# Patient Record
Sex: MALE | Race: WHITE | NOT HISPANIC OR LATINO | Employment: FULL TIME | URBAN - METROPOLITAN AREA
[De-identification: names, ages, dates, MRNs, and addresses within clinical notes are randomized per-mention and may not be internally consistent; named-entity substitution may affect disease eponyms.]

---

## 2023-04-14 ENCOUNTER — TELEPHONE (OUTPATIENT)
Dept: PRIMARY CARE | Facility: CLINIC | Age: 48
End: 2023-04-14

## 2023-04-14 NOTE — TELEPHONE ENCOUNTER
Pt. States his employer is giving him a difficult time in regards to why he takes percocet.    He is requesting a letter stating why he takes it.

## 2023-04-14 NOTE — LETTER
April 14, 2023         Patient: Bereket Schaefer   YOB: 1975   Date of Visit: 4/14/2023       To Whom It May Concern,    Mr. Schaefer takes percocet to treat and manage his chronic back pain. Please contact my office with any further questions.       Sincerely,         Gurpreet Cruz MD      CC: No Recipients  ______________________________________________________________________________________

## 2023-05-29 NOTE — PROGRESS NOTES
Subjective   Patient ID: Bereket Schaefer is a 47 y.o. male who presents for 3 month recheck on chronic back pain and HTN.    OARRS:  Gurpreet Cruz MD on 5/31/2023  4:59 PM  I have personally reviewed the OARRS report for Bereket Schaefer. I have considered the risks of abuse, dependence, addiction and diversion    Is the patient prescribed a combination of a benzodiazepine and opioid?  No    Last Urine Drug Screen / ordered today: Yes  No results found for this or any previous visit (from the past 76131 hour(s)).  Results are as expected.     Controlled Substance Agreement:  Date of the Last Agreement: 5/31/23  Reviewed Controlled Substance Agreement including but not limited to the benefits, risks, and alternatives to treatment with a Controlled Substance medication(s).    Opioids:  What is the patient's goal of therapy? Pain relief   Is this being achieved with current treatment? yes    I have calculated the patient's Morphine Dose Equivalent (MED):   I have considered referral to Pain Management and/or a specialist, and do not feel it is necessary at this time.    I feel that it is clinically indicated to continue this current medication regimen after consideration of alternative therapies, and other non-opioid treatment.    Opioid Risk Screening:  Family History of Substance Abuse  Alcohol: 0 (5/31/2023  5:00 PM)  Illegal Drugs: 0 (5/31/2023  5:00 PM)  Prescription Drugs: 0 (5/31/2023  5:00 PM)    Personal History of Substance Abuse  Alcohol: 0 (5/31/2023  5:00 PM)  Illegal Drugs: 0 (5/31/2023  5:00 PM)  Prescription Drugs: 0 (5/31/2023  5:00 PM)    Patient Age (16-45)  Age (16-45): 0 (5/31/2023  5:00 PM)    History of Preadolescent Sexual Abuse  History of Preadolescent Sexual Abuse: .0 (5/31/2023  5:00 PM)    Psychological Disease  Attention Deficit Disorder, Obsessive Compulsive Disorder, Bipolar, Schizophrenia: 0 (5/31/2023  5:00 PM)  Depression: 0 (5/31/2023  5:00 PM)    Total Score  Total Score: 0 (5/31/2023   5:00 PM)    Total Score Risk Category  TOTAL SCORE CATEGORY: Low Risk (0-3) (5/31/2023  5:00 PM)        Pain Assessment:  Analgesia  What was your pain level on average during the past week?: 7  What was your pain level at its worst during the past week?: 10 - Pain as bad as it can be  What percentage of your pain has been relieved during the past week?: 50 %  Is the amount of pain relief you are now obtaining from your current pain relievers enough to make a real difference in your life?: Y  Query to Clinician: Is the patient's pain relief clinically significant?: Yes    Activities of Daily Living  Physical Functioning: Better  Family Relationships: Same  Social Relationships: Same  Mood: Same  Sleep Patterns: Same  Overall Functioning: Better    Adverse Events  Is patient experiencing any side effects from current pain relievers?: N  Patient's Overall Severity of Side Effects: None      Assessment  Is your overall impression that this patient is benefiting from opioid therapy?: Yes  Specific Analgesic Plan: Continue present regimen      Objective   Visit Vitals  /81 (BP Location: Left arm, Patient Position: Sitting, BP Cuff Size: Large adult)   Pulse 63   Temp 37 °C (98.6 °F) (Temporal)   Resp 16   SpO2 94%   Smoking Status Every Day      O: VSS AFEB Awake, Alert, NAD.  No intoxication, withdrawal, or sedation.  Chest CTA.  Heart RRR.  Ext no c/c/e.   Lumbar ttp.  + B/l slr.    Lab Results   Component Value Date    WBC 7.0 02/23/2022    HGB 15.7 02/23/2022    HCT 47.3 02/23/2022    MCV 93 02/23/2022     02/23/2022       Chemistry    Lab Results   Component Value Date/Time     02/23/2022 1652    K 4.1 02/23/2022 1652     02/23/2022 1652    CO2 30 02/23/2022 1652    BUN 9 02/23/2022 1652    CREATININE 0.83 02/23/2022 1652    Lab Results   Component Value Date/Time    CALCIUM 9.4 02/23/2022 1652    ALKPHOS 87 02/23/2022 1652    AST 17 02/23/2022 1652    ALT 20 02/23/2022 1652    BILITOT 0.7  02/23/2022 1652          Lab Results   Component Value Date    CHOL 229 (H) 11/08/2018     Lab Results   Component Value Date    HDL 27.8 (A) 11/08/2018     No results found for: LDLCALC  Lab Results   Component Value Date    TRIG 184 (H) 11/08/2018     No components found for: CHOLHDL   No results found for: HGBA1C    Assessment/Plan   Problem List Items Addressed This Visit    None       # Right sciatica -- prednisone burst and taper did not help much 2/2022. Continues to get right leg spasms with any extended walking. Normal pulses. C/W neuroclaudication.     # Atypical Moles Mid back -- mulltiple erythematous seb k with irregular pigmentation, and 1 irregular pigmented macule. Diffuse benign appearing seb ks. Refer to [facility] derm.    # GERD vs dysphagia -- recommend trial of famotidine/pepcid 20 mg daily, also encouraged to drink flat (non -carbonated) liquids with meals.     # High Cholesterol-- 13.3% cardiac risk. counseled on smoking cessation -- atorvastatin 20 mg daily.     # Left foot plantar wart -- counseled on using 40% salicylic acid paste to apply every night after paring down dead skin.     # Hypertension -- much improved control -- continue amlodipine to 5 mg daily and lisinopril 10 mg daily -- doing well since switched jobs. Lately using more hydralazine 10 mg tid prn SBP >130. Renal us/bloodwork ok. 24 hour metanephrines normal. Hold off on b-blockers due to pulse in the 60s. EKG was ok. encourage smoking cessation, sodium restriction.     # hx Migraine Headache -- resolved since bp under control. rare use of cyclobenzaprine and regular stretching exercises. Has never tried triptans.    # hx Erectile dysfunction -- improved with Depo-T. no longer takeing.     # Fatigue -- low testosterone -- at 100 summer 2014. Could not start Androgel 2 pumps a day due to cost. Hold off on testosterone injections until bp under control. Now that BP under control --using depo-T 200 mcg monthly helped at first but  now forgetting to have injections done. Check labs now that he has insurance.     # Knee pain -- B/L OA +/- meniscal injury -- continue to treat with ice, rest, can try naproxen instead of ibprofen, knee brace. Counseled on knee strengthening HEP on 3/13/2020. If no better can offer CSI or refer to ortho. puts up with pain.     # hx Right hand paresthesias -- Tinel's over right ulnar canal -- recommended wrist splint.     # hx Episodic Esophageal spasm/odynophagia -- cigarette smoker and heavy coke drinker. no better with trial of otc pepcid. has req to check esophagram and send to Dr Romo for EGD since no better.     # hx Atypical chest pain -- stress test and echo normal 11/2018.    # Angular cheilitis -- improved with topical treatment.     Using ibuprofen, topical creams to get through the day, just sitting on couch when gets home. Left knee swells, wearing knee brace. Uses cyclobenzaprine on weekends but too sedating to use during work week. no better with lidocaine patches.     Stable chronic htn, ed, low t, & chronic back pain, fatigue and ed. Not tolerating gabapentin due to fatigue and weaned off. Weaned off hydrocodone since it wasn't helping, had been using Kratom otc. 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     Meloxicam and celebrex no help. No upset stomach.     has not had testosterone injection in a few months.     Used to drink energy drinks but has not had any since 12/18/15.    3/8/16 ENDO ([facility]) in [place]. No imaging recommended. Reordered 24 hour cortisol collection--> NO ADRENAL CAUSE of HYPERTENSION. MP  2/13/17 PAIN -- Ko -- chronic pain syndrome, failed back surgical syndrome, check XR, MRI. cut back on regular pop (12 cans cocacola daily).   4/18/2017, 3/28/17 B/L L3/4/5 medial nerve blocks & epidural injection ineffective -- made things worse x 2 weeks.  11/7-11/8/18 [facility] -- atypical chest pain -- trop neg. Nuclear Stress Normal, ef 54%, cont ACEi and  CCB, allergic to ASA, encouraged smoking cessation. CXR infiltrate vs atelectasis.

## 2023-05-31 ENCOUNTER — OFFICE VISIT (OUTPATIENT)
Dept: PRIMARY CARE | Facility: CLINIC | Age: 48
End: 2023-05-31

## 2023-05-31 VITALS
DIASTOLIC BLOOD PRESSURE: 81 MMHG | OXYGEN SATURATION: 94 % | HEART RATE: 63 BPM | SYSTOLIC BLOOD PRESSURE: 138 MMHG | TEMPERATURE: 98.6 F | RESPIRATION RATE: 16 BRPM

## 2023-05-31 DIAGNOSIS — M54.41 CHRONIC MIDLINE LOW BACK PAIN WITH BILATERAL SCIATICA: ICD-10-CM

## 2023-05-31 DIAGNOSIS — K13.0 ANGULAR CHEILITIS DUE TO BACTERIAL INFECTION: ICD-10-CM

## 2023-05-31 DIAGNOSIS — A49.9 ANGULAR CHEILITIS DUE TO BACTERIAL INFECTION: ICD-10-CM

## 2023-05-31 DIAGNOSIS — F41.9 ANXIETY: ICD-10-CM

## 2023-05-31 DIAGNOSIS — G89.29 CHRONIC MIDLINE LOW BACK PAIN WITH BILATERAL SCIATICA: ICD-10-CM

## 2023-05-31 DIAGNOSIS — M54.42 CHRONIC MIDLINE LOW BACK PAIN WITH BILATERAL SCIATICA: ICD-10-CM

## 2023-05-31 DIAGNOSIS — N52.9 MALE ERECTILE DISORDER OF ORGANIC ORIGIN: ICD-10-CM

## 2023-05-31 DIAGNOSIS — I10 PRIMARY HYPERTENSION: Primary | ICD-10-CM

## 2023-05-31 PROBLEM — M94.0 COSTOCHONDRITIS: Status: ACTIVE | Noted: 2023-05-31

## 2023-05-31 PROBLEM — R79.89 HIGH SERUM CHOLESTEROL SULFATE: Status: ACTIVE | Noted: 2023-05-31

## 2023-05-31 PROBLEM — N40.1 BPH WITH OBSTRUCTION/LOWER URINARY TRACT SYMPTOMS: Status: ACTIVE | Noted: 2023-05-31

## 2023-05-31 PROBLEM — R53.83 FATIGUE: Status: ACTIVE | Noted: 2023-05-31

## 2023-05-31 PROBLEM — R40.0 DAYTIME SOMNOLENCE: Status: ACTIVE | Noted: 2023-05-31

## 2023-05-31 PROBLEM — F17.210 NICOTINE DEPENDENCE, CIGARETTES, UNCOMPLICATED: Status: ACTIVE | Noted: 2023-05-31

## 2023-05-31 PROBLEM — M25.569 KNEE PAIN: Status: ACTIVE | Noted: 2023-05-31

## 2023-05-31 PROBLEM — N13.8 BPH WITH OBSTRUCTION/LOWER URINARY TRACT SYMPTOMS: Status: ACTIVE | Noted: 2023-05-31

## 2023-05-31 PROBLEM — E23.0 HYPOGONADOTROPIC HYPOGONADISM (MULTI): Status: ACTIVE | Noted: 2023-05-31

## 2023-05-31 PROBLEM — M54.9 BACK PAIN: Status: ACTIVE | Noted: 2023-05-31

## 2023-05-31 PROBLEM — E87.6 HYPOKALEMIA: Status: ACTIVE | Noted: 2023-05-31

## 2023-05-31 PROBLEM — G56.21 IMPINGEMENT OF RIGHT ULNAR NERVE: Status: ACTIVE | Noted: 2023-05-31

## 2023-05-31 PROCEDURE — 99212 OFFICE O/P EST SF 10 MIN: CPT | Performed by: FAMILY MEDICINE

## 2023-05-31 PROCEDURE — 3079F DIAST BP 80-89 MM HG: CPT | Performed by: FAMILY MEDICINE

## 2023-05-31 PROCEDURE — 4004F PT TOBACCO SCREEN RCVD TLK: CPT | Performed by: FAMILY MEDICINE

## 2023-05-31 PROCEDURE — 3075F SYST BP GE 130 - 139MM HG: CPT | Performed by: FAMILY MEDICINE

## 2023-05-31 RX ORDER — ESCITALOPRAM OXALATE 10 MG/1
10 TABLET ORAL DAILY
Qty: 90 TABLET | Refills: 3 | Status: SHIPPED | OUTPATIENT
Start: 2023-05-31 | End: 2024-01-30

## 2023-05-31 RX ORDER — MELOXICAM 15 MG/1
15 TABLET ORAL DAILY
COMMUNITY
End: 2024-01-30

## 2023-05-31 RX ORDER — LISINOPRIL 10 MG/1
10 TABLET ORAL DAILY
Qty: 90 TABLET | Refills: 3 | Status: SHIPPED | OUTPATIENT
Start: 2023-05-31 | End: 2024-01-30 | Stop reason: SDUPTHER

## 2023-05-31 RX ORDER — HYDROCODONE BITARTRATE AND ACETAMINOPHEN 5; 325 MG/1; MG/1
1 TABLET ORAL EVERY 6 HOURS PRN
COMMUNITY
End: 2023-05-31 | Stop reason: ALTCHOICE

## 2023-05-31 RX ORDER — HYDROCODONE BITARTRATE AND ACETAMINOPHEN 10; 325 MG/1; MG/1
1 TABLET ORAL EVERY 4 HOURS PRN
Qty: 180 TABLET | Refills: 0 | Status: SHIPPED | OUTPATIENT
Start: 2023-08-02 | End: 2023-08-30 | Stop reason: SDUPTHER

## 2023-05-31 RX ORDER — OXYCODONE AND ACETAMINOPHEN 7.5; 325 MG/1; MG/1
1 TABLET ORAL 3 TIMES DAILY
Qty: 180 TABLET | Refills: 0 | Status: CANCELLED | OUTPATIENT
Start: 2023-05-31

## 2023-05-31 RX ORDER — HYDRALAZINE HYDROCHLORIDE 10 MG/1
10 TABLET, FILM COATED ORAL 2 TIMES DAILY
Qty: 180 TABLET | Refills: 1 | Status: SHIPPED | OUTPATIENT
Start: 2023-05-31

## 2023-05-31 RX ORDER — TADALAFIL 5 MG/1
5 TABLET ORAL DAILY
COMMUNITY
End: 2023-05-31 | Stop reason: SDUPTHER

## 2023-05-31 RX ORDER — ESCITALOPRAM OXALATE 10 MG/1
10 TABLET ORAL DAILY
COMMUNITY
End: 2023-05-31 | Stop reason: SDUPTHER

## 2023-05-31 RX ORDER — HYDRALAZINE HYDROCHLORIDE 10 MG/1
10 TABLET, FILM COATED ORAL 2 TIMES DAILY
COMMUNITY
Start: 2015-12-22 | End: 2023-05-31 | Stop reason: SDUPTHER

## 2023-05-31 RX ORDER — IBUPROFEN 600 MG/1
600 TABLET ORAL EVERY 8 HOURS PRN
COMMUNITY
Start: 2019-05-22 | End: 2023-05-31 | Stop reason: SDUPTHER

## 2023-05-31 RX ORDER — OXYCODONE AND ACETAMINOPHEN 7.5; 325 MG/1; MG/1
1 TABLET ORAL 3 TIMES DAILY
COMMUNITY
End: 2023-10-03 | Stop reason: ALTCHOICE

## 2023-05-31 RX ORDER — CYCLOBENZAPRINE HCL 10 MG
10 TABLET ORAL 2 TIMES DAILY PRN
Qty: 180 TABLET | Refills: 1 | Status: SHIPPED | OUTPATIENT
Start: 2023-05-31

## 2023-05-31 RX ORDER — CYCLOBENZAPRINE HCL 10 MG
10 TABLET ORAL 2 TIMES DAILY PRN
COMMUNITY
Start: 2014-05-01 | End: 2023-05-31 | Stop reason: SDUPTHER

## 2023-05-31 RX ORDER — FLUCONAZOLE 100 MG/1
100 TABLET ORAL DAILY
Qty: 7 TABLET | Refills: 2 | Status: SHIPPED | OUTPATIENT
Start: 2023-05-31 | End: 2023-12-21

## 2023-05-31 RX ORDER — ATORVASTATIN CALCIUM 20 MG/1
20 TABLET, FILM COATED ORAL DAILY
Qty: 90 TABLET | Refills: 3 | Status: SHIPPED | OUTPATIENT
Start: 2023-05-31 | End: 2024-01-30 | Stop reason: SDUPTHER

## 2023-05-31 RX ORDER — IBUPROFEN 600 MG/1
600 TABLET ORAL EVERY 8 HOURS PRN
Qty: 180 TABLET | Refills: 3 | Status: SHIPPED | OUTPATIENT
Start: 2023-05-31

## 2023-05-31 RX ORDER — HYDROCODONE BITARTRATE AND ACETAMINOPHEN 10; 325 MG/1; MG/1
1 TABLET ORAL EVERY 4 HOURS PRN
Qty: 180 TABLET | Refills: 0 | Status: SHIPPED | OUTPATIENT
Start: 2023-06-03 | End: 2023-07-03

## 2023-05-31 RX ORDER — TADALAFIL 5 MG/1
5 TABLET ORAL DAILY
Qty: 10 TABLET | Refills: 3 | Status: SHIPPED | OUTPATIENT
Start: 2023-05-31 | End: 2023-08-30 | Stop reason: SDUPTHER

## 2023-05-31 RX ORDER — NALOXONE HYDROCHLORIDE 4 MG/.1ML
4 SPRAY NASAL AS NEEDED
Qty: 2 EACH | Refills: 0 | Status: SHIPPED | OUTPATIENT
Start: 2023-05-31 | End: 2024-02-16 | Stop reason: ALTCHOICE

## 2023-05-31 RX ORDER — AMLODIPINE BESYLATE 10 MG/1
10 TABLET ORAL DAILY
Qty: 90 TABLET | Refills: 3 | Status: SHIPPED | OUTPATIENT
Start: 2023-05-31

## 2023-05-31 RX ORDER — HYDROCODONE BITARTRATE AND ACETAMINOPHEN 10; 325 MG/1; MG/1
1 TABLET ORAL EVERY 4 HOURS PRN
Qty: 180 TABLET | Refills: 0 | Status: SHIPPED | OUTPATIENT
Start: 2023-07-03 | End: 2023-08-02

## 2023-05-31 RX ORDER — LISINOPRIL 10 MG/1
10 TABLET ORAL DAILY
COMMUNITY
End: 2023-05-31 | Stop reason: SDUPTHER

## 2023-05-31 RX ORDER — ATORVASTATIN CALCIUM 20 MG/1
1 TABLET, FILM COATED ORAL DAILY
COMMUNITY
Start: 2023-02-08 | End: 2023-05-31 | Stop reason: SDUPTHER

## 2023-05-31 RX ORDER — AMLODIPINE BESYLATE 10 MG/1
10 TABLET ORAL
COMMUNITY
End: 2023-05-31 | Stop reason: SDUPTHER

## 2023-05-31 ASSESSMENT — LIFESTYLE VARIABLES: TOTAL SCORE: 0

## 2023-08-30 ENCOUNTER — OFFICE VISIT (OUTPATIENT)
Dept: PRIMARY CARE | Facility: CLINIC | Age: 48
End: 2023-08-30

## 2023-08-30 VITALS
WEIGHT: 184.6 LBS | OXYGEN SATURATION: 96 % | SYSTOLIC BLOOD PRESSURE: 130 MMHG | BODY MASS INDEX: 25.75 KG/M2 | DIASTOLIC BLOOD PRESSURE: 72 MMHG | HEART RATE: 57 BPM | RESPIRATION RATE: 16 BRPM | TEMPERATURE: 98 F

## 2023-08-30 DIAGNOSIS — N52.9 MALE ERECTILE DISORDER OF ORGANIC ORIGIN: ICD-10-CM

## 2023-08-30 DIAGNOSIS — G89.29 CHRONIC MIDLINE LOW BACK PAIN WITH RIGHT-SIDED SCIATICA: ICD-10-CM

## 2023-08-30 DIAGNOSIS — M54.41 CHRONIC MIDLINE LOW BACK PAIN WITH BILATERAL SCIATICA: ICD-10-CM

## 2023-08-30 DIAGNOSIS — G89.29 CHRONIC MIDLINE LOW BACK PAIN WITH BILATERAL SCIATICA: ICD-10-CM

## 2023-08-30 DIAGNOSIS — M54.42 CHRONIC MIDLINE LOW BACK PAIN WITH BILATERAL SCIATICA: ICD-10-CM

## 2023-08-30 DIAGNOSIS — I10 PRIMARY HYPERTENSION: Primary | ICD-10-CM

## 2023-08-30 DIAGNOSIS — M54.41 CHRONIC MIDLINE LOW BACK PAIN WITH RIGHT-SIDED SCIATICA: ICD-10-CM

## 2023-08-30 PROBLEM — R51.9 HEADACHE: Status: ACTIVE | Noted: 2023-08-30

## 2023-08-30 PROBLEM — K21.9 GERD (GASTROESOPHAGEAL REFLUX DISEASE): Status: ACTIVE | Noted: 2023-08-30

## 2023-08-30 PROCEDURE — 3075F SYST BP GE 130 - 139MM HG: CPT | Performed by: FAMILY MEDICINE

## 2023-08-30 PROCEDURE — 4004F PT TOBACCO SCREEN RCVD TLK: CPT | Performed by: FAMILY MEDICINE

## 2023-08-30 PROCEDURE — 99212 OFFICE O/P EST SF 10 MIN: CPT | Performed by: FAMILY MEDICINE

## 2023-08-30 PROCEDURE — 3078F DIAST BP <80 MM HG: CPT | Performed by: FAMILY MEDICINE

## 2023-08-30 RX ORDER — HYDROCODONE BITARTRATE AND ACETAMINOPHEN 10; 325 MG/1; MG/1
1 TABLET ORAL EVERY 4 HOURS PRN
Qty: 180 TABLET | Refills: 0 | Status: SHIPPED | OUTPATIENT
Start: 2023-09-01 | End: 2023-10-03 | Stop reason: ALTCHOICE

## 2023-08-30 RX ORDER — IBUPROFEN 200 MG
1 TABLET ORAL EVERY 24 HOURS
COMMUNITY
Start: 2022-11-23

## 2023-08-30 RX ORDER — NAPROXEN 500 MG/1
500 TABLET ORAL
COMMUNITY
Start: 2023-08-24 | End: 2023-11-27

## 2023-08-30 RX ORDER — HYDROCODONE BITARTRATE AND ACETAMINOPHEN 10; 325 MG/1; MG/1
1 TABLET ORAL EVERY 4 HOURS PRN
Qty: 180 TABLET | Refills: 0 | Status: SHIPPED | OUTPATIENT
Start: 2023-10-01 | End: 2023-10-31

## 2023-08-30 RX ORDER — TADALAFIL 5 MG/1
5 TABLET ORAL DAILY
Qty: 30 TABLET | Refills: 3 | Status: SHIPPED | OUTPATIENT
Start: 2023-08-30 | End: 2023-12-27

## 2023-08-30 RX ORDER — HYDROCODONE BITARTRATE AND ACETAMINOPHEN 10; 325 MG/1; MG/1
1 TABLET ORAL EVERY 4 HOURS PRN
Qty: 180 TABLET | Refills: 0 | Status: SHIPPED | OUTPATIENT
Start: 2023-10-31 | End: 2023-11-29 | Stop reason: SDUPTHER

## 2023-08-30 NOTE — ASSESSMENT & PLAN NOTE
Tox screen 6/19/2020 urine dip 6/11/2021, 5/25/2022, 8/24/2022, 5/31/23  OARRS reviewed  5/31/23, 8/30/23  Opioid agreement signed 5/31/23
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
## 2023-10-03 ENCOUNTER — TELEPHONE (OUTPATIENT)
Dept: PRIMARY CARE | Facility: CLINIC | Age: 48
End: 2023-10-03

## 2023-10-03 DIAGNOSIS — M54.41 CHRONIC MIDLINE LOW BACK PAIN WITH RIGHT-SIDED SCIATICA: ICD-10-CM

## 2023-10-03 DIAGNOSIS — G89.29 CHRONIC MIDLINE LOW BACK PAIN WITH RIGHT-SIDED SCIATICA: ICD-10-CM

## 2023-10-03 DIAGNOSIS — M54.41 CHRONIC MIDLINE LOW BACK PAIN WITH RIGHT-SIDED SCIATICA: Primary | ICD-10-CM

## 2023-10-03 DIAGNOSIS — G89.29 CHRONIC MIDLINE LOW BACK PAIN WITH RIGHT-SIDED SCIATICA: Primary | ICD-10-CM

## 2023-10-03 RX ORDER — OXYCODONE AND ACETAMINOPHEN 7.5; 325 MG/1; MG/1
1 TABLET ORAL EVERY 4 HOURS PRN
Qty: 150 TABLET | Refills: 0 | Status: SHIPPED | OUTPATIENT
Start: 2023-10-03 | End: 2023-11-02

## 2023-10-03 RX ORDER — OXYCODONE AND ACETAMINOPHEN 7.5; 325 MG/1; MG/1
1 TABLET ORAL EVERY 4 HOURS PRN
Qty: 150 TABLET | Refills: 0 | Status: SHIPPED | OUTPATIENT
Start: 2023-10-31 | End: 2023-11-01 | Stop reason: SDUPTHER

## 2023-10-03 NOTE — Clinical Note
Call Bereket -- let him know that I have changed his rx from hydrocodone 6/day to oxycodone max 5/day for his next fill on 10/31/23. At [facility] in [place]. MP

## 2023-10-03 NOTE — PROGRESS NOTES
Change from norco 10/325 6/daqy to percocet 7.5/325 5/day MED 60->56.25 due to hydrocodone shortage.  I have personally reviewed the OARRS report for this patient.  I have considered the risks of abuse, dependence, addiction, and diversion.  I believe that it is clinically appropriate for this patient to be prescribed this medication based on the documented diagnosis.  Gurpreet Cruz MD

## 2023-11-01 DIAGNOSIS — G89.29 CHRONIC MIDLINE LOW BACK PAIN WITH RIGHT-SIDED SCIATICA: ICD-10-CM

## 2023-11-01 DIAGNOSIS — M54.41 CHRONIC MIDLINE LOW BACK PAIN WITH RIGHT-SIDED SCIATICA: ICD-10-CM

## 2023-11-02 RX ORDER — OXYCODONE AND ACETAMINOPHEN 7.5; 325 MG/1; MG/1
1 TABLET ORAL EVERY 4 HOURS PRN
Qty: 150 TABLET | Refills: 0 | Status: SHIPPED | OUTPATIENT
Start: 2023-11-02 | End: 2023-11-15 | Stop reason: SDUPTHER

## 2023-11-15 ENCOUNTER — OFFICE VISIT (OUTPATIENT)
Dept: PRIMARY CARE | Facility: CLINIC | Age: 48
End: 2023-11-15

## 2023-11-15 VITALS
WEIGHT: 179.2 LBS | BODY MASS INDEX: 24.99 KG/M2 | DIASTOLIC BLOOD PRESSURE: 70 MMHG | OXYGEN SATURATION: 97 % | TEMPERATURE: 97.9 F | SYSTOLIC BLOOD PRESSURE: 140 MMHG | RESPIRATION RATE: 16 BRPM | HEART RATE: 53 BPM

## 2023-11-15 DIAGNOSIS — I10 PRIMARY HYPERTENSION: ICD-10-CM

## 2023-11-15 DIAGNOSIS — G89.29 CHRONIC MIDLINE LOW BACK PAIN WITH RIGHT-SIDED SCIATICA: Primary | ICD-10-CM

## 2023-11-15 DIAGNOSIS — M54.41 CHRONIC MIDLINE LOW BACK PAIN WITH RIGHT-SIDED SCIATICA: Primary | ICD-10-CM

## 2023-11-15 PROCEDURE — 4004F PT TOBACCO SCREEN RCVD TLK: CPT | Performed by: FAMILY MEDICINE

## 2023-11-15 PROCEDURE — 3078F DIAST BP <80 MM HG: CPT | Performed by: FAMILY MEDICINE

## 2023-11-15 PROCEDURE — 99212 OFFICE O/P EST SF 10 MIN: CPT | Performed by: FAMILY MEDICINE

## 2023-11-15 PROCEDURE — 3077F SYST BP >= 140 MM HG: CPT | Performed by: FAMILY MEDICINE

## 2023-11-15 RX ORDER — OXYCODONE AND ACETAMINOPHEN 7.5; 325 MG/1; MG/1
1 TABLET ORAL EVERY 4 HOURS PRN
Qty: 180 TABLET | Refills: 0 | Status: SHIPPED | OUTPATIENT
Start: 2024-01-28 | End: 2023-11-29 | Stop reason: SDUPTHER

## 2023-11-15 RX ORDER — OXYCODONE AND ACETAMINOPHEN 7.5; 325 MG/1; MG/1
1 TABLET ORAL EVERY 4 HOURS PRN
Qty: 150 TABLET | Refills: 0 | Status: SHIPPED | OUTPATIENT
Start: 2023-12-31 | End: 2023-11-15

## 2023-11-15 RX ORDER — OXYCODONE AND ACETAMINOPHEN 7.5; 325 MG/1; MG/1
1 TABLET ORAL EVERY 4 HOURS PRN
Qty: 150 TABLET | Refills: 0 | Status: SHIPPED | OUTPATIENT
Start: 2023-12-01 | End: 2023-11-15

## 2023-11-15 RX ORDER — OXYCODONE AND ACETAMINOPHEN 7.5; 325 MG/1; MG/1
1 TABLET ORAL EVERY 4 HOURS PRN
Qty: 150 TABLET | Refills: 0 | Status: SHIPPED | OUTPATIENT
Start: 2024-01-30 | End: 2023-11-15

## 2023-11-15 RX ORDER — OXYCODONE AND ACETAMINOPHEN 7.5; 325 MG/1; MG/1
1 TABLET ORAL EVERY 4 HOURS PRN
Qty: 180 TABLET | Refills: 0 | Status: SHIPPED | OUTPATIENT
Start: 2023-11-29 | End: 2023-11-29 | Stop reason: SDUPTHER

## 2023-11-15 RX ORDER — OXYCODONE AND ACETAMINOPHEN 7.5; 325 MG/1; MG/1
1 TABLET ORAL EVERY 4 HOURS PRN
Qty: 180 TABLET | Refills: 0 | Status: SHIPPED | OUTPATIENT
Start: 2023-12-29 | End: 2023-11-29 | Stop reason: SDUPTHER

## 2023-11-15 NOTE — ASSESSMENT & PLAN NOTE
Norco 10 changed to oxy 7.5 max 5/day MED 56.25 due to shortage of hydrocodone. --> increase to 6/day = 67.5 MED.     Tox screen 6/19/2020 urine dip 6/11/2021, 5/25/2022, 8/24/2022, 5/31/23  OARRS reviewed  5/31/23, 8/30/23, 11/15/23 (last fill 11/2/23)  Opioid agreement signed 5/31/23
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
## 2023-11-25 DIAGNOSIS — M54.41 CHRONIC MIDLINE LOW BACK PAIN WITH RIGHT-SIDED SCIATICA: ICD-10-CM

## 2023-11-25 DIAGNOSIS — G56.21 IMPINGEMENT OF RIGHT ULNAR NERVE: ICD-10-CM

## 2023-11-25 DIAGNOSIS — G89.29 CHRONIC MIDLINE LOW BACK PAIN WITH RIGHT-SIDED SCIATICA: ICD-10-CM

## 2023-11-27 RX ORDER — NAPROXEN 500 MG/1
500 TABLET ORAL
Qty: 60 TABLET | Refills: 0 | Status: SHIPPED | OUTPATIENT
Start: 2023-11-27 | End: 2024-01-04 | Stop reason: SDUPTHER

## 2023-11-29 DIAGNOSIS — M54.42 CHRONIC MIDLINE LOW BACK PAIN WITH BILATERAL SCIATICA: ICD-10-CM

## 2023-11-29 DIAGNOSIS — G89.29 CHRONIC MIDLINE LOW BACK PAIN WITH BILATERAL SCIATICA: ICD-10-CM

## 2023-11-29 DIAGNOSIS — M54.41 CHRONIC MIDLINE LOW BACK PAIN WITH BILATERAL SCIATICA: ICD-10-CM

## 2023-11-29 RX ORDER — HYDROCODONE BITARTRATE AND ACETAMINOPHEN 10; 325 MG/1; MG/1
1 TABLET ORAL EVERY 4 HOURS PRN
Qty: 180 TABLET | Refills: 0 | Status: SHIPPED | OUTPATIENT
Start: 2023-12-29 | End: 2024-01-28

## 2023-11-29 RX ORDER — HYDROCODONE BITARTRATE AND ACETAMINOPHEN 10; 325 MG/1; MG/1
1 TABLET ORAL EVERY 4 HOURS PRN
Qty: 180 TABLET | Refills: 0 | Status: SHIPPED | OUTPATIENT
Start: 2024-01-28 | End: 2024-02-16 | Stop reason: ALTCHOICE

## 2023-11-29 RX ORDER — HYDROCODONE BITARTRATE AND ACETAMINOPHEN 10; 325 MG/1; MG/1
1 TABLET ORAL EVERY 4 HOURS PRN
Qty: 180 TABLET | Refills: 0 | Status: SHIPPED | OUTPATIENT
Start: 2023-11-29 | End: 2023-12-29

## 2023-11-29 NOTE — ASSESSMENT & PLAN NOTE
Norco 10 max 6/day   Rx 11/29, 12/29, 1/28/24    Tox screen 6/19/2020 urine dip 6/11/2021, 5/25/2022, 8/24/2022, 5/31/23  OARRS reviewed  11/29/23  Opioid agreement signed 5/31/23
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
## 2023-12-20 DIAGNOSIS — A49.9 ANGULAR CHEILITIS DUE TO BACTERIAL INFECTION: ICD-10-CM

## 2023-12-20 DIAGNOSIS — K13.0 ANGULAR CHEILITIS DUE TO BACTERIAL INFECTION: ICD-10-CM

## 2023-12-21 RX ORDER — FLUCONAZOLE 100 MG/1
100 TABLET ORAL DAILY
Qty: 7 TABLET | Refills: 0 | Status: SHIPPED | OUTPATIENT
Start: 2023-12-21 | End: 2023-12-28

## 2023-12-27 DIAGNOSIS — N52.9 MALE ERECTILE DISORDER OF ORGANIC ORIGIN: ICD-10-CM

## 2023-12-27 RX ORDER — TADALAFIL 5 MG/1
5 TABLET ORAL DAILY
Qty: 30 TABLET | Refills: 11 | Status: SHIPPED | OUTPATIENT
Start: 2023-12-27 | End: 2024-02-16 | Stop reason: SDUPTHER

## 2024-01-04 DIAGNOSIS — G89.29 CHRONIC MIDLINE LOW BACK PAIN WITH RIGHT-SIDED SCIATICA: ICD-10-CM

## 2024-01-04 DIAGNOSIS — M54.41 CHRONIC MIDLINE LOW BACK PAIN WITH RIGHT-SIDED SCIATICA: ICD-10-CM

## 2024-01-04 DIAGNOSIS — G56.21 IMPINGEMENT OF RIGHT ULNAR NERVE: ICD-10-CM

## 2024-01-04 RX ORDER — NAPROXEN 500 MG/1
500 TABLET ORAL
Qty: 60 TABLET | Refills: 2 | Status: SHIPPED | OUTPATIENT
Start: 2024-01-04 | End: 2024-03-31

## 2024-01-30 ENCOUNTER — OFFICE VISIT (OUTPATIENT)
Dept: PRIMARY CARE | Facility: CLINIC | Age: 49
End: 2024-01-30

## 2024-01-30 VITALS
WEIGHT: 183.7 LBS | TEMPERATURE: 97.9 F | RESPIRATION RATE: 16 BRPM | SYSTOLIC BLOOD PRESSURE: 130 MMHG | BODY MASS INDEX: 25.62 KG/M2 | HEART RATE: 79 BPM | OXYGEN SATURATION: 97 % | DIASTOLIC BLOOD PRESSURE: 80 MMHG

## 2024-01-30 DIAGNOSIS — M54.41 CHRONIC MIDLINE LOW BACK PAIN WITH RIGHT-SIDED SCIATICA: Primary | ICD-10-CM

## 2024-01-30 DIAGNOSIS — I10 PRIMARY HYPERTENSION: ICD-10-CM

## 2024-01-30 DIAGNOSIS — G89.29 CHRONIC MIDLINE LOW BACK PAIN WITH RIGHT-SIDED SCIATICA: Primary | ICD-10-CM

## 2024-01-30 PROCEDURE — 99212 OFFICE O/P EST SF 10 MIN: CPT | Performed by: FAMILY MEDICINE

## 2024-01-30 PROCEDURE — 3079F DIAST BP 80-89 MM HG: CPT | Performed by: FAMILY MEDICINE

## 2024-01-30 PROCEDURE — 3075F SYST BP GE 130 - 139MM HG: CPT | Performed by: FAMILY MEDICINE

## 2024-01-30 RX ORDER — DULOXETIN HYDROCHLORIDE 30 MG/1
CAPSULE, DELAYED RELEASE ORAL
Qty: 53 CAPSULE | Refills: 0 | Status: SHIPPED | OUTPATIENT
Start: 2024-01-30 | End: 2024-02-14 | Stop reason: SDUPTHER

## 2024-01-30 RX ORDER — PROMETHAZINE HYDROCHLORIDE 25 MG/1
25 TABLET ORAL EVERY 6 HOURS PRN
Qty: 30 TABLET | Refills: 0 | Status: SHIPPED | OUTPATIENT
Start: 2024-01-30 | End: 2024-02-06

## 2024-01-30 RX ORDER — ATORVASTATIN CALCIUM 20 MG/1
20 TABLET, FILM COATED ORAL DAILY
Qty: 30 TABLET | Refills: 11 | Status: SHIPPED | OUTPATIENT
Start: 2024-01-30 | End: 2025-01-29

## 2024-01-30 RX ORDER — CLONIDINE HYDROCHLORIDE 0.1 MG/1
TABLET ORAL
Qty: 120 TABLET | Refills: 0 | Status: SHIPPED | OUTPATIENT
Start: 2024-01-30 | End: 2024-02-14 | Stop reason: ALTCHOICE

## 2024-01-30 RX ORDER — LISINOPRIL 10 MG/1
10 TABLET ORAL DAILY
Qty: 30 TABLET | Refills: 11 | Status: SHIPPED | OUTPATIENT
Start: 2024-01-30 | End: 2025-01-29

## 2024-01-30 RX ORDER — FLUCONAZOLE 100 MG/1
100 TABLET ORAL ONCE
Qty: 1 TABLET | Refills: 1 | Status: SHIPPED | OUTPATIENT
Start: 2024-01-30 | End: 2024-01-30

## 2024-01-30 NOTE — ASSESSMENT & PLAN NOTE
Norco 10 max 6/day   Rx 11/29, 12/29, 1/28/24    Tox screen 6/19/2020 urine dip 6/11/2021, 5/25/2022, 8/24/2022, 5/31/23  [place] reviewed  1/30/24 (last fill 12/29/23, but patient claims filled 1/29/24)  Opioid agreement signed 5/31/23    Plan wean off pain meds with   No sign of active addiction -- just dependence from chronic use.
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
## 2024-01-30 NOTE — PATIENT INSTRUCTIONS
1) Start cymbalta 30 mg once a day x 1 week then 60 mg (2 caps) daily.   2) Phenergan as needed for nausea  3) Start clonidine 0.1 mg twice a day and can have an extra 2 per day as needed (max 4/day)  If blood pressure gets too low-- HOLD AMLODIPINE but continue clonidine.   4) Start cutting back on hydrocodone by 1 tablet (10 mg) every 3rd day  Jan -[id number] 5/day  Feb 2-3-4 4/day  Feb 5-6-7 3/day  Feb 8-9-10 2/day   Feb 11-12-13 1/day  Keep telehealth appt 2/14/24.

## 2024-02-08 ENCOUNTER — TELEPHONE (OUTPATIENT)
Dept: PRIMARY CARE | Facility: CLINIC | Age: 49
End: 2024-02-08

## 2024-02-08 NOTE — TELEPHONE ENCOUNTER
Wife Nkechi called-patient's bp dropped down to 90's/50's and pulse is going up and down. Stated he took 4 Clonidine yesterday-told her too much Clonidine will drop his blood pressure-cut back to 2 per day if possible. Keep him upright, laying down will lower bp more, fluids, food and take him to the ER if drops any lower. Per Dr. Cruz-ok for him to keep that course and he can cut his Lisinopril in half until bp levels out again., Called patient again to inform. Stated they understood.

## 2024-02-14 ENCOUNTER — TELEMEDICINE (OUTPATIENT)
Dept: PRIMARY CARE | Facility: CLINIC | Age: 49
End: 2024-02-14

## 2024-02-14 DIAGNOSIS — I10 PRIMARY HYPERTENSION: Primary | ICD-10-CM

## 2024-02-14 DIAGNOSIS — G89.29 CHRONIC MIDLINE LOW BACK PAIN WITH RIGHT-SIDED SCIATICA: ICD-10-CM

## 2024-02-14 DIAGNOSIS — M54.41 CHRONIC MIDLINE LOW BACK PAIN WITH RIGHT-SIDED SCIATICA: ICD-10-CM

## 2024-02-14 PROCEDURE — 99212 OFFICE O/P EST SF 10 MIN: CPT | Performed by: FAMILY MEDICINE

## 2024-02-14 RX ORDER — DULOXETIN HYDROCHLORIDE 60 MG/1
60 CAPSULE, DELAYED RELEASE ORAL DAILY
Qty: 30 CAPSULE | Refills: 11 | Status: SHIPPED | OUTPATIENT
Start: 2024-02-14 | End: 2024-02-28 | Stop reason: SINTOL
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
## 2024-02-14 NOTE — ASSESSMENT & PLAN NOTE
OFF NORCO!    Tox screen 6/19/2020 urine dip 6/11/2021, 5/25/2022, 8/24/2022, 5/31/23  OARRS reviewed  1/30/24 (last fill 12/29/23, but patient claims filled 1/29/24)  Opioid agreement signed 5/31/23    Tolerated slow wean off pain meds.  Off clonidine.    No sign of active addiction -- just dependence from chronic use.   Increase cymbalta to 60 mg daily.   
continue amlodipine to 5 mg daily and lisinopril 10 mg daily.. Lately using more hydralazine 10 mg tid prn SBP >130.  
[place]

## 2024-02-16 DIAGNOSIS — G89.29 CHRONIC MIDLINE LOW BACK PAIN WITH RIGHT-SIDED SCIATICA: ICD-10-CM

## 2024-02-16 DIAGNOSIS — N52.9 MALE ERECTILE DISORDER OF ORGANIC ORIGIN: ICD-10-CM

## 2024-02-16 DIAGNOSIS — M54.41 CHRONIC MIDLINE LOW BACK PAIN WITH RIGHT-SIDED SCIATICA: ICD-10-CM

## 2024-02-16 RX ORDER — DULOXETIN HYDROCHLORIDE 30 MG/1
30 CAPSULE, DELAYED RELEASE ORAL DAILY
Qty: 7 CAPSULE | Refills: 0 | Status: SHIPPED | OUTPATIENT
Start: 2024-02-16 | End: 2024-02-28 | Stop reason: SDUPTHER

## 2024-02-16 RX ORDER — TADALAFIL 5 MG/1
5 TABLET ORAL DAILY
Qty: 30 TABLET | Refills: 11 | Status: SHIPPED | OUTPATIENT
Start: 2024-02-16 | End: 2025-02-15

## 2024-02-28 DIAGNOSIS — M54.41 CHRONIC MIDLINE LOW BACK PAIN WITH RIGHT-SIDED SCIATICA: ICD-10-CM

## 2024-02-28 DIAGNOSIS — G89.29 CHRONIC MIDLINE LOW BACK PAIN WITH RIGHT-SIDED SCIATICA: ICD-10-CM

## 2024-02-28 RX ORDER — DULOXETIN HYDROCHLORIDE 30 MG/1
30 CAPSULE, DELAYED RELEASE ORAL DAILY
Qty: 30 CAPSULE | Refills: 11 | Status: SHIPPED | OUTPATIENT
Start: 2024-02-28 | End: 2025-02-22

## 2024-03-29 DIAGNOSIS — G56.21 IMPINGEMENT OF RIGHT ULNAR NERVE: ICD-10-CM

## 2024-03-29 DIAGNOSIS — M54.41 CHRONIC MIDLINE LOW BACK PAIN WITH RIGHT-SIDED SCIATICA: ICD-10-CM

## 2024-03-29 DIAGNOSIS — G89.29 CHRONIC MIDLINE LOW BACK PAIN WITH RIGHT-SIDED SCIATICA: ICD-10-CM

## 2024-03-31 RX ORDER — NAPROXEN 500 MG/1
500 TABLET ORAL
Qty: 60 TABLET | Refills: 11 | Status: SHIPPED | OUTPATIENT
Start: 2024-03-31 | End: 2024-06-07 | Stop reason: SDUPTHER

## 2024-05-08 ENCOUNTER — APPOINTMENT (OUTPATIENT)
Dept: PRIMARY CARE | Facility: CLINIC | Age: 49
End: 2024-05-08

## 2024-06-07 DIAGNOSIS — G56.21 IMPINGEMENT OF RIGHT ULNAR NERVE: ICD-10-CM

## 2024-06-07 DIAGNOSIS — M54.41 CHRONIC MIDLINE LOW BACK PAIN WITH RIGHT-SIDED SCIATICA: ICD-10-CM

## 2024-06-07 DIAGNOSIS — G89.29 CHRONIC MIDLINE LOW BACK PAIN WITH RIGHT-SIDED SCIATICA: ICD-10-CM

## 2024-06-10 RX ORDER — NAPROXEN 500 MG/1
500 TABLET ORAL
Qty: 60 TABLET | Refills: 11 | Status: SHIPPED | OUTPATIENT
Start: 2024-06-10

## 2024-06-11 DIAGNOSIS — M54.41 CHRONIC MIDLINE LOW BACK PAIN WITH BILATERAL SCIATICA: ICD-10-CM

## 2024-06-11 DIAGNOSIS — G89.29 CHRONIC MIDLINE LOW BACK PAIN WITH BILATERAL SCIATICA: ICD-10-CM

## 2024-06-11 DIAGNOSIS — M54.42 CHRONIC MIDLINE LOW BACK PAIN WITH BILATERAL SCIATICA: ICD-10-CM

## 2024-06-11 RX ORDER — IBUPROFEN 600 MG/1
600 TABLET ORAL EVERY 8 HOURS PRN
Qty: 270 TABLET | Refills: 3 | Status: SHIPPED | OUTPATIENT
Start: 2024-06-11

## 2024-07-19 ENCOUNTER — OFFICE VISIT (OUTPATIENT)
Dept: PRIMARY CARE | Facility: CLINIC | Age: 49
End: 2024-07-19

## 2024-07-19 VITALS
OXYGEN SATURATION: 96 % | WEIGHT: 170 LBS | RESPIRATION RATE: 16 BRPM | BODY MASS INDEX: 23.71 KG/M2 | HEART RATE: 93 BPM | DIASTOLIC BLOOD PRESSURE: 80 MMHG | SYSTOLIC BLOOD PRESSURE: 120 MMHG

## 2024-07-19 DIAGNOSIS — M54.41 CHRONIC MIDLINE LOW BACK PAIN WITH RIGHT-SIDED SCIATICA: Primary | ICD-10-CM

## 2024-07-19 DIAGNOSIS — M54.41 CHRONIC MIDLINE LOW BACK PAIN WITH RIGHT-SIDED SCIATICA: ICD-10-CM

## 2024-07-19 DIAGNOSIS — M25.511 ACUTE PAIN OF RIGHT SHOULDER: ICD-10-CM

## 2024-07-19 DIAGNOSIS — F41.9 ANXIETY: ICD-10-CM

## 2024-07-19 DIAGNOSIS — S76.119A STRAIN OF QUADRICEPS, UNSPECIFIED LATERALITY, INITIAL ENCOUNTER: ICD-10-CM

## 2024-07-19 DIAGNOSIS — G89.29 CHRONIC MIDLINE LOW BACK PAIN WITH RIGHT-SIDED SCIATICA: Primary | ICD-10-CM

## 2024-07-19 DIAGNOSIS — G89.29 CHRONIC MIDLINE LOW BACK PAIN WITH RIGHT-SIDED SCIATICA: ICD-10-CM

## 2024-07-19 DIAGNOSIS — I10 PRIMARY HYPERTENSION: Primary | ICD-10-CM

## 2024-07-19 PROCEDURE — 99212 OFFICE O/P EST SF 10 MIN: CPT | Performed by: FAMILY MEDICINE

## 2024-07-19 PROCEDURE — 3074F SYST BP LT 130 MM HG: CPT | Performed by: FAMILY MEDICINE

## 2024-07-19 PROCEDURE — 3079F DIAST BP 80-89 MM HG: CPT | Performed by: FAMILY MEDICINE

## 2024-07-19 RX ORDER — ESCITALOPRAM OXALATE 10 MG/1
10 TABLET ORAL DAILY
Start: 2024-07-19

## 2024-07-19 RX ORDER — BUPRENORPHINE AND NALOXONE 2; .5 MG/1; MG/1
1 FILM, SOLUBLE BUCCAL; SUBLINGUAL DAILY
Qty: 30 FILM | Refills: 0 | Status: SHIPPED | OUTPATIENT
Start: 2024-07-19 | End: 2024-08-18

## 2024-07-19 RX ORDER — BUPRENORPHINE AND NALOXONE 2; .5 MG/1; MG/1
1 FILM, SOLUBLE BUCCAL; SUBLINGUAL DAILY
Qty: 30 FILM | Refills: 0 | Status: SHIPPED | OUTPATIENT
Start: 2024-07-19 | End: 2024-07-19 | Stop reason: SDUPTHER

## 2024-07-19 RX ORDER — BUPRENORPHINE 2 MG/1
2 TABLET SUBLINGUAL DAILY
Qty: 30 TABLET | Refills: 0 | Status: SHIPPED | OUTPATIENT
Start: 2024-07-19 | End: 2024-07-19 | Stop reason: CLARIF

## 2024-07-19 RX ORDER — BACLOFEN 10 MG/1
10 TABLET ORAL 3 TIMES DAILY PRN
Qty: 90 TABLET | Refills: 5 | Status: SHIPPED | OUTPATIENT
Start: 2024-07-19 | End: 2025-07-19

## 2024-07-19 RX ORDER — BUPRENORPHINE 2 MG/1
2 TABLET SUBLINGUAL DAILY
Qty: 30 TABLET | Refills: 0 | OUTPATIENT
Start: 2024-07-19 | End: 2024-08-18

## 2024-07-19 RX ORDER — NALOXONE HYDROCHLORIDE 4 MG/.1ML
1 SPRAY NASAL AS NEEDED
Qty: 2 EACH | Refills: 0 | Status: SHIPPED | OUTPATIENT
Start: 2024-07-19

## 2024-07-19 RX ORDER — BUPRENORPHINE HYDROCHLORIDE AND NALOXONE HYDROCHLORIDE DIHYDRATE 2; .5 MG/1; MG/1
1 TABLET SUBLINGUAL DAILY
Qty: 30 TABLET | Refills: 0 | Status: SHIPPED | OUTPATIENT
Start: 2024-07-19 | End: 2024-07-19 | Stop reason: SDUPTHER

## 2024-07-19 NOTE — LETTER
July 19, 2024     Patient: Bereket Schaefer   YOB: 1975   Date of Visit: 7/19/2024       To Whom It May Concern:    Bereket Schaefer was seen in my clinic on 7/19/2024 at 8:20 am. Please excuse Bereket for his absence from work on this day to make the appointment. May return to work on 7/22/2024.    If you have any questions or concerns, please don't hesitate to call.         Sincerely,         Gurpreet Cruz MD        CC: No Recipients

## 2024-07-19 NOTE — ASSESSMENT & PLAN NOTE
OFF NORCO as of 2/2024.    Tox screen 6/19/2020 urine dip 6/11/2021, 5/25/2022, 8/24/2022, 5/31/23, urine dip 7/19/24  OARRS reviewed  7/19/24 (lst fill norco 1/2024)  Opioid agreement signed 5/31/23, 7/19/24    Tolerated slow wean off pain meds.  Off clonidine.    No sign of active addiction -- just dependence from chronic use.   Did not tolerate cymbalta.  Sticking with lexapro.    Trial of buprenorphine 2 mg sl dialy #30 with recheck appt in 1 month.

## 2024-07-19 NOTE — TELEPHONE ENCOUNTER
[facility] [address] does not have the medication in stock.
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
## 2024-07-29 DIAGNOSIS — B37.0 ORAL YEAST INFECTION: ICD-10-CM

## 2024-07-29 RX ORDER — FLUCONAZOLE 100 MG/1
100 TABLET ORAL DAILY
Qty: 7 TABLET | Refills: 2 | Status: CANCELLED | OUTPATIENT
Start: 2024-07-29 | End: 2024-08-19

## 2024-08-12 DIAGNOSIS — I10 PRIMARY HYPERTENSION: ICD-10-CM

## 2024-08-12 RX ORDER — AMLODIPINE BESYLATE 10 MG/1
10 TABLET ORAL DAILY
Qty: 90 TABLET | Refills: 3 | Status: SHIPPED | OUTPATIENT
Start: 2024-08-12

## 2024-08-14 ENCOUNTER — APPOINTMENT (OUTPATIENT)
Dept: PRIMARY CARE | Facility: CLINIC | Age: 49
End: 2024-08-14

## 2024-08-14 DIAGNOSIS — M54.41 CHRONIC MIDLINE LOW BACK PAIN WITH BILATERAL SCIATICA: ICD-10-CM

## 2024-08-14 DIAGNOSIS — S76.119A STRAIN OF QUADRICEPS, UNSPECIFIED LATERALITY, INITIAL ENCOUNTER: ICD-10-CM

## 2024-08-14 DIAGNOSIS — G89.29 CHRONIC MIDLINE LOW BACK PAIN WITH BILATERAL SCIATICA: ICD-10-CM

## 2024-08-14 DIAGNOSIS — M54.42 CHRONIC MIDLINE LOW BACK PAIN WITH BILATERAL SCIATICA: ICD-10-CM

## 2024-08-14 PROCEDURE — 99212 OFFICE O/P EST SF 10 MIN: CPT | Performed by: FAMILY MEDICINE

## 2024-08-14 RX ORDER — BACLOFEN 10 MG/1
10 TABLET ORAL 3 TIMES DAILY PRN
Qty: 90 TABLET | Refills: 11 | Status: SHIPPED | OUTPATIENT
Start: 2024-08-14 | End: 2025-08-14

## 2024-08-14 RX ORDER — HYDROCODONE BITARTRATE AND ACETAMINOPHEN 10; 325 MG/1; MG/1
1 TABLET ORAL EVERY 4 HOURS PRN
Qty: 180 TABLET | Refills: 0 | Status: SHIPPED | OUTPATIENT
Start: 2024-09-13 | End: 2024-10-13

## 2024-08-14 RX ORDER — HYDROCODONE BITARTRATE AND ACETAMINOPHEN 10; 325 MG/1; MG/1
1 TABLET ORAL EVERY 4 HOURS PRN
Qty: 180 TABLET | Refills: 0 | Status: SHIPPED | OUTPATIENT
Start: 2024-10-13 | End: 2024-11-12

## 2024-08-14 RX ORDER — HYDROCODONE BITARTRATE AND ACETAMINOPHEN 10; 325 MG/1; MG/1
1 TABLET ORAL EVERY 4 HOURS PRN
Qty: 180 TABLET | Refills: 0 | Status: SHIPPED | OUTPATIENT
Start: 2024-08-14 | End: 2024-09-13

## 2024-08-14 NOTE — ASSESSMENT & PLAN NOTE
Trialled off NORCO as of 2/2024-7/2024 -- mild withdrawal sx improved with clonidine.  No cravings or aberrant behaviour x 5 months while off but with worsening pain and loss of function at home and work due to pain limiting his function.     Tox screen 6/19/2020 urine dip 6/11/2021, 5/25/2022, 8/24/2022, 5/31/23, urine dip 7/19/24  OARRS reviewed  7/19/24 (last fill norco 1/2024), 8/14/24.   Opioid agreement signed 5/31/23, 7/19/24    Tolerated slow wean off pain meds.  Off clonidine.    No sign of active addiction -- just dependence from chronic use.   Did not tolerate cymbalta.  Sticking with lexapro.    Failed Trial of buprenorphine 2 mg sl dialy #30 for pain -- caused sedation and minimal improvement in pain or function.     Will resume prior strategy of norco 10/325 1 po q4h prn pain.  Max 6/day or 60 MED.
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
## 2024-09-10 ENCOUNTER — HOSPITAL ENCOUNTER (INPATIENT)
Facility: HOSPITAL | Age: 49
End: 2024-09-10
Attending: STUDENT IN AN ORGANIZED HEALTH CARE EDUCATION/TRAINING PROGRAM | Admitting: INTERNAL MEDICINE

## 2024-09-10 ENCOUNTER — APPOINTMENT (OUTPATIENT)
Dept: RADIOLOGY | Facility: HOSPITAL | Age: 49
End: 2024-09-10

## 2024-09-10 DIAGNOSIS — R53.1 GENERALIZED WEAKNESS: ICD-10-CM

## 2024-09-10 DIAGNOSIS — M54.42 CHRONIC MIDLINE LOW BACK PAIN WITH BILATERAL SCIATICA: ICD-10-CM

## 2024-09-10 DIAGNOSIS — I42.9 CARDIOMYOPATHY, UNSPECIFIED: ICD-10-CM

## 2024-09-10 DIAGNOSIS — I10 PRIMARY HYPERTENSION: ICD-10-CM

## 2024-09-10 DIAGNOSIS — M62.82 NON-TRAUMATIC RHABDOMYOLYSIS: ICD-10-CM

## 2024-09-10 DIAGNOSIS — G89.29 CHRONIC MIDLINE LOW BACK PAIN WITH BILATERAL SCIATICA: ICD-10-CM

## 2024-09-10 DIAGNOSIS — E87.6 HYPOKALEMIA: Primary | ICD-10-CM

## 2024-09-10 DIAGNOSIS — M54.41 CHRONIC MIDLINE LOW BACK PAIN WITH BILATERAL SCIATICA: ICD-10-CM

## 2024-09-10 LAB
ABO GROUP (TYPE) IN BLOOD: NORMAL
ABO GROUP (TYPE) IN BLOOD: NORMAL
ALBUMIN SERPL-MCNC: 2.9 G/DL (ref 3.5–5)
ALP BLD-CCNC: 80 U/L (ref 35–125)
ALT SERPL-CCNC: 62 U/L (ref 5–40)
ANION GAP SERPL CALC-SCNC: 11 MMOL/L
ANION GAP SERPL CALC-SCNC: 12 MMOL/L
ANION GAP SERPL CALC-SCNC: 12 MMOL/L
ANION GAP SERPL CALC-SCNC: 13 MMOL/L
ANTIBODY SCREEN: NORMAL
APPEARANCE UR: CLEAR
APPEARANCE UR: CLEAR
AST SERPL-CCNC: 145 U/L (ref 5–40)
BASOPHILS # BLD AUTO: 0.05 X10*3/UL (ref 0–0.1)
BASOPHILS NFR BLD AUTO: 0.5 %
BILIRUB SERPL-MCNC: 0.5 MG/DL (ref 0.1–1.2)
BILIRUB UR STRIP.AUTO-MCNC: NEGATIVE MG/DL
BILIRUB UR STRIP.AUTO-MCNC: NEGATIVE MG/DL
BUN SERPL-MCNC: 10 MG/DL (ref 8–25)
BUN SERPL-MCNC: 10 MG/DL (ref 8–25)
BUN SERPL-MCNC: 11 MG/DL (ref 8–25)
BUN SERPL-MCNC: 9 MG/DL (ref 8–25)
CALCIUM SERPL-MCNC: 7.2 MG/DL (ref 8.5–10.4)
CALCIUM SERPL-MCNC: 7.6 MG/DL (ref 8.5–10.4)
CALCIUM SERPL-MCNC: 8.3 MG/DL (ref 8.5–10.4)
CALCIUM SERPL-MCNC: 8.4 MG/DL (ref 8.5–10.4)
CHLORIDE SERPL-SCNC: 103 MMOL/L (ref 97–107)
CHLORIDE SERPL-SCNC: 105 MMOL/L (ref 97–107)
CHLORIDE SERPL-SCNC: 107 MMOL/L (ref 97–107)
CHLORIDE SERPL-SCNC: 109 MMOL/L (ref 97–107)
CK SERPL-CCNC: 3711 U/L (ref 24–195)
CO2 SERPL-SCNC: 25 MMOL/L (ref 24–31)
CO2 SERPL-SCNC: 26 MMOL/L (ref 24–31)
CO2 SERPL-SCNC: 28 MMOL/L (ref 24–31)
CO2 SERPL-SCNC: 28 MMOL/L (ref 24–31)
COLOR UR: ABNORMAL
COLOR UR: YELLOW
CREAT SERPL-MCNC: 0.8 MG/DL (ref 0.4–1.6)
CREAT SERPL-MCNC: 0.8 MG/DL (ref 0.4–1.6)
CREAT SERPL-MCNC: 0.9 MG/DL (ref 0.4–1.6)
CREAT SERPL-MCNC: 0.9 MG/DL (ref 0.4–1.6)
EGFRCR SERPLBLD CKD-EPI 2021: >90 ML/MIN/1.73M*2
EOSINOPHIL # BLD AUTO: 0.14 X10*3/UL (ref 0–0.7)
EOSINOPHIL NFR BLD AUTO: 1.4 %
ERYTHROCYTE [DISTWIDTH] IN BLOOD BY AUTOMATED COUNT: 14.9 % (ref 11.5–14.5)
EST. AVERAGE GLUCOSE BLD GHB EST-MCNC: 114 MG/DL
GLUCOSE SERPL-MCNC: 102 MG/DL (ref 65–99)
GLUCOSE SERPL-MCNC: 127 MG/DL (ref 65–99)
GLUCOSE SERPL-MCNC: 165 MG/DL (ref 65–99)
GLUCOSE SERPL-MCNC: 94 MG/DL (ref 65–99)
GLUCOSE UR STRIP.AUTO-MCNC: NORMAL MG/DL
GLUCOSE UR STRIP.AUTO-MCNC: NORMAL MG/DL
HBA1C MFR BLD: 5.6 %
HCT VFR BLD AUTO: 41.3 % (ref 41–52)
HGB BLD-MCNC: 15 G/DL (ref 13.5–17.5)
HOLD SPECIMEN: NORMAL
IMM GRANULOCYTES # BLD AUTO: 0.03 X10*3/UL (ref 0–0.7)
IMM GRANULOCYTES NFR BLD AUTO: 0.3 % (ref 0–0.9)
INR PPP: 1.1 (ref 0.9–1.2)
KETONES UR STRIP.AUTO-MCNC: NEGATIVE MG/DL
KETONES UR STRIP.AUTO-MCNC: NEGATIVE MG/DL
LEUKOCYTE ESTERASE UR QL STRIP.AUTO: ABNORMAL
LEUKOCYTE ESTERASE UR QL STRIP.AUTO: NEGATIVE
LYMPHOCYTES # BLD AUTO: 2.43 X10*3/UL (ref 1.2–4.8)
LYMPHOCYTES NFR BLD AUTO: 24.3 %
MAGNESIUM SERPL-MCNC: 1.5 MG/DL (ref 1.6–3.1)
MAGNESIUM SERPL-MCNC: 1.7 MG/DL (ref 1.6–3.1)
MAGNESIUM SERPL-MCNC: 1.8 MG/DL (ref 1.6–3.1)
MCH RBC QN AUTO: 30.2 PG (ref 26–34)
MCHC RBC AUTO-ENTMCNC: 36.3 G/DL (ref 32–36)
MCV RBC AUTO: 83 FL (ref 80–100)
MONOCYTES # BLD AUTO: 0.89 X10*3/UL (ref 0.1–1)
MONOCYTES NFR BLD AUTO: 8.9 %
MUCOUS THREADS #/AREA URNS AUTO: ABNORMAL /LPF
NEUTROPHILS # BLD AUTO: 6.45 X10*3/UL (ref 1.2–7.7)
NEUTROPHILS NFR BLD AUTO: 64.6 %
NITRITE UR QL STRIP.AUTO: NEGATIVE
NITRITE UR QL STRIP.AUTO: NEGATIVE
NRBC BLD-RTO: 0 /100 WBCS (ref 0–0)
NT-PROBNP SERPL-MCNC: NORMAL PG/ML
PH UR STRIP.AUTO: 6.5 [PH]
PH UR STRIP.AUTO: 7 [PH]
PHOSPHATE SERPL-MCNC: 1.5 MG/DL (ref 2.5–4.5)
PHOSPHATE SERPL-MCNC: 1.7 MG/DL (ref 2.5–4.5)
PHOSPHATE SERPL-MCNC: 1.9 MG/DL (ref 2.5–4.5)
PLATELET # BLD AUTO: 365 X10*3/UL (ref 150–450)
POTASSIUM SERPL-SCNC: 1.6 MMOL/L (ref 3.4–5.1)
POTASSIUM SERPL-SCNC: 1.6 MMOL/L (ref 3.4–5.1)
POTASSIUM SERPL-SCNC: <1 MMOL/L (ref 3.4–5.1)
POTASSIUM SERPL-SCNC: <1.5 MMOL/L (ref 3.4–5.1)
PROT SERPL-MCNC: 4.7 G/DL (ref 5.9–7.9)
PROT UR STRIP.AUTO-MCNC: ABNORMAL MG/DL
PROT UR STRIP.AUTO-MCNC: ABNORMAL MG/DL
PROTHROMBIN TIME: 11.2 SECONDS (ref 9.3–12.7)
RBC # BLD AUTO: 4.96 X10*6/UL (ref 4.5–5.9)
RBC # UR STRIP.AUTO: ABNORMAL /UL
RBC # UR STRIP.AUTO: ABNORMAL /UL
RBC #/AREA URNS AUTO: ABNORMAL /HPF
RBC #/AREA URNS AUTO: NORMAL /HPF
RH FACTOR (ANTIGEN D): NORMAL
RH FACTOR (ANTIGEN D): NORMAL
SARS-COV-2 RNA RESP QL NAA+PROBE: NOT DETECTED
SODIUM SERPL-SCNC: 144 MMOL/L (ref 133–145)
SODIUM SERPL-SCNC: 144 MMOL/L (ref 133–145)
SODIUM SERPL-SCNC: 145 MMOL/L (ref 133–145)
SODIUM SERPL-SCNC: 146 MMOL/L (ref 133–145)
SP GR UR STRIP.AUTO: 1.02
SP GR UR STRIP.AUTO: 1.03
SQUAMOUS #/AREA URNS AUTO: ABNORMAL /HPF
TROPONIN T SERPL-MCNC: 75 NG/L
TROPONIN T SERPL-MCNC: 78 NG/L
TROPONIN T SERPL-MCNC: NORMAL
UROBILINOGEN UR STRIP.AUTO-MCNC: NORMAL MG/DL
UROBILINOGEN UR STRIP.AUTO-MCNC: NORMAL MG/DL
WBC # BLD AUTO: 10 X10*3/UL (ref 4.4–11.3)
WBC #/AREA URNS AUTO: ABNORMAL /HPF
WBC #/AREA URNS AUTO: NORMAL /HPF

## 2024-09-10 PROCEDURE — 2020000001 HC ICU ROOM DAILY

## 2024-09-10 PROCEDURE — 85025 COMPLETE CBC W/AUTO DIFF WBC: CPT | Performed by: STUDENT IN AN ORGANIZED HEALTH CARE EDUCATION/TRAINING PROGRAM

## 2024-09-10 PROCEDURE — 84484 ASSAY OF TROPONIN QUANT: CPT | Performed by: INTERNAL MEDICINE

## 2024-09-10 PROCEDURE — 36415 COLL VENOUS BLD VENIPUNCTURE: CPT | Performed by: STUDENT IN AN ORGANIZED HEALTH CARE EDUCATION/TRAINING PROGRAM

## 2024-09-10 PROCEDURE — 81001 URINALYSIS AUTO W/SCOPE: CPT | Performed by: STUDENT IN AN ORGANIZED HEALTH CARE EDUCATION/TRAINING PROGRAM

## 2024-09-10 PROCEDURE — 82550 ASSAY OF CK (CPK): CPT | Performed by: STUDENT IN AN ORGANIZED HEALTH CARE EDUCATION/TRAINING PROGRAM

## 2024-09-10 PROCEDURE — 83735 ASSAY OF MAGNESIUM: CPT | Performed by: INTERNAL MEDICINE

## 2024-09-10 PROCEDURE — 80048 BASIC METABOLIC PNL TOTAL CA: CPT | Mod: CCI | Performed by: STUDENT IN AN ORGANIZED HEALTH CARE EDUCATION/TRAINING PROGRAM

## 2024-09-10 PROCEDURE — 74177 CT ABD & PELVIS W/CONTRAST: CPT | Performed by: RADIOLOGY

## 2024-09-10 PROCEDURE — 83880 ASSAY OF NATRIURETIC PEPTIDE: CPT | Performed by: INTERNAL MEDICINE

## 2024-09-10 PROCEDURE — 81003 URINALYSIS AUTO W/O SCOPE: CPT | Performed by: INTERNAL MEDICINE

## 2024-09-10 PROCEDURE — 70450 CT HEAD/BRAIN W/O DYE: CPT

## 2024-09-10 PROCEDURE — 2500000005 HC RX 250 GENERAL PHARMACY W/O HCPCS: Performed by: STUDENT IN AN ORGANIZED HEALTH CARE EDUCATION/TRAINING PROGRAM

## 2024-09-10 PROCEDURE — S4991 NICOTINE PATCH NONLEGEND: HCPCS | Performed by: INTERNAL MEDICINE

## 2024-09-10 PROCEDURE — 2500000001 HC RX 250 WO HCPCS SELF ADMINISTERED DRUGS (ALT 637 FOR MEDICARE OP): Performed by: STUDENT IN AN ORGANIZED HEALTH CARE EDUCATION/TRAINING PROGRAM

## 2024-09-10 PROCEDURE — 2500000004 HC RX 250 GENERAL PHARMACY W/ HCPCS (ALT 636 FOR OP/ED): Performed by: INTERNAL MEDICINE

## 2024-09-10 PROCEDURE — 85610 PROTHROMBIN TIME: CPT | Performed by: INTERNAL MEDICINE

## 2024-09-10 PROCEDURE — 86901 BLOOD TYPING SEROLOGIC RH(D): CPT | Performed by: INTERNAL MEDICINE

## 2024-09-10 PROCEDURE — 96374 THER/PROPH/DIAG INJ IV PUSH: CPT

## 2024-09-10 PROCEDURE — 83735 ASSAY OF MAGNESIUM: CPT | Performed by: STUDENT IN AN ORGANIZED HEALTH CARE EDUCATION/TRAINING PROGRAM

## 2024-09-10 PROCEDURE — 83036 HEMOGLOBIN GLYCOSYLATED A1C: CPT | Performed by: INTERNAL MEDICINE

## 2024-09-10 PROCEDURE — 2500000001 HC RX 250 WO HCPCS SELF ADMINISTERED DRUGS (ALT 637 FOR MEDICARE OP): Performed by: INTERNAL MEDICINE

## 2024-09-10 PROCEDURE — 82570 ASSAY OF URINE CREATININE: CPT | Performed by: INTERNAL MEDICINE

## 2024-09-10 PROCEDURE — 80048 BASIC METABOLIC PNL TOTAL CA: CPT | Mod: CCI | Performed by: INTERNAL MEDICINE

## 2024-09-10 PROCEDURE — 87635 SARS-COV-2 COVID-19 AMP PRB: CPT | Performed by: STUDENT IN AN ORGANIZED HEALTH CARE EDUCATION/TRAINING PROGRAM

## 2024-09-10 PROCEDURE — 2550000001 HC RX 255 CONTRASTS: Performed by: STUDENT IN AN ORGANIZED HEALTH CARE EDUCATION/TRAINING PROGRAM

## 2024-09-10 PROCEDURE — 99291 CRITICAL CARE FIRST HOUR: CPT | Mod: 25

## 2024-09-10 PROCEDURE — 2500000002 HC RX 250 W HCPCS SELF ADMINISTERED DRUGS (ALT 637 FOR MEDICARE OP, ALT 636 FOR OP/ED): Performed by: STUDENT IN AN ORGANIZED HEALTH CARE EDUCATION/TRAINING PROGRAM

## 2024-09-10 PROCEDURE — 2500000004 HC RX 250 GENERAL PHARMACY W/ HCPCS (ALT 636 FOR OP/ED): Performed by: STUDENT IN AN ORGANIZED HEALTH CARE EDUCATION/TRAINING PROGRAM

## 2024-09-10 PROCEDURE — 84100 ASSAY OF PHOSPHORUS: CPT | Performed by: STUDENT IN AN ORGANIZED HEALTH CARE EDUCATION/TRAINING PROGRAM

## 2024-09-10 PROCEDURE — 70450 CT HEAD/BRAIN W/O DYE: CPT | Performed by: RADIOLOGY

## 2024-09-10 PROCEDURE — 87040 BLOOD CULTURE FOR BACTERIA: CPT | Mod: TRILAB | Performed by: INTERNAL MEDICINE

## 2024-09-10 PROCEDURE — 96361 HYDRATE IV INFUSION ADD-ON: CPT

## 2024-09-10 PROCEDURE — 87086 URINE CULTURE/COLONY COUNT: CPT | Mod: TRILAB,WESLAB | Performed by: STUDENT IN AN ORGANIZED HEALTH CARE EDUCATION/TRAINING PROGRAM

## 2024-09-10 PROCEDURE — 2500000002 HC RX 250 W HCPCS SELF ADMINISTERED DRUGS (ALT 637 FOR MEDICARE OP, ALT 636 FOR OP/ED): Performed by: INTERNAL MEDICINE

## 2024-09-10 PROCEDURE — 74177 CT ABD & PELVIS W/CONTRAST: CPT

## 2024-09-10 PROCEDURE — 84100 ASSAY OF PHOSPHORUS: CPT | Performed by: INTERNAL MEDICINE

## 2024-09-10 RX ORDER — AMLODIPINE BESYLATE 10 MG/1
10 TABLET ORAL DAILY
Status: DISCONTINUED | OUTPATIENT
Start: 2024-09-10 | End: 2024-09-12

## 2024-09-10 RX ORDER — PANTOPRAZOLE SODIUM 40 MG/1
40 TABLET, DELAYED RELEASE ORAL
Status: DISCONTINUED | OUTPATIENT
Start: 2024-09-11 | End: 2024-09-16 | Stop reason: HOSPADM

## 2024-09-10 RX ORDER — POTASSIUM CHLORIDE 20 MEQ/1
40 TABLET, EXTENDED RELEASE ORAL ONCE
Status: COMPLETED | OUTPATIENT
Start: 2024-09-10 | End: 2024-09-10

## 2024-09-10 RX ORDER — PANTOPRAZOLE SODIUM 40 MG/10ML
40 INJECTION, POWDER, LYOPHILIZED, FOR SOLUTION INTRAVENOUS
Status: DISCONTINUED | OUTPATIENT
Start: 2024-09-11 | End: 2024-09-16 | Stop reason: HOSPADM

## 2024-09-10 RX ORDER — BACLOFEN 10 MG/1
10 TABLET ORAL 3 TIMES DAILY PRN
Status: DISCONTINUED | OUTPATIENT
Start: 2024-09-10 | End: 2024-09-11

## 2024-09-10 RX ORDER — POTASSIUM CHLORIDE 14.9 MG/ML
20 INJECTION INTRAVENOUS ONCE
Status: COMPLETED | OUTPATIENT
Start: 2024-09-10 | End: 2024-09-10

## 2024-09-10 RX ORDER — SODIUM CHLORIDE 9 MG/ML
125 INJECTION, SOLUTION INTRAVENOUS CONTINUOUS
Status: DISCONTINUED | OUTPATIENT
Start: 2024-09-10 | End: 2024-09-11

## 2024-09-10 RX ORDER — ACETAMINOPHEN 325 MG/1
650 TABLET ORAL EVERY 4 HOURS PRN
Status: DISCONTINUED | OUTPATIENT
Start: 2024-09-10 | End: 2024-09-16 | Stop reason: HOSPADM

## 2024-09-10 RX ORDER — ACETAMINOPHEN 650 MG/1
650 SUPPOSITORY RECTAL EVERY 4 HOURS PRN
Status: DISCONTINUED | OUTPATIENT
Start: 2024-09-10 | End: 2024-09-16 | Stop reason: HOSPADM

## 2024-09-10 RX ORDER — ESCITALOPRAM OXALATE 10 MG/1
10 TABLET ORAL DAILY
Status: DISCONTINUED | OUTPATIENT
Start: 2024-09-10 | End: 2024-09-16 | Stop reason: HOSPADM

## 2024-09-10 RX ORDER — ACETAMINOPHEN 160 MG/5ML
650 SOLUTION ORAL EVERY 4 HOURS PRN
Status: DISCONTINUED | OUTPATIENT
Start: 2024-09-10 | End: 2024-09-16 | Stop reason: HOSPADM

## 2024-09-10 RX ORDER — OXYCODONE HYDROCHLORIDE 5 MG/1
5 TABLET ORAL EVERY 6 HOURS PRN
Status: DISCONTINUED | OUTPATIENT
Start: 2024-09-10 | End: 2024-09-16 | Stop reason: HOSPADM

## 2024-09-10 RX ORDER — POTASSIUM CHLORIDE 20 MEQ/1
40 TABLET, EXTENDED RELEASE ORAL EVERY 4 HOURS
Status: COMPLETED | OUTPATIENT
Start: 2024-09-10 | End: 2024-09-10

## 2024-09-10 RX ORDER — IBUPROFEN 200 MG
1 TABLET ORAL DAILY
Status: DISCONTINUED | OUTPATIENT
Start: 2024-09-10 | End: 2024-09-16 | Stop reason: HOSPADM

## 2024-09-10 RX ORDER — POTASSIUM CHLORIDE 14.9 MG/ML
20 INJECTION INTRAVENOUS
Status: COMPLETED | OUTPATIENT
Start: 2024-09-10 | End: 2024-09-11

## 2024-09-10 RX ORDER — ENOXAPARIN SODIUM 100 MG/ML
40 INJECTION SUBCUTANEOUS EVERY 24 HOURS
Status: DISCONTINUED | OUTPATIENT
Start: 2024-09-10 | End: 2024-09-16 | Stop reason: HOSPADM

## 2024-09-10 RX ORDER — MAGNESIUM SULFATE 1 G/100ML
1 INJECTION INTRAVENOUS ONCE
Status: COMPLETED | OUTPATIENT
Start: 2024-09-10 | End: 2024-09-10

## 2024-09-10 SDOH — SOCIAL STABILITY: SOCIAL NETWORK: ARE YOU MARRIED, WIDOWED, DIVORCED, SEPARATED, NEVER MARRIED, OR LIVING WITH A PARTNER?: MARRIED

## 2024-09-10 SDOH — ECONOMIC STABILITY: FOOD INSECURITY: WITHIN THE PAST 12 MONTHS, YOU WORRIED THAT YOUR FOOD WOULD RUN OUT BEFORE YOU GOT MONEY TO BUY MORE.: NEVER TRUE

## 2024-09-10 SDOH — HEALTH STABILITY: MENTAL HEALTH
STRESS IS WHEN SOMEONE FEELS TENSE, NERVOUS, ANXIOUS, OR CAN'T SLEEP AT NIGHT BECAUSE THEIR MIND IS TROUBLED. HOW STRESSED ARE YOU?: NOT AT ALL

## 2024-09-10 SDOH — SOCIAL STABILITY: SOCIAL NETWORK: HOW OFTEN DO YOU ATTEND CHURCH OR RELIGIOUS SERVICES?: NEVER

## 2024-09-10 SDOH — ECONOMIC STABILITY: INCOME INSECURITY: IN THE PAST 12 MONTHS, HAS THE ELECTRIC, GAS, OIL, OR WATER COMPANY THREATENED TO SHUT OFF SERVICE IN YOUR HOME?: NO

## 2024-09-10 SDOH — SOCIAL STABILITY: SOCIAL NETWORK
DO YOU BELONG TO ANY CLUBS OR ORGANIZATIONS SUCH AS CHURCH GROUPS UNIONS, FRATERNAL OR ATHLETIC GROUPS, OR SCHOOL GROUPS?: NO

## 2024-09-10 SDOH — SOCIAL STABILITY: SOCIAL INSECURITY: WERE YOU ABLE TO COMPLETE ALL THE BEHAVIORAL HEALTH SCREENINGS?: YES

## 2024-09-10 SDOH — SOCIAL STABILITY: SOCIAL INSECURITY: WITHIN THE LAST YEAR, HAVE YOU BEEN HUMILIATED OR EMOTIONALLY ABUSED IN OTHER WAYS BY YOUR PARTNER OR EX-PARTNER?: NO

## 2024-09-10 SDOH — SOCIAL STABILITY: SOCIAL INSECURITY
WITHIN THE LAST YEAR, HAVE TO BEEN RAPED OR FORCED TO HAVE ANY KIND OF SEXUAL ACTIVITY BY YOUR PARTNER OR EX-PARTNER?: NO

## 2024-09-10 SDOH — SOCIAL STABILITY: SOCIAL NETWORK: HOW OFTEN DO YOU GET TOGETHER WITH FRIENDS OR RELATIVES?: THREE TIMES A WEEK

## 2024-09-10 SDOH — SOCIAL STABILITY: SOCIAL INSECURITY: WITHIN THE LAST YEAR, HAVE YOU BEEN AFRAID OF YOUR PARTNER OR EX-PARTNER?: NO

## 2024-09-10 SDOH — ECONOMIC STABILITY: FOOD INSECURITY: WITHIN THE PAST 12 MONTHS, THE FOOD YOU BOUGHT JUST DIDN'T LAST AND YOU DIDN'T HAVE MONEY TO GET MORE.: NEVER TRUE

## 2024-09-10 SDOH — HEALTH STABILITY: MENTAL HEALTH
HOW OFTEN DO YOU NEED TO HAVE SOMEONE HELP YOU WHEN YOU READ INSTRUCTIONS, PAMPHLETS, OR OTHER WRITTEN MATERIAL FROM YOUR DOCTOR OR PHARMACY?: RARELY

## 2024-09-10 SDOH — SOCIAL STABILITY: SOCIAL INSECURITY
WITHIN THE LAST YEAR, HAVE YOU BEEN KICKED, HIT, SLAPPED, OR OTHERWISE PHYSICALLY HURT BY YOUR PARTNER OR EX-PARTNER?: NO

## 2024-09-10 SDOH — HEALTH STABILITY: PHYSICAL HEALTH: ON AVERAGE, HOW MANY MINUTES DO YOU ENGAGE IN EXERCISE AT THIS LEVEL?: PATIENT UNABLE TO ANSWER

## 2024-09-10 SDOH — SOCIAL STABILITY: SOCIAL INSECURITY: HAVE YOU HAD THOUGHTS OF HARMING ANYONE ELSE?: NO

## 2024-09-10 SDOH — SOCIAL STABILITY: SOCIAL NETWORK: HOW OFTEN DO YOU ATTENT MEETINGS OF THE CLUB OR ORGANIZATION YOU BELONG TO?: NEVER

## 2024-09-10 SDOH — SOCIAL STABILITY: SOCIAL INSECURITY: ABUSE: ADULT

## 2024-09-10 SDOH — SOCIAL STABILITY: SOCIAL NETWORK
IN A TYPICAL WEEK, HOW MANY TIMES DO YOU TALK ON THE PHONE WITH FAMILY, FRIENDS, OR NEIGHBORS?: MORE THAN THREE TIMES A WEEK

## 2024-09-10 SDOH — HEALTH STABILITY: PHYSICAL HEALTH: ON AVERAGE, HOW MANY DAYS PER WEEK DO YOU ENGAGE IN MODERATE TO STRENUOUS EXERCISE (LIKE A BRISK WALK)?: 5 DAYS

## 2024-09-10 ASSESSMENT — PAIN DESCRIPTION - LOCATION
LOCATION: GROIN
LOCATION: BACK

## 2024-09-10 ASSESSMENT — PATIENT HEALTH QUESTIONNAIRE - PHQ9
2. FEELING DOWN, DEPRESSED OR HOPELESS: NOT AT ALL
SUM OF ALL RESPONSES TO PHQ9 QUESTIONS 1 & 2: 0
1. LITTLE INTEREST OR PLEASURE IN DOING THINGS: NOT AT ALL

## 2024-09-10 ASSESSMENT — COGNITIVE AND FUNCTIONAL STATUS - GENERAL
PATIENT BASELINE BEDBOUND: NO
MOBILITY SCORE: 24
DAILY ACTIVITIY SCORE: 24

## 2024-09-10 ASSESSMENT — PAIN SCALES - GENERAL
PAINLEVEL_OUTOF10: 0 - NO PAIN
PAINLEVEL_OUTOF10: 9
PAINLEVEL_OUTOF10: 4
PAINLEVEL_OUTOF10: 6
PAINLEVEL_OUTOF10: 0 - NO PAIN

## 2024-09-10 ASSESSMENT — ENCOUNTER SYMPTOMS
PSYCHIATRIC NEGATIVE: 1
FATIGUE: 1
HEMATOLOGIC/LYMPHATIC NEGATIVE: 1
WEAKNESS: 1
GASTROINTESTINAL NEGATIVE: 1
CARDIOVASCULAR NEGATIVE: 1
ENDOCRINE NEGATIVE: 1
MUSCULOSKELETAL NEGATIVE: 1
EYES NEGATIVE: 1
RESPIRATORY NEGATIVE: 1
ALLERGIC/IMMUNOLOGIC NEGATIVE: 1

## 2024-09-10 ASSESSMENT — ACTIVITIES OF DAILY LIVING (ADL)
BATHING: INDEPENDENT
PATIENT'S MEMORY ADEQUATE TO SAFELY COMPLETE DAILY ACTIVITIES?: YES
WALKS IN HOME: INDEPENDENT
ADEQUATE_TO_COMPLETE_ADL: YES
GROOMING: INDEPENDENT
HEARING - LEFT EAR: FUNCTIONAL
TOILETING: INDEPENDENT
FEEDING YOURSELF: INDEPENDENT
LACK_OF_TRANSPORTATION: NO
DRESSING YOURSELF: INDEPENDENT
HEARING - RIGHT EAR: FUNCTIONAL
JUDGMENT_ADEQUATE_SAFELY_COMPLETE_DAILY_ACTIVITIES: YES

## 2024-09-10 ASSESSMENT — LIFESTYLE VARIABLES
HOW OFTEN DO YOU HAVE A DRINK CONTAINING ALCOHOL: NEVER
AUDIT-C TOTAL SCORE: 0
HOW MANY STANDARD DRINKS CONTAINING ALCOHOL DO YOU HAVE ON A TYPICAL DAY: PATIENT DOES NOT DRINK
AUDIT-C TOTAL SCORE: 0
HOW OFTEN DO YOU HAVE 6 OR MORE DRINKS ON ONE OCCASION: NEVER
SKIP TO QUESTIONS 9-10: 1
SUBSTANCE_ABUSE_PAST_12_MONTHS: NO
PRESCIPTION_ABUSE_PAST_12_MONTHS: NO

## 2024-09-10 ASSESSMENT — PAIN DESCRIPTION - PROGRESSION: CLINICAL_PROGRESSION: NOT CHANGED

## 2024-09-10 ASSESSMENT — PAIN DESCRIPTION - PAIN TYPE: TYPE: ACUTE PAIN

## 2024-09-10 ASSESSMENT — PAIN SCALES - PAIN ASSESSMENT IN ADVANCED DEMENTIA (PAINAD): TOTALSCORE: MEDICATION (SEE MAR)

## 2024-09-10 ASSESSMENT — PAIN - FUNCTIONAL ASSESSMENT
PAIN_FUNCTIONAL_ASSESSMENT: 0-10

## 2024-09-10 ASSESSMENT — PAIN DESCRIPTION - ORIENTATION
ORIENTATION: RIGHT;LEFT
ORIENTATION: LEFT

## 2024-09-10 NOTE — PROGRESS NOTES
Pharmacy Medication History Review    Bereket Schaefer is a 48 y.o. male admitted for Weakness. Pharmacy reviewed the patient's [id number] medications and allergies for accuracy.    The list below reflectives the updated PTA list. Please review each medication in order reconciliation for additional clarification and justification.  Prior to Admission Medications   Prescriptions Last Dose Informant Patient Reported? Taking?   HYDROcodone-acetaminophen (Norco)  mg tablet 9/10/2024 at am  No Yes   Sig: Take 1 tablet by mouth every 4 hours if needed for severe pain (7 - 10).   HYDROcodone-acetaminophen (Norco)  mg tablet   No No   Sig: Take 1 tablet by mouth every 4 hours if needed for severe pain (7 - 10). Do not fill before September 13, 2024.   HYDROcodone-acetaminophen (Norco)  mg tablet   No No   Sig: Take 1 tablet by mouth every 4 hours if needed for severe pain (7 - 10). Do not fill before October 13, 2024.   amLODIPine (Norvasc) 10 mg tablet 9/9/2024 at pm  No Yes   Sig: TAKE ONE TABLET BY MOUTH ONCE DAILY   atorvastatin (Lipitor) 20 mg tablet 9/9/2024 at pm  No Yes   Sig: Take 1 tablet (20 mg) by mouth once daily.   baclofen (Lioresal) 10 mg tablet Past Week  No Yes   Sig: Take 1 tablet (10 mg) by mouth 3 times a day as needed for muscle spasms (quadriceps pain).   escitalopram (Lexapro) 10 mg tablet 9/9/2024 at pm  No Yes   Sig: Take 1 tablet (10 mg) by mouth once daily.   ibuprofen 600 mg tablet   No No   Sig: Take 1 tablet (600 mg) by mouth every 8 hours if needed for moderate pain (4 - 6).   lisinopril 10 mg tablet 9/9/2024 at pm  No Yes   Sig: Take 1 tablet (10 mg) by mouth once daily. as directed   naloxone (Narcan) 4 mg/0.1 mL nasal spray   No No   Sig: Administer 1 spray (4 mg) into affected nostril(s) if needed for opioid reversal. May repeat every 2-3 minutes if needed, alternating nostrils, until medical assistance becomes available.   naproxen (Naprosyn) 500 mg tablet   No No    Sig: Take 1 tablet (500 mg) by mouth 2 times daily (morning and late afternoon).   nicotine (Nicoderm CQ) 14 mg/24 hr patch   Yes No   Sig: Place 1 patch on the skin once every 24 hours.   nicotine (Nicoderm CQ) 21 mg/24 hr patch   Yes No   Sig: Place 1 patch on the skin once every 24 hours.   tadalafil (Cialis) 5 mg tablet   No No   Sig: Take 1 tablet (5 mg) by mouth once daily.      Facility-Administered Medications: None          The list below reflectives the updated allergy list. Please review each documented allergy for additional clarification and justification.  Allergies  Reviewed by Oralia Rodriguez CPhT on 9/10/2024        Severity Reactions Comments    Aspirin Low Hives             Below are additional concerns with the patient's PTA list.  - flagged for review to be deleted:  Naproxen  Ibuprofen 600      ORALIA RODRIGUEZ CPhT

## 2024-09-10 NOTE — CARE PLAN
Problem: Pain  Goal: Takes deep breaths with improved pain control throughout the shift  Outcome: Progressing  Goal: Turns in bed with improved pain control throughout the shift  Outcome: Progressing  Goal: Walks with improved pain control throughout the shift  Outcome: Progressing  Goal: Performs ADL's with improved pain control throughout shift  Outcome: Progressing  Goal: Participates in PT with improved pain control throughout the shift  Outcome: Progressing  Goal: Free from opioid side effects throughout the shift  Outcome: Progressing  Goal: Free from acute confusion related to pain meds throughout the shift  Outcome: Progressing     Problem: Pain - Adult  Goal: Verbalizes/displays adequate comfort level or baseline comfort level  Outcome: Progressing     Problem: Safety - Adult  Goal: Free from fall injury  Outcome: Progressing     Problem: Discharge Planning  Goal: Discharge to home or other facility with appropriate resources  Outcome: Progressing     Problem: Chronic Conditions and Co-morbidities  Goal: Patient's chronic conditions and co-morbidity symptoms are monitored and maintained or improved  Outcome: Progressing   The patient's goals for the shift include get my potassium up    The clinical goals for the shift include Monitor labs and EKG changes    Over the shift, the patient did not make progress toward the following goals. Barriers to progression include . Recommendations to address these barriers include .

## 2024-09-10 NOTE — ED PROVIDER NOTES
HPI   Chief Complaint   Patient presents with    Weakness, Gen     Pt c/o feeling weak since Saturday afternoon after over exerting self at work on Friday.        HPI  See MDM      Patient History   Past Medical History:   Diagnosis Date    Elevated urine levels of drugs, medicaments and biological substances 12/28/2015    High catecholamines    Other psychoactive substance use, unspecified with withdrawal, unspecified (Multi) 05/22/2019    Drug withdrawal syndrome without complication    Other specified abnormal findings of blood chemistry 03/08/2016    Abnormal cortisol level    Personal history of other diseases of the circulatory system 01/13/2017    History of hypertension    Personal history of other diseases of the nervous system and sense organs 01/13/2017    History of blurred vision    Personal history of other diseases of the nervous system and sense organs 06/08/2016    History of migraine    Wedge compression fracture of first lumbar vertebra, initial encounter for closed fracture (Multi)     Compression fracture of L1 lumbar vertebra    Wedge compression fracture of second lumbar vertebra, initial encounter for closed fracture (Multi)     Compression fracture of L2 lumbar vertebra    Wedge compression fracture of second lumbar vertebra, initial encounter for closed fracture (Multi)     Compression fracture of L2 lumbar vertebra     Past Surgical History:   Procedure Laterality Date    LUMBAR FUSION  10/25/2013    Lumbar Vertebral Fusion    VASECTOMY  10/25/2013    Surgery Vas Deferens Vasectomy     No family history on file.  Social History     Tobacco Use    Smoking status: Every Day     Types: Cigarettes     Passive exposure: Current    Smokeless tobacco: Never   Vaping Use    Vaping status: Never Used   Substance Use Topics    Alcohol use: Not Currently    Drug use: Never       Physical Exam   ED Triage Vitals [09/10/24 1152]   Temperature Heart Rate Respirations BP   36.7 °C (98 °F) 83 16 117/78       Pulse Ox Temp Source Heart Rate Source Patient Position   96 % Tympanic -- --      BP Location FiO2 (%)     Left arm --       Physical Exam  See [facility]    ED Course & [facility]   ED Course as of 09/10/24 1508   Tue Sep 10, 2024   1158 EKG presented to me at 11:58 AM     EKG as interpreted by me shows ventricular rate of 73 bpm, normal sinus rhythm, normal axis, normal intervals, no STEMI. [DH]      ED Course User Index  [DH] aWng Osman MD         Diagnoses as of 09/10/24 1508   Hypokalemia   Generalized weakness                 No data recorded     Gruver Coma Scale Score: 15 (09/10/24 1153 : Sydney Vaca, JOHNNA)                           Medical Decision Making  48-year-old male with past medical history of hypertension who presents emergency room with generalized weakness.  Patient notes generalized weakness has been worsening the last several days.  Patient denies any significant fevers, chills, nausea, vomiting, chest pain, shortness of breath but does note bilateral lower extremity weakness.  Patient also notes right-sided arm weakness but denies any facial droop or focal deficits.    ED Triage Vitals [09/10/24 1152]   Temperature Heart Rate Respirations BP   36.7 °C (98 °F) 83 16 117/78      Pulse Ox Temp Source Heart Rate Source Patient Position   96 % Tympanic -- --      BP Location FiO2 (%)     Left arm --       Vital signs reviewed: Afebrile, nontachycardic, normotensive, 96% on room air    Exam     Constitutional: No acute distress. Resting comfortably.   Head: Normocephalic, atraumatic.   Eyes: Pupils equal bilaterally, EOM grossly intact, conjunctiva normal.  Mouth/Throat: Oropharynx is clear, moist mucus membranes.   Neck: Supple. No lymphadenopathy.  Cardiovascular: Regular rate and regular rhythm. Extremities are well-perfused.   Pulmonary/Chest: No respiratory distress, breathing comfortably on room air.    Abdominal: Soft, tenderness to palpation over bilateral groin, non-distended. No rebound or  guarding.   Musculoskeletal: No lower extremity edema.  Strength is 2 out of 5 in the left lower extremity and 3 out of 5 in the right lower extremity, 4 out of 5 in the bilateral upper extremities.      Skin: Warm, dry, and intact.   Neurological: Cranial nerves II through XII grossly intact.  Patient is oriented to person, place, time, and situation. Face symmetric, hearing intact to voice, speech normal. Moves all extremities.     Differential includes but is not limited to:  Dehydration versus electrolyte abnormality versus medication side effect versus intra-abdominal pathology      Labs: ordered.  Labs Reviewed   CBC WITH AUTO DIFFERENTIAL - Abnormal       Result Value    WBC 10.0      nRBC 0.0      RBC 4.96      Hemoglobin 15.0      Hematocrit 41.3      MCV 83      MCH 30.2      MCHC 36.3 (*)     RDW 14.9 (*)     Platelets 365      Neutrophils % 64.6      Immature Granulocytes %, Automated 0.3      Lymphocytes % 24.3      Monocytes % 8.9      Eosinophils % 1.4      Basophils % 0.5      Neutrophils Absolute 6.45      Immature Granulocytes Absolute, Automated 0.03      Lymphocytes Absolute 2.43      Monocytes Absolute 0.89      Eosinophils Absolute 0.14      Basophils Absolute 0.05     BASIC METABOLIC PANEL - Abnormal    Glucose 127 (*)     Sodium 144      Potassium <1.0 (*)     Chloride 103      Bicarbonate 28      Urea Nitrogen 11      Creatinine 0.90      eGFR >90      Calcium 8.4 (*)     Anion Gap 13     PHOSPHORUS - Abnormal    Phosphorus 1.5 (*)    URINALYSIS WITH REFLEX CULTURE AND MICROSCOPIC - Abnormal    Color, Urine Yellow      Appearance, Urine Clear      Specific Gravity, Urine 1.019      pH, Urine 6.5      Protein, Urine 100 (2+) (*)     Glucose, Urine Normal      Blood, Urine OVER (3+) (*)     Ketones, Urine NEGATIVE      Bilirubin, Urine NEGATIVE      Urobilinogen, Urine Normal      Nitrite, Urine NEGATIVE      Leukocyte Esterase, Urine 25 Adin/µL (*)     Narrative:     OVER is reported when the  result is greater than the clinically reportable range.   MICROSCOPIC ONLY, URINE - Abnormal    WBC, Urine 6-10 (*)     RBC, Urine NONE      Squamous Epithelial Cells, Urine 1-9 (SPARSE)      Mucus, Urine FEW     BASIC METABOLIC PANEL - Abnormal    Glucose 102 (*)     Sodium 145      Potassium <1.5 (*)     Chloride 105      Bicarbonate 28      Urea Nitrogen 10      Creatinine 0.90      eGFR >90      Calcium 8.3 (*)     Anion Gap 12     CREATINE KINASE - Abnormal    Creatine Kinase 3,711 (*)    MAGNESIUM - Normal    Magnesium 1.80     SARS-COV-2 PCR - Normal    Coronavirus 2019, PCR Not Detected      Narrative:     This assay has received FDA Emergency Use Authorization (EUA) and is only authorized for the duration of time that circumstances exist to justify the authorization of the emergency use of in vitro diagnostic tests for the detection of SARS-CoV-2 virus and/or diagnosis of COVID-19 infection under section 564(b)(1) of the Act, 21 U.S.C. 360bbb-3(b)(1). This assay is an in vitro diagnostic nucleic acid amplification test for the qualitative detection of SARS-CoV-2 from nasopharyngeal specimens and has been validated for use at [facility]. Negative results do not preclude COVID-19 infections and should not be used as the sole basis for diagnosis, treatment, or other management decisions.     URINE CULTURE   URINALYSIS WITH REFLEX CULTURE AND MICROSCOPIC    Narrative:     The following orders were created for panel order Urinalysis with Reflex Culture and Microscopic.  Procedure                               Abnormality         Status                     ---------                               -----------         ------                     Urinalysis with Reflex C...[[id number]]  Abnormal            Final result               Extra Urine Gray Tube[[id number]]                                                         Please view results for these tests on the individual orders.   EXTRA URINE  SALINAS TUBE       Radiology: ordered and independent interpretation performed.  CT Scan(s) CT abdomen pelvis as interpreted by me shows no large dilated loops of bowel to suggest bowel obstruction    ECG/medicine tests: ordered and independent interpretation performed.  ED Course as of 09/10/24 1510   Tue Sep 10, 2024   1158 EKG presented to me at 11:58 AM     EKG as interpreted by me shows ventricular rate of 73 bpm, normal sinus rhythm, normal axis, normal intervals, no STEMI. [DH]      ED Course User Index  [DH] Wang Osman MD         Diagnoses as of 09/10/24 1510   Hypokalemia   Generalized weakness     Lab work as interpreted by me shows no significant leukocytosis or anemia on CBC otherwise BMP without significant RICHIE but does show hypokalemia to less than 1, creatinine within normal limits and patient without anion gap.  Urinalysis shows no evidence of urinary tract infection.  CK is mildly elevated but creatinine is within normal limits.  Phos is also down mildly, will replete.  Magnesium within normal limits.    Given patient's significant hypokalemia and findings of U waves on EKG at this time will require repletion.    Discussion of management or test interpretation with external provider(s):  I personally discussed the patient's management with Dr. Garcia and Dr. Lorenzo, nephrologist and hospitalist respectively, who recommends workup for primary hyperaldosteronism as well as slow and steady repletion during ICU stay with every 2 hour reevaluations.    ED Medications managed:    Medications   potassium phosphates 21 mmol in dextrose 5% 250 mL IV (21 mmol intravenous New Bag 9/10/24 1446)   sodium chloride 0.9 % bolus 1,000 mL (0 mL intravenous Stopped 9/10/24 1404)   potassium chloride CR (Klor-Con M20) ER tablet 40 mEq (40 mEq oral Given 9/10/24 1404)   iohexol (OMNIPaque) 350 mg iodine/mL solution 75 mL (75 mL intravenous Given 9/10/24 1422)     Critical Care Procedure Note    Authorized and Performed  by: Wang Osman MD  Total critical care time: 35 minutes  Due to a high probability of clinically significant, life threatening deterioration, the patient required my highest level of preparedness to intervene emergently and I personally spent this critical care time directly and personally managing the patient. This critical care time included obtaining a history; examining the patient; pulse oximetry; ordering and review of studies; arranging urgent treatment with development of a management plan; evaluation of patient's response to treatment; frequent reassessment; and, discussions with other providers.  This critical care time was performed to assess and manage the high probability of imminent, life-threatening deterioration that could result in multi-organ failure. It was exclusive of separately billable procedures and treating other patients and teaching time.    Please see MDM section and the rest of the note for further information on patient assessment and treatment.  Wang Osman MD  3:10 PM    Attending Emergency Physician  [facility] EMERGENCY MEDICINE            Procedure  Procedures     Wang Osman MD  09/10/24 4044

## 2024-09-10 NOTE — H&P
History Of Present Illness  Bereket Schaefer is a 48 y.o. male presenting with profound weakness.  This patient who is a  and he is on chronic pain medicine Vicodin for chronic back pain developed profound weakness of his entire body but especially lower extremities and some lower abdominal or upper leg pain and for that reason came to the emergency room.  He was found to have profoundly low potassium abdominal scan was mostly unremarkable.  Initial potassium replacement was done by the ER physician after he spoke with renal consultant.  Admission was then requested to the ICU.  The patient's main complaint is profound weakness in the arms and the legs and also some leg and arm pain as well.  Denies any shortness of breath denies nausea vomiting admitted to one-time liquid's bowel movement but no other diarrhea no fever no chills no cough no shortness of breath no acute vision speech problems     Past Medical History  He has a past medical history of Elevated urine levels of drugs, medicaments and biological substances (12/28/2015), Hypertension, Other psychoactive substance use, unspecified with withdrawal, unspecified (Multi) (05/22/2019), Other specified abnormal findings of blood chemistry (03/08/2016), Personal history of other diseases of the circulatory system (01/13/2017), Personal history of other diseases of the nervous system and sense organs (01/13/2017), Personal history of other diseases of the nervous system and sense organs (06/08/2016), Wedge compression fracture of first lumbar vertebra, initial encounter for closed fracture (Multi), Wedge compression fracture of second lumbar vertebra, initial encounter for closed fracture (Multi), and Wedge compression fracture of second lumbar vertebra, initial encounter for closed fracture (Multi).  As above  Surgical History  He has a past surgical history that includes Lumbar fusion (10/25/2013); Vasectomy (10/25/2013); and Back  surgery.  Reviewed  Social History  He reports that he has been smoking cigarettes. He has a 30 pack-year smoking history. He has been exposed to tobacco smoke. He has never used smokeless tobacco. He reports that he does not currently use alcohol. He reports that he does not use drugs.  He smokes heavily denies alcohol denies illicit drugs  Family History  No family history on file.     Allergies  Aspirin    ROS  Review of Systems   Constitutional:  Positive for fatigue.   HENT: Negative.     Eyes: Negative.    Respiratory: Negative.     Cardiovascular: Negative.    Gastrointestinal: Negative.    Endocrine: Negative.    Genitourinary: Negative.    Musculoskeletal: Negative.    Skin: Negative.    Allergic/Immunologic: Negative.    Neurological:  Positive for weakness.   Hematological: Negative.    Psychiatric/Behavioral: Negative.     All other systems reviewed and are negative.       Last Recorded Vitals  /89 (BP Location: Left arm, Patient Position: Lying)   Pulse 71   Temp 36.7 °C (98.1 °F) (Temporal)   Resp 17   Wt 73.5 kg (162 lb 0.6 oz)   SpO2 96%     Physical Exam  Assessed patient emergency room bed #20 in the presence of his wife.  This is a  male who is quite thin alert oriented x 3 cooperative no distress  Normocephalic/atraumatic EOMI PERRLA  Neck supple no JVD  Chest clear  Heart regular  Abdomen soft nontender Trillat examination positive bowel sounds  Extremities there is no edema there are palpable pedal and radial pulses there is also some diffuse muscle pain on palpation major muscle groups and the pain is mostly pronounced in the proximal quadriceps muscles in both thighs there is no evidence for compartment syndrome in any way.  Neurologic exam there is profound overall weakness with decreased DTRs speech cranial nerves normal  Psych no psychosis  Skin no rash    Relevant Results  Reviewed    ASSESSMENT/PLAN  Assessment/Plan   Principal Problem:    Hypokalemia    September  10:    Profound hypokalemia  Profound weakness  Rhabdomyolysis  Chronic pain  Hypertension    Case was discussed in great detail with Dr. ANA Garcia from nephrology.  Advised saline at 100, frequent potassium magnesium and phosphorus checks slow correction potassium in the ICU setting.  At this point is believed etiology is most likely renal, things to consider is licorice ingestion primary hypoaldosteronism or other.  Follow electrolytes closely follow CK levels closely okay for diet check troponins proBNP       Ayde Lorenzo MD

## 2024-09-11 ENCOUNTER — APPOINTMENT (OUTPATIENT)
Dept: CARDIOLOGY | Facility: HOSPITAL | Age: 49
End: 2024-09-11

## 2024-09-11 LAB
ALBUMIN SERPL-MCNC: 2.8 G/DL (ref 3.5–5)
ALP BLD-CCNC: 78 U/L (ref 35–125)
ALT SERPL-CCNC: 67 U/L (ref 5–40)
AMPHETAMINES UR QL SCN>1000 NG/ML: NEGATIVE
ANION GAP SERPL CALC-SCNC: 10 MMOL/L
ANION GAP SERPL CALC-SCNC: 11 MMOL/L
ANION GAP SERPL CALC-SCNC: 12 MMOL/L
ANION GAP SERPL CALC-SCNC: 13 MMOL/L
AORTIC VALVE MEAN GRADIENT: 5 MMHG
AORTIC VALVE PEAK VELOCITY: 1.52 M/S
AST SERPL-CCNC: 150 U/L (ref 5–40)
AV PEAK GRADIENT: 9.2 MMHG
AVA (PEAK VEL): 2.69 CM2
AVA (VTI): 2.57 CM2
BARBITURATES UR QL SCN>300 NG/ML: NEGATIVE
BENZODIAZ UR QL SCN>300 NG/ML: NEGATIVE
BILIRUB SERPL-MCNC: 0.5 MG/DL (ref 0.1–1.2)
BUN SERPL-MCNC: 10 MG/DL (ref 8–25)
BUN SERPL-MCNC: 5 MG/DL (ref 8–25)
BUN SERPL-MCNC: 5 MG/DL (ref 8–25)
BUN SERPL-MCNC: 6 MG/DL (ref 8–25)
BUN SERPL-MCNC: 6 MG/DL (ref 8–25)
BUN SERPL-MCNC: 7 MG/DL (ref 8–25)
BUN SERPL-MCNC: 8 MG/DL (ref 8–25)
BUN SERPL-MCNC: 9 MG/DL (ref 8–25)
BUN SERPL-MCNC: 9 MG/DL (ref 8–25)
BZE UR QL SCN>300 NG/ML: NEGATIVE
CALCIUM SERPL-MCNC: 7.2 MG/DL (ref 8.5–10.4)
CALCIUM SERPL-MCNC: 7.3 MG/DL (ref 8.5–10.4)
CALCIUM SERPL-MCNC: 7.5 MG/DL (ref 8.5–10.4)
CALCIUM SERPL-MCNC: 7.5 MG/DL (ref 8.5–10.4)
CALCIUM SERPL-MCNC: 7.6 MG/DL (ref 8.5–10.4)
CALCIUM SERPL-MCNC: 7.7 MG/DL (ref 8.5–10.4)
CALCIUM SERPL-MCNC: 7.8 MG/DL (ref 8.5–10.4)
CALCIUM SERPL-MCNC: 7.9 MG/DL (ref 8.5–10.4)
CANNABINOIDS UR QL SCN>50 NG/ML: NEGATIVE
CEA SERPL-MCNC: 1.4 UG/L
CHLORIDE SERPL-SCNC: 107 MMOL/L (ref 97–107)
CHLORIDE SERPL-SCNC: 107 MMOL/L (ref 97–107)
CHLORIDE SERPL-SCNC: 108 MMOL/L (ref 97–107)
CHLORIDE SERPL-SCNC: 109 MMOL/L (ref 97–107)
CHLORIDE SERPL-SCNC: 110 MMOL/L (ref 97–107)
CHLORIDE SERPL-SCNC: 111 MMOL/L (ref 97–107)
CK SERPL-CCNC: 5486 U/L (ref 24–195)
CO2 SERPL-SCNC: 22 MMOL/L (ref 24–31)
CO2 SERPL-SCNC: 24 MMOL/L (ref 24–31)
CO2 SERPL-SCNC: 25 MMOL/L (ref 24–31)
CO2 SERPL-SCNC: 25 MMOL/L (ref 24–31)
CO2 SERPL-SCNC: 26 MMOL/L (ref 24–31)
CREAT SERPL-MCNC: 0.7 MG/DL (ref 0.4–1.6)
CREAT SERPL-MCNC: 0.8 MG/DL (ref 0.4–1.6)
CREAT SERPL-MCNC: 0.9 MG/DL (ref 0.4–1.6)
CREAT UR-MCNC: 50.1 MG/DL
CRP SERPL HS-MCNC: 8.2 MG/L
EGFRCR SERPLBLD CKD-EPI 2021: >90 ML/MIN/1.73M*2
EJECTION FRACTION APICAL 4 CHAMBER: 50.6
EJECTION FRACTION: 58 %
ERYTHROCYTE [DISTWIDTH] IN BLOOD BY AUTOMATED COUNT: 15.2 % (ref 11.5–14.5)
FENTANYL+NORFENTANYL UR QL SCN: NEGATIVE
GLUCOSE SERPL-MCNC: 100 MG/DL (ref 65–99)
GLUCOSE SERPL-MCNC: 108 MG/DL (ref 65–99)
GLUCOSE SERPL-MCNC: 112 MG/DL (ref 65–99)
GLUCOSE SERPL-MCNC: 114 MG/DL (ref 65–99)
GLUCOSE SERPL-MCNC: 117 MG/DL (ref 65–99)
GLUCOSE SERPL-MCNC: 120 MG/DL (ref 65–99)
GLUCOSE SERPL-MCNC: 128 MG/DL (ref 65–99)
GLUCOSE SERPL-MCNC: 128 MG/DL (ref 65–99)
GLUCOSE SERPL-MCNC: 135 MG/DL (ref 65–99)
GLUCOSE SERPL-MCNC: 145 MG/DL (ref 65–99)
GLUCOSE SERPL-MCNC: 155 MG/DL (ref 65–99)
HCT VFR BLD AUTO: 30.7 % (ref 41–52)
HGB BLD-MCNC: 11.1 G/DL (ref 13.5–17.5)
LEFT ATRIUM VOLUME AREA LENGTH INDEX BSA: 17.6 ML/M2
LEFT VENTRICLE INTERNAL DIMENSION DIASTOLE: 4.58 CM (ref 3.5–6)
LEFT VENTRICULAR OUTFLOW TRACT DIAMETER: 2 CM
MAGNESIUM SERPL-MCNC: 1.7 MG/DL (ref 1.6–3.1)
MAGNESIUM SERPL-MCNC: 1.9 MG/DL (ref 1.6–3.1)
MAGNESIUM SERPL-MCNC: 1.9 MG/DL (ref 1.6–3.1)
MCH RBC QN AUTO: 30.1 PG (ref 26–34)
MCHC RBC AUTO-ENTMCNC: 36.2 G/DL (ref 32–36)
MCV RBC AUTO: 83 FL (ref 80–100)
METHADONE UR QL SCN>300 NG/ML: NEGATIVE
MITRAL VALVE E/A RATIO: 1.37
NRBC BLD-RTO: 0 /100 WBCS (ref 0–0)
OPIATES UR QL SCN>300 NG/ML: POSITIVE
OXYCODONE UR QL: POSITIVE
PCP UR QL SCN>25 NG/ML: NEGATIVE
PHOSPHATE SERPL-MCNC: 2.3 MG/DL (ref 2.5–4.5)
PHOSPHATE SERPL-MCNC: 2.9 MG/DL (ref 2.5–4.5)
PHOSPHATE SERPL-MCNC: 3.1 MG/DL (ref 2.5–4.5)
PLATELET # BLD AUTO: 246 X10*3/UL (ref 150–450)
POTASSIUM SERPL-SCNC: 1.7 MMOL/L (ref 3.4–5.1)
POTASSIUM SERPL-SCNC: 1.8 MMOL/L (ref 3.4–5.1)
POTASSIUM SERPL-SCNC: 1.9 MMOL/L (ref 3.4–5.1)
POTASSIUM SERPL-SCNC: 2 MMOL/L (ref 3.4–5.1)
POTASSIUM SERPL-SCNC: 2.2 MMOL/L (ref 3.4–5.1)
POTASSIUM SERPL-SCNC: 2.3 MMOL/L (ref 3.4–5.1)
POTASSIUM UR-SCNC: 13 MMOL/L
POTASSIUM/CREAT UR-RTO: 26 MMOL/G CREAT
PROT SERPL-MCNC: 5.1 G/DL (ref 5.9–7.9)
RBC # BLD AUTO: 3.69 X10*6/UL (ref 4.5–5.9)
RIGHT VENTRICLE FREE WALL PEAK S': 13.5 CM/S
SODIUM SERPL-SCNC: 143 MMOL/L (ref 133–145)
SODIUM SERPL-SCNC: 144 MMOL/L (ref 133–145)
SODIUM SERPL-SCNC: 144 MMOL/L (ref 133–145)
SODIUM SERPL-SCNC: 145 MMOL/L (ref 133–145)
SODIUM SERPL-SCNC: 146 MMOL/L (ref 133–145)
SODIUM SERPL-SCNC: 147 MMOL/L (ref 133–145)
TRICUSPID ANNULAR PLANE SYSTOLIC EXCURSION: 2.9 CM
WBC # BLD AUTO: 8.3 X10*3/UL (ref 4.4–11.3)

## 2024-09-11 PROCEDURE — 80048 BASIC METABOLIC PNL TOTAL CA: CPT | Mod: CCI | Performed by: INTERNAL MEDICINE

## 2024-09-11 PROCEDURE — 93306 TTE W/DOPPLER COMPLETE: CPT | Performed by: INTERNAL MEDICINE

## 2024-09-11 PROCEDURE — 2500000004 HC RX 250 GENERAL PHARMACY W/ HCPCS (ALT 636 FOR OP/ED): Performed by: INTERNAL MEDICINE

## 2024-09-11 PROCEDURE — 2500000004 HC RX 250 GENERAL PHARMACY W/ HCPCS (ALT 636 FOR OP/ED): Performed by: STUDENT IN AN ORGANIZED HEALTH CARE EDUCATION/TRAINING PROGRAM

## 2024-09-11 PROCEDURE — 85027 COMPLETE CBC AUTOMATED: CPT | Performed by: INTERNAL MEDICINE

## 2024-09-11 PROCEDURE — 2500000002 HC RX 250 W HCPCS SELF ADMINISTERED DRUGS (ALT 637 FOR MEDICARE OP, ALT 636 FOR OP/ED): Performed by: INTERNAL MEDICINE

## 2024-09-11 PROCEDURE — 93306 TTE W/DOPPLER COMPLETE: CPT

## 2024-09-11 PROCEDURE — 86038 ANTINUCLEAR ANTIBODIES: CPT | Mod: TRILAB,WESLAB | Performed by: INTERNAL MEDICINE

## 2024-09-11 PROCEDURE — 80307 DRUG TEST PRSMV CHEM ANLYZR: CPT | Performed by: INTERNAL MEDICINE

## 2024-09-11 PROCEDURE — 36415 COLL VENOUS BLD VENIPUNCTURE: CPT | Performed by: INTERNAL MEDICINE

## 2024-09-11 PROCEDURE — 99222 1ST HOSP IP/OBS MODERATE 55: CPT | Performed by: INTERNAL MEDICINE

## 2024-09-11 PROCEDURE — 82374 ASSAY BLOOD CARBON DIOXIDE: CPT | Performed by: STUDENT IN AN ORGANIZED HEALTH CARE EDUCATION/TRAINING PROGRAM

## 2024-09-11 PROCEDURE — 82550 ASSAY OF CK (CPK): CPT | Performed by: INTERNAL MEDICINE

## 2024-09-11 PROCEDURE — 84100 ASSAY OF PHOSPHORUS: CPT | Performed by: INTERNAL MEDICINE

## 2024-09-11 PROCEDURE — 2500000002 HC RX 250 W HCPCS SELF ADMINISTERED DRUGS (ALT 637 FOR MEDICARE OP, ALT 636 FOR OP/ED): Performed by: STUDENT IN AN ORGANIZED HEALTH CARE EDUCATION/TRAINING PROGRAM

## 2024-09-11 PROCEDURE — 2580000001 HC RX 258 IV SOLUTIONS: Performed by: STUDENT IN AN ORGANIZED HEALTH CARE EDUCATION/TRAINING PROGRAM

## 2024-09-11 PROCEDURE — 99223 1ST HOSP IP/OBS HIGH 75: CPT | Performed by: SURGERY

## 2024-09-11 PROCEDURE — S4991 NICOTINE PATCH NONLEGEND: HCPCS | Performed by: INTERNAL MEDICINE

## 2024-09-11 PROCEDURE — 2020000001 HC ICU ROOM DAILY

## 2024-09-11 PROCEDURE — 2500000001 HC RX 250 WO HCPCS SELF ADMINISTERED DRUGS (ALT 637 FOR MEDICARE OP): Performed by: REGISTERED NURSE

## 2024-09-11 PROCEDURE — 82085 ASSAY OF ALDOLASE: CPT | Performed by: INTERNAL MEDICINE

## 2024-09-11 PROCEDURE — 2780000003 HC OR 278 NO HCPCS

## 2024-09-11 PROCEDURE — 36573 INSJ PICC RS&I 5 YR+: CPT

## 2024-09-11 PROCEDURE — C1751 CATH, INF, PER/CENT/MIDLINE: HCPCS

## 2024-09-11 PROCEDURE — 2500000004 HC RX 250 GENERAL PHARMACY W/ HCPCS (ALT 636 FOR OP/ED): Performed by: REGISTERED NURSE

## 2024-09-11 PROCEDURE — 83735 ASSAY OF MAGNESIUM: CPT | Performed by: INTERNAL MEDICINE

## 2024-09-11 PROCEDURE — 80048 BASIC METABOLIC PNL TOTAL CA: CPT | Mod: CCI | Performed by: STUDENT IN AN ORGANIZED HEALTH CARE EDUCATION/TRAINING PROGRAM

## 2024-09-11 PROCEDURE — 2500000001 HC RX 250 WO HCPCS SELF ADMINISTERED DRUGS (ALT 637 FOR MEDICARE OP): Performed by: INTERNAL MEDICINE

## 2024-09-11 PROCEDURE — 82378 CARCINOEMBRYONIC ANTIGEN: CPT | Mod: TRILAB,WESLAB | Performed by: NURSE PRACTITIONER

## 2024-09-11 PROCEDURE — 86141 C-REACTIVE PROTEIN HS: CPT | Mod: TRILAB,WESLAB | Performed by: INTERNAL MEDICINE

## 2024-09-11 RX ORDER — POTASSIUM CHLORIDE 14.9 MG/ML
20 INJECTION INTRAVENOUS
Status: COMPLETED | OUTPATIENT
Start: 2024-09-11 | End: 2024-09-12

## 2024-09-11 RX ORDER — POTASSIUM CHLORIDE 20 MEQ/1
20 TABLET, EXTENDED RELEASE ORAL
Status: COMPLETED | OUTPATIENT
Start: 2024-09-11 | End: 2024-09-11

## 2024-09-11 RX ORDER — KETOROLAC TROMETHAMINE 30 MG/ML
30 INJECTION, SOLUTION INTRAMUSCULAR; INTRAVENOUS EVERY 6 HOURS PRN
Status: DISCONTINUED | OUTPATIENT
Start: 2024-09-11 | End: 2024-09-14

## 2024-09-11 RX ORDER — POTASSIUM CHLORIDE 20 MEQ/1
40 TABLET, EXTENDED RELEASE ORAL ONCE
Status: COMPLETED | OUTPATIENT
Start: 2024-09-11 | End: 2024-09-11

## 2024-09-11 RX ORDER — DIPHENHYDRAMINE HCL 25 MG
25 TABLET ORAL EVERY 6 HOURS PRN
Status: DISCONTINUED | OUTPATIENT
Start: 2024-09-11 | End: 2024-09-16 | Stop reason: HOSPADM

## 2024-09-11 RX ORDER — POTASSIUM CHLORIDE 20 MEQ/1
40 TABLET, EXTENDED RELEASE ORAL EVERY 4 HOURS
Status: COMPLETED | OUTPATIENT
Start: 2024-09-11 | End: 2024-09-11

## 2024-09-11 RX ORDER — POTASSIUM CHLORIDE 14.9 MG/ML
20 INJECTION INTRAVENOUS
Status: COMPLETED | OUTPATIENT
Start: 2024-09-11 | End: 2024-09-11

## 2024-09-11 RX ORDER — POTASSIUM CHLORIDE 14.9 MG/ML
20 INJECTION INTRAVENOUS ONCE
Status: COMPLETED | OUTPATIENT
Start: 2024-09-11 | End: 2024-09-11

## 2024-09-11 RX ORDER — SODIUM CHLORIDE AND POTASSIUM CHLORIDE 300; 900 MG/100ML; MG/100ML
175 INJECTION, SOLUTION INTRAVENOUS CONTINUOUS
Status: DISCONTINUED | OUTPATIENT
Start: 2024-09-11 | End: 2024-09-12

## 2024-09-11 RX ORDER — LIDOCAINE HYDROCHLORIDE 10 MG/ML
5 INJECTION, SOLUTION INFILTRATION; PERINEURAL ONCE
Status: DISCONTINUED | OUTPATIENT
Start: 2024-09-11 | End: 2024-09-16 | Stop reason: HOSPADM

## 2024-09-11 ASSESSMENT — ENCOUNTER SYMPTOMS
HEADACHES: 0
FEVER: 0
ADENOPATHY: 0
HEMATOLOGIC/LYMPHATIC NEGATIVE: 1
DIARRHEA: 0
RESPIRATORY NEGATIVE: 1
APNEA: 0
BACK PAIN: 1
DYSURIA: 0
CONSTITUTIONAL NEGATIVE: 1
NUMBNESS: 0
ALLERGIC/IMMUNOLOGIC NEGATIVE: 1
SHORTNESS OF BREATH: 0
DIZZINESS: 0
HEMATURIA: 0
LIGHT-HEADEDNESS: 0
NAUSEA: 0
SPEECH DIFFICULTY: 0
RECTAL PAIN: 0
WEAKNESS: 1
CARDIOVASCULAR NEGATIVE: 1
PHOTOPHOBIA: 0
PALPITATIONS: 0
NECK PAIN: 0
SORE THROAT: 0
BRUISES/BLEEDS EASILY: 0
WHEEZING: 0
CHILLS: 0
PSYCHIATRIC NEGATIVE: 1
NEUROLOGICAL NEGATIVE: 1
HALLUCINATIONS: 0
ENDOCRINE NEGATIVE: 1
ABDOMINAL PAIN: 0
GASTROINTESTINAL NEGATIVE: 1
SEIZURES: 0
COLOR CHANGE: 0
JOINT SWELLING: 0
VOMITING: 0
SINUS PRESSURE: 0
MUSCULOSKELETAL NEGATIVE: 1
TREMORS: 0
FATIGUE: 1
FREQUENCY: 0
CONFUSION: 1
BLOOD IN STOOL: 0
WOUND: 0
COUGH: 0
SLEEP DISTURBANCE: 0
ARTHRALGIAS: 1
EYES NEGATIVE: 1
POLYDIPSIA: 0
POLYPHAGIA: 0
MYALGIAS: 1
CONSTIPATION: 0

## 2024-09-11 ASSESSMENT — PAIN SCALES - GENERAL
PAINLEVEL_OUTOF10: 0 - NO PAIN
PAINLEVEL_OUTOF10: 6
PAINLEVEL_OUTOF10: 3
PAINLEVEL_OUTOF10: 6
PAINLEVEL_OUTOF10: 7
PAINLEVEL_OUTOF10: 1
PAINLEVEL_OUTOF10: 6
PAINLEVEL_OUTOF10: 0 - NO PAIN
PAINLEVEL_OUTOF10: 0 - NO PAIN

## 2024-09-11 ASSESSMENT — PAIN DESCRIPTION - DESCRIPTORS
DESCRIPTORS: DISCOMFORT;ACHING
DESCRIPTORS: ACHING;DISCOMFORT

## 2024-09-11 ASSESSMENT — PAIN DESCRIPTION - ORIENTATION: ORIENTATION: LOWER

## 2024-09-11 ASSESSMENT — PAIN SCALES - WONG BAKER: WONGBAKER_NUMERICALRESPONSE: NO HURT

## 2024-09-11 ASSESSMENT — PAIN - FUNCTIONAL ASSESSMENT
PAIN_FUNCTIONAL_ASSESSMENT: 0-10

## 2024-09-11 ASSESSMENT — PAIN DESCRIPTION - LOCATION
LOCATION: BACK
LOCATION: BACK

## 2024-09-11 NOTE — PROGRESS NOTES
Occupational Therapy                 Therapy Communication Note    Patient Name: Bereket Schaefer  MRN: [id number]  Department: TRI 4 ICU  Room: [facility]  Today's Date: 9/11/2024     Discipline: Occupational Therapy    Missed Visit Reason: Missed Visit Reason: Other (Comment) (Patient's potassium levels remain under 2)    Missed Time: Cancel    Comment: OT eval deferred

## 2024-09-11 NOTE — CONSULTS
.Reason For Consult  Profound hypokalemia and rhabdomyolysis    History Of Present Illness  Bereket Schaefer is a 48 y.o. male who is known to have a history of hypertension diagnosed about 5 years ago treated with amlodipine and lisinopril into the patient and his wife his blood pressure has been doing very well since then patient is being treated also for back pain muscle pain he is on baclofen and he takes NSAIDs he also had hyperlipidemia for which he takes a statin patient has not had a blood test since February 2022 can get his labs prior to that he did have multiple episodes of hypokalemia potassium ranging between 2.9 and 3.4 denies taking any diuretics denies any GI symptoms no nausea vomiting diarrhea abdominal pain apparently on Saturday the patient started having some confusion in 20 to his wife he has not been feeling well he had strokelike symptoms specially on the right side according to his wife refused to come to the hospital however yesterday he was extremely weak coming for muscle pain he could hardly get up and walk so he decided come into the emergency room for further evaluation in the ER he was found to have a potassium less than 1 and elevated CPK with acute rhabdomyolysis I was contacted by the emergency room discussed the case in details and started correcting his potassium slowly to avoid rapid correction the patient still have muscle pain however he is awake and responsive no nausea vomiting diarrhea.  Patient is denying consumption of licorice     Review of Systems  Review of Systems   Constitutional:  Positive for fatigue. Negative for chills and fever.   HENT:  Negative for sinus pressure, sore throat and tinnitus.    Eyes:  Negative for photophobia and visual disturbance.   Respiratory:  Negative for apnea, cough, shortness of breath and wheezing.    Cardiovascular:  Negative for chest pain, palpitations and leg swelling.   Gastrointestinal:  Negative for abdominal pain, blood in stool,  constipation, diarrhea, nausea, rectal pain and vomiting.   Endocrine: Negative for cold intolerance, heat intolerance, polydipsia, polyphagia and polyuria.   Genitourinary:  Negative for decreased urine volume, dysuria, frequency, hematuria and urgency.   Musculoskeletal:  Positive for arthralgias, back pain and myalgias. Negative for joint swelling and neck pain.   Skin:  Negative for color change, pallor, rash and wound.   Neurological:  Positive for weakness. Negative for dizziness, tremors, seizures, syncope, speech difficulty, light-headedness, numbness and headaches.   Hematological:  Negative for adenopathy. Does not bruise/bleed easily.   Psychiatric/Behavioral:  Positive for confusion. Negative for hallucinations, sleep disturbance and suicidal ideas.         Past Medical History  He has a past medical history of Elevated urine levels of drugs, medicaments and biological substances (12/28/2015), Hypertension, Other psychoactive substance use, unspecified with withdrawal, unspecified (Multi) (05/22/2019), Other specified abnormal findings of blood chemistry (03/08/2016), Personal history of other diseases of the circulatory system (01/13/2017), Personal history of other diseases of the nervous system and sense organs (01/13/2017), Personal history of other diseases of the nervous system and sense organs (06/08/2016), Wedge compression fracture of first lumbar vertebra, initial encounter for closed fracture (Multi), Wedge compression fracture of second lumbar vertebra, initial encounter for closed fracture (Multi), and Wedge compression fracture of second lumbar vertebra, initial encounter for closed fracture (Multi).    Surgical History  He has a past surgical history that includes Lumbar fusion (10/25/2013); Vasectomy (10/25/2013); and Back surgery.     Social History  He reports that he has been smoking cigarettes. He has a 30 pack-year smoking history. He has been exposed to tobacco smoke. He has never  used smokeless tobacco. He reports that he does not currently use alcohol. He reports that he does not use drugs.    Family History  No family history on file.     Current Facility-Administered Medications:     acetaminophen (Tylenol) tablet 650 mg, 650 mg, oral, q4h PRN **OR** acetaminophen (Tylenol) oral liquid 650 mg, 650 mg, oral, q4h PRN **OR** acetaminophen (Tylenol) suppository 650 mg, 650 mg, rectal, q4h PRN, Ayde Lorenzo MD    alteplase (Cathflo Activase) injection 2 mg, 2 mg, intra-catheter, PRN, Ayde Lorenzo MD    amLODIPine (Norvasc) tablet 10 mg, 10 mg, oral, Daily, Ayde Lorenzo MD, 10 mg at 09/11/24 0801    enoxaparin (Lovenox) syringe 40 mg, 40 mg, subcutaneous, q24h, Ayde Lorenzo MD, 40 mg at 09/10/24 2010    escitalopram (Lexapro) tablet 10 mg, 10 mg, oral, Daily, Ayde Lorenzo MD, 10 mg at 09/10/24 2010    lidocaine (Xylocaine) 10 mg/mL (1 %) injection 5 mL, 5 mL, infiltration, Once, Ayde Lorenzo MD    nicotine (Nicoderm CQ) 21 mg/24 hr patch 1 patch, 1 patch, transdermal, Daily, Ayde Lorenzo MD, 1 patch at 09/11/24 0801    oxyCODONE (Roxicodone) immediate release tablet 5 mg, 5 mg, oral, q6h PRN, Ayde Lorenzo MD, 5 mg at 09/11/24 0603    pantoprazole (ProtoNix) EC tablet 40 mg, 40 mg, oral, Daily before breakfast, 40 mg at 09/11/24 0603 **OR** pantoprazole (ProtoNix) injection 40 mg, 40 mg, intravenous, Daily before breakfast, Ayde Lorenzo MD    piperacillin-tazobactam (Zosyn) 3.375 g in dextrose (iso) IV 50 mL, 3.375 g, intravenous, q6h, Ayde Lorenzo MD, Stopped at 09/11/24 0638    potassium chloride 20 mEq in sterile water for injection 100 mL, 20 mEq, intravenous, Once, Ayde Lorenzo MD, Last Rate: 50 mL/hr at 09/11/24 0901, 20 mEq at 09/11/24 0901    potassium chloride CR (Klor-Con M20) ER tablet 20 mEq, 20 mEq, oral, q2h, Ayde Lorenzo MD, 20 mEq at 09/11/24 0902    sodium  chloride 0.9% infusion, 125 mL/hr, intravenous, Continuous, Ayde Lorenzo MD, Last Rate: 125 mL/hr at 09/11/24 0859, 125 mL/hr at 09/11/24 0859   Allergies  Aspirin         Physical Exam  Physical Exam  Constitutional:       General: He is not in acute distress.     Appearance: He is not toxic-appearing.   HENT:      Head: Normocephalic and atraumatic.   Eyes:      Extraocular Movements: Extraocular movements intact.      Pupils: Pupils are equal, round, and reactive to light.   Neck:      Vascular: No carotid bruit.   Cardiovascular:      Rate and Rhythm: Normal rate and regular rhythm.   Pulmonary:      Effort: No respiratory distress.      Breath sounds: No stridor. No wheezing, rhonchi or rales.   Chest:      Chest wall: No tenderness.   Abdominal:      General: There is no distension.      Palpations: There is no mass.      Tenderness: There is no abdominal tenderness. There is no right CVA tenderness, left CVA tenderness or guarding.      Hernia: No hernia is present.   Musculoskeletal:         General: No swelling or tenderness.      Cervical back: No rigidity.      Right lower leg: No edema.      Left lower leg: No edema.   Lymphadenopathy:      Cervical: No cervical adenopathy.   Skin:     General: Skin is warm and dry.      Coloration: Skin is not jaundiced or pale.      Findings: No bruising or erythema.   Neurological:      General: No focal deficit present.      Mental Status: He is alert and oriented to person, place, and time.   Psychiatric:         Mood and Affect: Mood normal.         Behavior: Behavior normal.              I&O 24HR    Intake/Output Summary (Last 24 hours) at 9/11/2024 0905  Last data filed at 9/11/2024 0755  Gross per 24 hour   Intake 2237.08 ml   Output 1045 ml   Net 1192.08 ml       Vitals 24HR  Heart Rate:  [60-83]   Temp:  [36.2 °C (97.2 °F)-36.9 °C (98.4 °F)]   Resp:  [12-22]   BP: (100-143)/(54-89)   Height:  [182.9 cm (6')]   Weight:  [73.5 kg (162 lb 0.6 oz)]    SpO2:  [91 %-98 %]     Relevant Results        Results for orders placed or performed during the hospital encounter of 09/10/24 (from the past 96 hour(s))   CBC and Auto Differential   Result Value Ref Range    WBC 10.0 4.4 - 11.3 x10*3/uL    nRBC 0.0 0.0 - 0.0 /100 WBCs    RBC 4.96 4.50 - 5.90 x10*6/uL    Hemoglobin 15.0 13.5 - 17.5 g/dL    Hematocrit 41.3 41.0 - 52.0 %    MCV 83 80 - 100 fL    MCH 30.2 26.0 - 34.0 pg    MCHC 36.3 (H) 32.0 - 36.0 g/dL    RDW 14.9 (H) 11.5 - 14.5 %    Platelets 365 150 - 450 x10*3/uL    Neutrophils % 64.6 40.0 - 80.0 %    Immature Granulocytes %, Automated 0.3 0.0 - 0.9 %    Lymphocytes % 24.3 13.0 - 44.0 %    Monocytes % 8.9 2.0 - 10.0 %    Eosinophils % 1.4 0.0 - 6.0 %    Basophils % 0.5 0.0 - 2.0 %    Neutrophils Absolute 6.45 1.20 - 7.70 x10*3/uL    Immature Granulocytes Absolute, Automated 0.03 0.00 - 0.70 x10*3/uL    Lymphocytes Absolute 2.43 1.20 - 4.80 x10*3/uL    Monocytes Absolute 0.89 0.10 - 1.00 x10*3/uL    Eosinophils Absolute 0.14 0.00 - 0.70 x10*3/uL    Basophils Absolute 0.05 0.00 - 0.10 x10*3/uL   Sars-CoV-2 PCR   Result Value Ref Range    Coronavirus 2019, PCR Not Detected Not Detected   Urinalysis with Reflex Culture and Microscopic   Result Value Ref Range    Color, Urine Yellow Light-Yellow, Yellow, Dark-Yellow    Appearance, Urine Clear Clear    Specific Gravity, Urine 1.019 1.005 - 1.035    pH, Urine 6.5 5.0, 5.5, 6.0, 6.5, 7.0, 7.5, 8.0    Protein, Urine 100 (2+) (A) NEGATIVE, 10 (TRACE), 20 (TRACE) mg/dL    Glucose, Urine Normal Normal mg/dL    Blood, Urine OVER (3+) (A) NEGATIVE    Ketones, Urine NEGATIVE NEGATIVE mg/dL    Bilirubin, Urine NEGATIVE NEGATIVE    Urobilinogen, Urine Normal Normal mg/dL    Nitrite, Urine NEGATIVE NEGATIVE    Leukocyte Esterase, Urine 25 Adin/µL (A) NEGATIVE   Microscopic Only, Urine   Result Value Ref Range    WBC, Urine 6-10 (A) 1-5, NONE /HPF    RBC, Urine NONE NONE, 1-2, 3-5 /HPF    Squamous Epithelial Cells, Urine 1-9  (SPARSE) Reference range not established. /HPF    Mucus, Urine FEW Reference range not established. /LPF   Basic metabolic panel   Result Value Ref Range    Glucose 127 (H) 65 - 99 mg/dL    Sodium 144 133 - 145 mmol/L    Potassium <1.0 (LL) 3.4 - 5.1 mmol/L    Chloride 103 97 - 107 mmol/L    Bicarbonate 28 24 - 31 mmol/L    Urea Nitrogen 11 8 - 25 mg/dL    Creatinine 0.90 0.40 - 1.60 mg/dL    eGFR >90 >60 mL/min/1.73m*2    Calcium 8.4 (L) 8.5 - 10.4 mg/dL    Anion Gap 13 <=19 mmol/L   Magnesium   Result Value Ref Range    Magnesium 1.80 1.60 - 3.10 mg/dL   Phosphorus   Result Value Ref Range    Phosphorus 1.5 (L) 2.5 - 4.5 mg/dL   Basic metabolic panel   Result Value Ref Range    Glucose 102 (H) 65 - 99 mg/dL    Sodium 145 133 - 145 mmol/L    Potassium <1.5 (LL) 3.4 - 5.1 mmol/L    Chloride 105 97 - 107 mmol/L    Bicarbonate 28 24 - 31 mmol/L    Urea Nitrogen 10 8 - 25 mg/dL    Creatinine 0.90 0.40 - 1.60 mg/dL    eGFR >90 >60 mL/min/1.73m*2    Calcium 8.3 (L) 8.5 - 10.4 mg/dL    Anion Gap 12 <=19 mmol/L   Creatine Kinase   Result Value Ref Range    Creatine Kinase 3,711 (H) 24 - 195 U/L   Urinalysis with Reflex Microscopic   Result Value Ref Range    Color, Urine Light-Yellow Light-Yellow, Yellow, Dark-Yellow    Appearance, Urine Clear Clear    Specific Gravity, Urine 1.032 1.005 - 1.035    pH, Urine 7.0 5.0, 5.5, 6.0, 6.5, 7.0, 7.5, 8.0    Protein, Urine 20 (TRACE) NEGATIVE, 10 (TRACE), 20 (TRACE) mg/dL    Glucose, Urine Normal Normal mg/dL    Blood, Urine 1.0 (3+) (A) NEGATIVE    Ketones, Urine NEGATIVE NEGATIVE mg/dL    Bilirubin, Urine NEGATIVE NEGATIVE    Urobilinogen, Urine Normal Normal mg/dL    Nitrite, Urine NEGATIVE NEGATIVE    Leukocyte Esterase, Urine NEGATIVE NEGATIVE   Microscopic Only, Urine   Result Value Ref Range    WBC, Urine 1-5 1-5, NONE /HPF    RBC, Urine NONE NONE, 1-2, 3-5 /HPF   NT-PROBNP   Result Value Ref Range    PROBNP     Serial Troponin, Initial ([facility])   Result Value Ref Range     Troponin T, High Sensitivity     Hemoglobin A1C   Result Value Ref Range    Hemoglobin A1C 5.6 See below %    Estimated Average Glucose 114 Not Established mg/dL   Type And Screen   Result Value Ref Range    ABO TYPE B     Rh TYPE POS     ANTIBODY SCREEN NEG    Protime-INR   Result Value Ref Range    Protime 11.2 9.3 - 12.7 seconds    INR 1.1 0.9 - 1.2   Blood Culture    Specimen: Peripheral Venipuncture; Blood culture   Result Value Ref Range    Blood Culture Loaded on Instrument - Culture in progress    Blood Culture    Specimen: Peripheral Venipuncture; Blood culture   Result Value Ref Range    Blood Culture Loaded on Instrument - Culture in progress    Abo/Rh Group Test   Result Value Ref Range    ABO TYPE B     Rh TYPE POS    Potassium, Urine Random   Result Value Ref Range    Potassium, Urine Random 13 mmol/L    Creatinine, Urine Random 50.1 NOT ESTABLISHED mg/dL    Potassium/Creatinine Ratio 26 Not established mmol/g Creat   Comprehensive metabolic panel   Result Value Ref Range    Glucose 165 (H) 65 - 99 mg/dL    Sodium 144 133 - 145 mmol/L    Potassium 1.6 (LL) 3.4 - 5.1 mmol/L    Chloride 107 97 - 107 mmol/L    Bicarbonate 26 24 - 31 mmol/L    Urea Nitrogen 9 8 - 25 mg/dL    Creatinine 0.80 0.40 - 1.60 mg/dL    eGFR >90 >60 mL/min/1.73m*2    Calcium 7.6 (L) 8.5 - 10.4 mg/dL    Albumin 2.9 (L) 3.5 - 5.0 g/dL    Alkaline Phosphatase 80 35 - 125 U/L    Total Protein 4.7 (L) 5.9 - 7.9 g/dL     (H) 5 - 40 U/L    Bilirubin, Total 0.5 0.1 - 1.2 mg/dL    ALT 62 (H) 5 - 40 U/L    Anion Gap 11 <=19 mmol/L   Magnesium   Result Value Ref Range    Magnesium 1.70 1.60 - 3.10 mg/dL   Phosphorus   Result Value Ref Range    Phosphorus 1.9 (L) 2.5 - 4.5 mg/dL   Serial Troponin, 2 Hour ([facility])   Result Value Ref Range    Troponin T, High Sensitivity 78 (HH) <=14 ng/L   PST Top   Result Value Ref Range    Extra Tube Hold for add-ons.    Basic Metabolic Panel   Result Value Ref Range    Glucose 94 65 - 99 mg/dL    Sodium  146 (H) 133 - 145 mmol/L    Potassium 1.6 (LL) 3.4 - 5.1 mmol/L    Chloride 109 (H) 97 - 107 mmol/L    Bicarbonate 25 24 - 31 mmol/L    Urea Nitrogen 10 8 - 25 mg/dL    Creatinine 0.80 0.40 - 1.60 mg/dL    eGFR >90 >60 mL/min/1.73m*2    Calcium 7.2 (L) 8.5 - 10.4 mg/dL    Anion Gap 12 <=19 mmol/L   Phosphorus   Result Value Ref Range    Phosphorus 1.7 (L) 2.5 - 4.5 mg/dL   Magnesium   Result Value Ref Range    Magnesium 1.50 (L) 1.60 - 3.10 mg/dL   Serial Troponin, 6 Hour ([facility])   Result Value Ref Range    Troponin T, High Sensitivity 75 (HH) <=14 ng/L   Phosphorus   Result Value Ref Range    Phosphorus 2.3 (L) 2.5 - 4.5 mg/dL   Basic Metabolic Panel   Result Value Ref Range    Glucose 108 (H) 65 - 99 mg/dL    Sodium 144 133 - 145 mmol/L    Potassium 1.8 (LL) 3.4 - 5.1 mmol/L    Chloride 107 97 - 107 mmol/L    Bicarbonate 26 24 - 31 mmol/L    Urea Nitrogen 10 8 - 25 mg/dL    Creatinine 0.80 0.40 - 1.60 mg/dL    eGFR >90 >60 mL/min/1.73m*2    Calcium 7.5 (L) 8.5 - 10.4 mg/dL    Anion Gap 11 <=19 mmol/L   Magnesium   Result Value Ref Range    Magnesium 1.90 1.60 - 3.10 mg/dL   Basic Metabolic Panel   Result Value Ref Range    Glucose 114 (H) 65 - 99 mg/dL    Sodium 145 133 - 145 mmol/L    Potassium 1.7 (LL) 3.4 - 5.1 mmol/L    Chloride 108 (H) 97 - 107 mmol/L    Bicarbonate 25 24 - 31 mmol/L    Urea Nitrogen 9 8 - 25 mg/dL    Creatinine 0.80 0.40 - 1.60 mg/dL    eGFR >90 >60 mL/min/1.73m*2    Calcium 7.2 (L) 8.5 - 10.4 mg/dL    Anion Gap 12 <=19 mmol/L   Phosphorus   Result Value Ref Range    Phosphorus 2.9 2.5 - 4.5 mg/dL   Magnesium   Result Value Ref Range    Magnesium 1.70 1.60 - 3.10 mg/dL   CBC   Result Value Ref Range    WBC 8.3 4.4 - 11.3 x10*3/uL    nRBC 0.0 0.0 - 0.0 /100 WBCs    RBC 3.69 (L) 4.50 - 5.90 x10*6/uL    Hemoglobin 11.1 (L) 13.5 - 17.5 g/dL    Hematocrit 30.7 (L) 41.0 - 52.0 %    MCV 83 80 - 100 fL    MCH 30.1 26.0 - 34.0 pg    MCHC 36.2 (H) 32.0 - 36.0 g/dL    RDW 15.2 (H) 11.5 - 14.5 %     Platelets 246 150 - 450 x10*3/uL   Comprehensive metabolic panel   Result Value Ref Range    Glucose 112 (H) 65 - 99 mg/dL    Sodium 143 133 - 145 mmol/L    Potassium 1.9 (LL) 3.4 - 5.1 mmol/L    Chloride 107 97 - 107 mmol/L    Bicarbonate 26 24 - 31 mmol/L    Urea Nitrogen 9 8 - 25 mg/dL    Creatinine 0.80 0.40 - 1.60 mg/dL    eGFR >90 >60 mL/min/1.73m*2    Calcium 7.7 (L) 8.5 - 10.4 mg/dL    Albumin 2.8 (L) 3.5 - 5.0 g/dL    Alkaline Phosphatase 78 35 - 125 U/L    Total Protein 5.1 (L) 5.9 - 7.9 g/dL     (H) 5 - 40 U/L    Bilirubin, Total 0.5 0.1 - 1.2 mg/dL    ALT 67 (H) 5 - 40 U/L    Anion Gap 10 <=19 mmol/L   Phosphorus   Result Value Ref Range    Phosphorus 3.1 2.5 - 4.5 mg/dL   Magnesium   Result Value Ref Range    Magnesium 1.90 1.60 - 3.10 mg/dL   Creatine Kinase   Result Value Ref Range    Creatine Kinase 5,486 (H) 24 - 195 U/L   Basic Metabolic Panel   Result Value Ref Range    Glucose 100 (H) 65 - 99 mg/dL    Sodium 147 (H) 133 - 145 mmol/L    Potassium 2.0 (LL) 3.4 - 5.1 mmol/L    Chloride 110 (H) 97 - 107 mmol/L    Bicarbonate 26 24 - 31 mmol/L    Urea Nitrogen 8 8 - 25 mg/dL    Creatinine 0.80 0.40 - 1.60 mg/dL    eGFR >90 >60 mL/min/1.73m*2    Calcium 7.7 (L) 8.5 - 10.4 mg/dL    Anion Gap 11 <=19 mmol/L   Basic Metabolic Panel   Result Value Ref Range    Glucose 117 (H) 65 - 99 mg/dL    Sodium 145 133 - 145 mmol/L    Potassium 2.3 (LL) 3.4 - 5.1 mmol/L    Chloride 110 (H) 97 - 107 mmol/L    Bicarbonate 22 (L) 24 - 31 mmol/L    Urea Nitrogen 7 (L) 8 - 25 mg/dL    Creatinine 0.80 0.40 - 1.60 mg/dL    eGFR >90 >60 mL/min/1.73m*2    Calcium 7.5 (L) 8.5 - 10.4 mg/dL    Anion Gap 13 <=19 mmol/L          Assessment/Plan     CT abdomen pelvis w IV contrast    Result Date: 9/10/2024  Interpreted By:  Maritza Garcia, STUDY: CT ABDOMEN PELVIS W IV CONTRAST;  9/10/2024 2:21 pm   INDICATION: Signs/Symptoms:groin pain.   COMPARISON: Lumbar spine dated 02/27/2017   ACCESSION NUMBER(S): [id number]   ORDERING  CLINICIAN: FIGUEROA EVANS   TECHNIQUE: CT of the abdomen and pelvis was performed following the intravenous administration 75 mL Omnipaque 350. Sagittal and coronal reconstructions are generated.   FINDINGS: LOWER CHEST: There appear to be faint coronary artery calcifications.   ABDOMEN:   LIVER: Unremarkable   BILE DUCTS: There is no evidence of biliary dilatation.   GALLBLADDER: There are no obvious gallstones.   PANCREAS: Unremarkable   SPLEEN: Unremarkable   ADRENAL GLANDS: There are no adrenal masses.   KIDNEYS AND URETERS: The kidneys are not obstructed. There is a tiny non-obstructing right renal calculus.   The ureters are normal in caliber.   PELVIS:   BLADDER: Unremarkable   REPRODUCTIVE ORGANS: The prostate is visualized.   BOWEL: There is no significant bowel distention. Appendix is not obvious. There is a question of slight wall thickening of the cecum. There is also some thickening of the wall of the descending colon. There is sigmoid diverticulosis.   VESSELS: There are atherosclerotic changes of the aorta. The cava is unremarkable.   PERITONEUM/RETROPERITONEUM/LYMPH NODES: There is no free air or free fluid.   There are numerous enlarged right-sided mesenteric lymph nodes.   BONES AND ABDOMINAL WALL: Patient is status is pinning of lumbar spine. There is a fracture of L2. It is heterogeneous.   COMPARISON OF FINDINGS: The lumbar spine fracture was present previously       Question of thickening of the wall of the cecum add distal descending colon..   Numerous slightly prominent nodes in the right lower quadrant.   MACRO: none   Signed by: Maritza Garcia 9/10/2024 3:05 PM Dictation workstation:   [id number]    CT head wo IV contrast    Result Date: 9/10/2024  Interpreted By:  Maritza Garcia, STUDY: CT HEAD WO IV CONTRAST; ;  9/10/2024 2:21 pm   INDICATION: Signs/Symptoms:R sided droop w/ slurred speech.   COMPARISON: 01/13/2017   ACCESSION NUMBER(S): [id number]   ORDERING CLINICIAN: FIGUEROA EVANS   TECHNIQUE:  Serial axial images of the head were obtained without intravenous contrast. Sagittal and coronal reconstructions were generated.   FINDINGS: The ventricles are midline and normal in size.   There are no acute parenchymal abnormalities.   There is no hemorrhage or extra-axial fluid.   There is no obvious scalp hematoma or skull fracture.   The visualized portions of the paranasal sinuses and mastoids are unremarkable.   COMPARISON OF FINDINGS: The brain is similar.       No acute intracranial abnormality.     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 and 20 IV of potassium chloride check potassium every 4 hours  IV hydration with isotonic normal saline  Check serial CPK levels  Correct phosphorus  Correct magnesium  Once the potassium is normal we will obtain aldosterone level  Plasma renin activity  Urine potassium    Thank you very much for consultation  Ward Garcia MDInpatient consult to Nephrology  Consult performed by: Ward Garcia MD  Consult ordered by: Ayde Lorenzo MD

## 2024-09-11 NOTE — PROCEDURES
Vascular Access Team Procedure Note     Visit Date: 9/11/2024      Patient Name: Bereket Schaefer         MRN: [id number]             Procedure: 5FR dual lumen picc line inserted to R brachial vein, brisk blood return noted to both lumens and they both flush easily with NS, curos caps applied, pt tolerated well. Length 43cm, arm circumference 28cm, external length 1cm.                           Greta Griffith RN  9/11/2024  10:15 AM

## 2024-09-11 NOTE — PROGRESS NOTES
09/11/24 1604   Discharge Planning   Living Arrangements Spouse/significant other   Support Systems Spouse/significant other   Assistance Needed None   Type of Residence Private residence   Number of Stairs to Enter Residence 4   Number of Stairs Within Residence 16   Do you have animals or pets at home? Yes   Type of Animals or Pets Dogs   Home or Post Acute Services None   Expected Discharge Disposition Home   Does the patient need discharge transport arranged? No

## 2024-09-11 NOTE — CONSULTS
Inpatient consult to gastroenterology  Consult performed by: Marie Young, SURYA-CNP  Consult ordered by: Ayde Lorenzo MD          Reason For Consult  Abnormal CT    History Of Present Illness  Bereket Schaefer is a 48 y.o. male with a past medical history of hypertension who presents with generalized weakness. Patient notes generalized weakness has been worsening the last several days. Patient denies any significant fevers, chills, nausea, vomiting. Reports normal bowel movements. No diarrhea/constipation. ER work up noted no significant leukocytosis or anemia on CBC, severe hypokalemia of less than 1, CK 3711. CT abd/pelvis showed  no significant bowel distention. There is a question of slight wall thickening of the cecum. There is also some thickening of the wall of the descending colon. No prior colon.     Past Medical History  He has a past medical history of Elevated urine levels of drugs, medicaments and biological substances (12/28/2015), Hypertension, Other psychoactive substance use, unspecified with withdrawal, unspecified (Multi) (05/22/2019), Other specified abnormal findings of blood chemistry (03/08/2016), Personal history of other diseases of the circulatory system (01/13/2017), Personal history of other diseases of the nervous system and sense organs (01/13/2017), Personal history of other diseases of the nervous system and sense organs (06/08/2016), Wedge compression fracture of first lumbar vertebra, initial encounter for closed fracture (Multi), Wedge compression fracture of second lumbar vertebra, initial encounter for closed fracture (Multi), and Wedge compression fracture of second lumbar vertebra, initial encounter for closed fracture (Multi).    Surgical History  He has a past surgical history that includes Lumbar fusion (10/25/2013); Vasectomy (10/25/2013); and Back surgery.     Social History  He reports that he has been smoking cigarettes. He has a 30 pack-year smoking history.  He has been exposed to tobacco smoke. He has never used smokeless tobacco. He reports that he does not currently use alcohol. He reports that he does not use drugs.    Family History  No family history on file.     Allergies  Aspirin    Review of Systems   Constitutional: Negative.    HENT: Negative.     Eyes: Negative.    Respiratory: Negative.     Cardiovascular: Negative.    Gastrointestinal: Negative.    Endocrine: Negative.    Genitourinary: Negative.    Musculoskeletal: Negative.    Allergic/Immunologic: Negative.    Neurological: Negative.    Hematological: Negative.    Psychiatric/Behavioral: Negative.          Physical Exam  HENT:      Head: Normocephalic.      Nose: Nose normal.      Mouth/Throat:      Mouth: Mucous membranes are moist.   Eyes:      Pupils: Pupils are equal, round, and reactive to light.   Cardiovascular:      Rate and Rhythm: Normal rate.   Pulmonary:      Breath sounds: Normal breath sounds.   Abdominal:      Palpations: Abdomen is soft.   Musculoskeletal:         General: Normal range of motion.      Cervical back: Normal range of motion.   Skin:     General: Skin is warm.   Neurological:      General: No focal deficit present.      Mental Status: He is alert.   Psychiatric:         Mood and Affect: Mood normal.          Last Recorded Vitals  Blood pressure 100/54, pulse 66, temperature 36.9 °C (98.4 °F), temperature source Temporal, resp. rate 18, height 1.829 m (6'), weight 73.5 kg (162 lb 0.6 oz), SpO2 95%.    Relevant Results  CT abdomen pelvis w IV contrast    Result Date: 9/10/2024  Interpreted By:  Maritza Garcia, STUDY: CT ABDOMEN PELVIS W IV CONTRAST;  9/10/2024 2:21 pm   INDICATION: Signs/Symptoms:groin pain.   COMPARISON: Lumbar spine dated 02/27/2017   ACCESSION NUMBER(S): [id number]   ORDERING CLINICIAN: FIGUEROA EVANS   TECHNIQUE: CT of the abdomen and pelvis was performed following the intravenous administration 75 mL Omnipaque 350. Sagittal and coronal reconstructions are  generated.   FINDINGS: LOWER CHEST: There appear to be faint coronary artery calcifications.   ABDOMEN:   LIVER: Unremarkable   BILE DUCTS: There is no evidence of biliary dilatation.   GALLBLADDER: There are no obvious gallstones.   PANCREAS: Unremarkable   SPLEEN: Unremarkable   ADRENAL GLANDS: There are no adrenal masses.   KIDNEYS AND URETERS: The kidneys are not obstructed. There is a tiny non-obstructing right renal calculus.   The ureters are normal in caliber.   PELVIS:   BLADDER: Unremarkable   REPRODUCTIVE ORGANS: The prostate is visualized.   BOWEL: There is no significant bowel distention. Appendix is not obvious. There is a question of slight wall thickening of the cecum. There is also some thickening of the wall of the descending colon. There is sigmoid diverticulosis.   VESSELS: There are atherosclerotic changes of the aorta. The cava is unremarkable.   PERITONEUM/RETROPERITONEUM/LYMPH NODES: There is no free air or free fluid.   There are numerous enlarged right-sided mesenteric lymph nodes.   BONES AND ABDOMINAL WALL: Patient is status is pinning of lumbar spine. There is a fracture of L2. It is heterogeneous.   COMPARISON OF FINDINGS: The lumbar spine fracture was present previously       Question of thickening of the wall of the cecum add distal descending colon..   Numerous slightly prominent nodes in the right lower quadrant.   MACRO: none   Signed by: Maritza Garcia 9/10/2024 3:05 PM Dictation workstation:   [id number]    CT head wo IV contrast    Result Date: 9/10/2024  Interpreted By:  Maritza Garcia, STUDY: CT HEAD WO IV CONTRAST; ;  9/10/2024 2:21 pm   INDICATION: Signs/Symptoms:R sided droop w/ slurred speech.   COMPARISON: 01/13/2017   ACCESSION NUMBER(S): [id number]   ORDERING CLINICIAN: FIGUEROA EVANS   TECHNIQUE: Serial axial images of the head were obtained without intravenous contrast. Sagittal and coronal reconstructions were generated.   FINDINGS: The ventricles are midline and normal in  size.   There are no acute parenchymal abnormalities.   There is no hemorrhage or extra-axial fluid.   There is no obvious scalp hematoma or skull fracture.   The visualized portions of the paranasal sinuses and mastoids are unremarkable.   COMPARISON OF FINDINGS: The brain is similar.       No acute intracranial abnormality.     MACRO: none   Signed by: Maritza Garcia 9/10/2024 2:56 PM Dictation workstation:   [id number]      Scheduled medications  amLODIPine, 10 mg, oral, Daily  enoxaparin, 40 mg, subcutaneous, q24h  escitalopram, 10 mg, oral, Daily  magnesium sulfate, 1 g, intravenous, Once  nicotine, 1 patch, transdermal, Daily  [START ON 9/11/2024] pantoprazole, 40 mg, oral, Daily before breakfast   Or  [START ON 9/11/2024] pantoprazole, 40 mg, intravenous, Daily before breakfast  piperacillin-tazobactam, 3.375 g, intravenous, q6h  potassium phosphate, 21 mmol, intravenous, Once      Continuous medications  sodium chloride 0.9%, 100 mL/hr, Last Rate: 100 mL/hr (09/10/24 1643)      PRN medications  PRN medications: acetaminophen **OR** acetaminophen **OR** acetaminophen, oxyCODONE  Results for orders placed or performed during the hospital encounter of 09/10/24 (from the past 24 hour(s))   CBC and Auto Differential   Result Value Ref Range    WBC 10.0 4.4 - 11.3 x10*3/uL    nRBC 0.0 0.0 - 0.0 /100 WBCs    RBC 4.96 4.50 - 5.90 x10*6/uL    Hemoglobin 15.0 13.5 - 17.5 g/dL    Hematocrit 41.3 41.0 - 52.0 %    MCV 83 80 - 100 fL    MCH 30.2 26.0 - 34.0 pg    MCHC 36.3 (H) 32.0 - 36.0 g/dL    RDW 14.9 (H) 11.5 - 14.5 %    Platelets 365 150 - 450 x10*3/uL    Neutrophils % 64.6 40.0 - 80.0 %    Immature Granulocytes %, Automated 0.3 0.0 - 0.9 %    Lymphocytes % 24.3 13.0 - 44.0 %    Monocytes % 8.9 2.0 - 10.0 %    Eosinophils % 1.4 0.0 - 6.0 %    Basophils % 0.5 0.0 - 2.0 %    Neutrophils Absolute 6.45 1.20 - 7.70 x10*3/uL    Immature Granulocytes Absolute, Automated 0.03 0.00 - 0.70 x10*3/uL    Lymphocytes Absolute 2.43  1.20 - 4.80 x10*3/uL    Monocytes Absolute 0.89 0.10 - 1.00 x10*3/uL    Eosinophils Absolute 0.14 0.00 - 0.70 x10*3/uL    Basophils Absolute 0.05 0.00 - 0.10 x10*3/uL   Sars-CoV-2 PCR   Result Value Ref Range    Coronavirus 2019, PCR Not Detected Not Detected   Urinalysis with Reflex Culture and Microscopic   Result Value Ref Range    Color, Urine Yellow Light-Yellow, Yellow, Dark-Yellow    Appearance, Urine Clear Clear    Specific Gravity, Urine 1.019 1.005 - 1.035    pH, Urine 6.5 5.0, 5.5, 6.0, 6.5, 7.0, 7.5, 8.0    Protein, Urine 100 (2+) (A) NEGATIVE, 10 (TRACE), 20 (TRACE) mg/dL    Glucose, Urine Normal Normal mg/dL    Blood, Urine OVER (3+) (A) NEGATIVE    Ketones, Urine NEGATIVE NEGATIVE mg/dL    Bilirubin, Urine NEGATIVE NEGATIVE    Urobilinogen, Urine Normal Normal mg/dL    Nitrite, Urine NEGATIVE NEGATIVE    Leukocyte Esterase, Urine 25 Adin/µL (A) NEGATIVE   Microscopic Only, Urine   Result Value Ref Range    WBC, Urine 6-10 (A) 1-5, NONE /HPF    RBC, Urine NONE NONE, 1-2, 3-5 /HPF    Squamous Epithelial Cells, Urine 1-9 (SPARSE) Reference range not established. /HPF    Mucus, Urine FEW Reference range not established. /LPF   Basic metabolic panel   Result Value Ref Range    Glucose 127 (H) 65 - 99 mg/dL    Sodium 144 133 - 145 mmol/L    Potassium <1.0 (LL) 3.4 - 5.1 mmol/L    Chloride 103 97 - 107 mmol/L    Bicarbonate 28 24 - 31 mmol/L    Urea Nitrogen 11 8 - 25 mg/dL    Creatinine 0.90 0.40 - 1.60 mg/dL    eGFR >90 >60 mL/min/1.73m*2    Calcium 8.4 (L) 8.5 - 10.4 mg/dL    Anion Gap 13 <=19 mmol/L   Magnesium   Result Value Ref Range    Magnesium 1.80 1.60 - 3.10 mg/dL   Phosphorus   Result Value Ref Range    Phosphorus 1.5 (L) 2.5 - 4.5 mg/dL   Basic metabolic panel   Result Value Ref Range    Glucose 102 (H) 65 - 99 mg/dL    Sodium 145 133 - 145 mmol/L    Potassium <1.5 (LL) 3.4 - 5.1 mmol/L    Chloride 105 97 - 107 mmol/L    Bicarbonate 28 24 - 31 mmol/L    Urea Nitrogen 10 8 - 25 mg/dL    Creatinine  0.90 0.40 - 1.60 mg/dL    eGFR >90 >60 mL/min/1.73m*2    Calcium 8.3 (L) 8.5 - 10.4 mg/dL    Anion Gap 12 <=19 mmol/L   Creatine Kinase   Result Value Ref Range    Creatine Kinase 3,711 (H) 24 - 195 U/L   Urinalysis with Reflex Microscopic   Result Value Ref Range    Color, Urine Light-Yellow Light-Yellow, Yellow, Dark-Yellow    Appearance, Urine Clear Clear    Specific Gravity, Urine 1.032 1.005 - 1.035    pH, Urine 7.0 5.0, 5.5, 6.0, 6.5, 7.0, 7.5, 8.0    Protein, Urine 20 (TRACE) NEGATIVE, 10 (TRACE), 20 (TRACE) mg/dL    Glucose, Urine Normal Normal mg/dL    Blood, Urine 1.0 (3+) (A) NEGATIVE    Ketones, Urine NEGATIVE NEGATIVE mg/dL    Bilirubin, Urine NEGATIVE NEGATIVE    Urobilinogen, Urine Normal Normal mg/dL    Nitrite, Urine NEGATIVE NEGATIVE    Leukocyte Esterase, Urine NEGATIVE NEGATIVE   Microscopic Only, Urine   Result Value Ref Range    WBC, Urine 1-5 1-5, NONE /HPF    RBC, Urine NONE NONE, 1-2, 3-5 /HPF   NT-PROBNP   Result Value Ref Range    PROBNP     Serial Troponin, Initial ([facility])   Result Value Ref Range    Troponin T, High Sensitivity     Hemoglobin A1C   Result Value Ref Range    Hemoglobin A1C 5.6 See below %    Estimated Average Glucose 114 Not Established mg/dL   Type And Screen   Result Value Ref Range    ABO TYPE B     Rh TYPE POS     ANTIBODY SCREEN NEG    Protime-INR   Result Value Ref Range    Protime 11.2 9.3 - 12.7 seconds    INR 1.1 0.9 - 1.2   Abo/Rh Group Test   Result Value Ref Range    ABO TYPE B     Rh TYPE POS    Potassium, Urine Random   Result Value Ref Range    Potassium, Urine Random 13 mmol/L    Creatinine, Urine Random 49.8 NOT ESTABLISHED mg/dL    Potassium/Creatinine Ratio 26 Not established mmol/g Creat   Comprehensive metabolic panel   Result Value Ref Range    Glucose 165 (H) 65 - 99 mg/dL    Sodium 144 133 - 145 mmol/L    Potassium 1.6 (LL) 3.4 - 5.1 mmol/L    Chloride 107 97 - 107 mmol/L    Bicarbonate 26 24 - 31 mmol/L    Urea Nitrogen 9 8 - 25 mg/dL    Creatinine  0.80 0.40 - 1.60 mg/dL    eGFR >90 >60 mL/min/1.73m*2    Calcium 7.6 (L) 8.5 - 10.4 mg/dL    Albumin 2.9 (L) 3.5 - 5.0 g/dL    Alkaline Phosphatase 80 35 - 125 U/L    Total Protein 4.7 (L) 5.9 - 7.9 g/dL     (H) 5 - 40 U/L    Bilirubin, Total 0.5 0.1 - 1.2 mg/dL    ALT 62 (H) 5 - 40 U/L    Anion Gap 11 <=19 mmol/L   Magnesium   Result Value Ref Range    Magnesium 1.70 1.60 - 3.10 mg/dL   Phosphorus   Result Value Ref Range    Phosphorus 1.9 (L) 2.5 - 4.5 mg/dL   Serial Troponin, 2 Hour ([facility])   Result Value Ref Range    Troponin T, High Sensitivity 78 (HH) <=14 ng/L        Assessment/Plan   Abnormal CT  CT abd/pelvis showed slight wall thickening of the cecum. There is also some thickening of the wall of the descending colon. Possible r/t severe hypokalemia.  Recommend iv hydration, correcting electrolytes including potassium, magnesium and phosphorus, eliminating all colonic hypomotility meds like opiods, benzos, anti-cholinergics, anti-histamines  out of bed, and increased mobility. Ultimately will should have colonoscopy at some point to r/o underlying neoplastic, infectious, or inflammatory conditions which can be done outpatient.  Nephrology following for possible primary hypoaldosteronism which can occur with hypertension and severe hypokalemia.   Continue supportive care.     Marie Young, APRN-CNP

## 2024-09-11 NOTE — PROGRESS NOTES
Music Therapy Note    Bereket Schaefer    Therapy Session  Referral Type: Pain rounds  Visit Type: New visit  Session Start Time: 1304  Intervention Delivery: In-person  Conflict of Service: Working with other staff               Treatment/Interventions            Narrative  Follow-up: MT will follow-up if Pt remains admitted.    Education Documentation  No documentation found.

## 2024-09-11 NOTE — PROGRESS NOTES
Bereket Schaefer is a 48 y.o. male on day 1 of admission presenting with Hypokalemia.      Subjective   He does have significant pain in the shoulders thighs still sitting up to have breakfast.  No diarrhea no vomiting no abdominal pain per se       Objective     Last Recorded Vitals  /63 (BP Location: Left arm, Patient Position: Lying)   Pulse 71   Temp 36.5 °C (97.7 °F) (Temporal)   Resp 16   Wt 73.5 kg (162 lb 0.6 oz)   SpO2 95%   Intake/Output last 3 Shifts:    Intake/Output Summary (Last 24 hours) at 9/11/2024 0829  Last data filed at 9/11/2024 0755  Gross per 24 hour   Intake 2237.08 ml   Output 1045 ml   Net 1192.08 ml       Physical Exam  Alert oriented x 3 cooperative no distress wife is at bedside  Normocephalic/atraumatic EOMI PERRLA  Chest clear  Heart regular  Abdomen soft nontender  Extremities no edema positive pain over major muscle groups  Neurologic exam gross nonfocal  Relevant Results  Lab results reviewed with nephrology  Assessment/Plan     Principal Problem:    Hypokalemia      September 11         Profound hypokalemia  Profound weakness  Rhabdomyolysis  Chronic pain  Hypertension    Will continue potassium replacement as advised by nephrology this was directly discussed with them.  Will order surveillance labs.  In view of the frequent blood draws and need for continuous intravenous potassium will order PICC line.  Thickening of colon discussed with GI service empiric Zosyn is ordered for that not convinced he needs it.  Troponin elevation likely related to muscle problem doubt highly acute heart problems but will defer to cardiology evaluation    Ayde Lorenzo MD

## 2024-09-11 NOTE — CONSULTS
Consults  History Of Present Illness:    Bereket Schaefer is a 48 y.o. male presenting with generalized weakness hypokalemia elevated CK.    The patient is a 48-year-old white male with a relatively benign past medical history other than for hypertension for which she has been treated with amlodipine and lisinopril for a period of approximately 5 years.  The patient's wife notes that at 1 point he was noted to have a slightly low potassium of 3.1 but has never been on a diuretic or require potassium replacement.  The patient does have some ongoing going back pain for which she had been given baclofen.  The patient had evidently been prescribed atorvastatin 20 mg daily in the past for hyperlipidemia.  Patient has also been a chronic smoker.    Patient is employed as a cement truck  and over the past several days had been experiencing some profound generalized weakness to the point where he had difficulty even lifting his legs.  He also had some generalized myalgia ultimately he was brought to the [facility] emergency room.  The patient's initial evaluation included a basic panel which included a potassium of less than 1.0 repeated at less than 1.5.  Serum sodium was 144 creatinine 0.90.  The patient has been seen by nephrology and has been started on aggressive potassium supplementation both intravenously and orally.  The most recent potassium is 2.30.  The patient's telemetry monitor demonstrates sinus rhythm and the EKG shows sinus rhythm with a very slight IVCD.  In speaking with the patient and his wife the patient was experiencing profound muscle weakness to the point that he had difficulty lifting his legs.  His initial salt CK was 3711 repeated at 5486.  The patient has had a head CT performed showing no acute abnormality.  Abdominal and pelvic CT scan showed some possible thickening of the cecal wall and descending colon with numerous slightly prominent nodes in the right lower quadrant.  No  "mention of any adrenal mass.     Last Recorded Vitals:  Vitals:    09/10/24 2300 09/11/24 0400 09/11/24 0725 09/11/24 1131   BP: 116/75  103/63 116/72   BP Location: Left arm  Left arm Left arm   Patient Position: Lying  Lying Lying   Pulse: 70  71 71   Resp: 19 20 16    Temp: 36.3 °C (97.3 °F) 36.2 °C (97.2 °F) 36.5 °C (97.7 °F) 36.9 °C (98.4 °F)   TempSrc: Temporal Temporal Temporal Temporal   SpO2: 95%  95% 96%   Weight:       Height:           Last Labs:  CBC - 9/11/2024:  3:54 AM  8.3 11.1 246    30.7      CMP - 9/11/2024: 10:12 AM  7.3 5.1 150 --- 0.5   3.1 2.8 67 78      PTT - No results in last year.  1.1   11.2 _     Hemoglobin A1C   Date/Time Value Ref Range Status   09/10/2024 05:17 PM 5.6 See below % Final     VLDL   Date/Time Value Ref Range Status   11/08/2018 05:37 AM 37 0 - 40 mg/dL Final      Last I/O:  I/O last 3 completed shifts:  In: 2037.1 (27.7 mL/kg) [P.O.:720; I.V.:1317.1 (17.9 mL/kg)]  Out: 1045 (14.2 mL/kg) [Urine:1045 (0.4 mL/kg/hr)]  Weight: 73.5 kg     Past Cardiology Tests (Last 3 Years):  EKG:  No results found for this or any previous visit from the past 1095 days.    Echo:  No results found for this or any previous visit from the past 1095 days.    Ejection Fractions:  No results found for: \"EF\"  Cath:  No results found for this or any previous visit from the past 1095 days.    Stress Test:  No results found for this or any previous visit from the past 1095 days.    Cardiac Imaging:  No results found for this or any previous visit from the past 1095 days.      Past Medical History:  He has a past medical history of Elevated urine levels of drugs, medicaments and biological substances (12/28/2015), Hypertension, Other psychoactive substance use, unspecified with withdrawal, unspecified (Multi) (05/22/2019), Other specified abnormal findings of blood chemistry (03/08/2016), Personal history of other diseases of the circulatory system (01/13/2017), Personal history of other diseases of " the nervous system and sense organs (01/13/2017), Personal history of other diseases of the nervous system and sense organs (06/08/2016), Wedge compression fracture of first lumbar vertebra, initial encounter for closed fracture (Multi), Wedge compression fracture of second lumbar vertebra, initial encounter for closed fracture (Multi), and Wedge compression fracture of second lumbar vertebra, initial encounter for closed fracture (Multi).    Past Surgical History:  He has a past surgical history that includes Lumbar fusion (10/25/2013); Vasectomy (10/25/2013); and Back surgery.      Social History:  He reports that he has been smoking cigarettes. He has a 30 pack-year smoking history. He has been exposed to tobacco smoke. He has never used smokeless tobacco. He reports that he does not currently use alcohol. He reports that he does not use drugs.    Family History:  No family history on file.     Allergies:  Aspirin    Inpatient Medications:  Scheduled medications   Medication Dose Route Frequency    [Held by provider] amLODIPine  10 mg oral Daily    enoxaparin  40 mg subcutaneous q24h    escitalopram  10 mg oral Daily    lidocaine  5 mL infiltration Once    nicotine  1 patch transdermal Daily    pantoprazole  40 mg oral Daily before breakfast    Or    pantoprazole  40 mg intravenous Daily before breakfast    piperacillin-tazobactam  3.375 g intravenous q6h     PRN medications   Medication    acetaminophen    Or    acetaminophen    Or    acetaminophen    alteplase    oxyCODONE     Continuous Medications   Medication Dose Last Rate    sodium chloride 0.9%  125 mL/hr 125 mL/hr (09/11/24 0917)     Outpatient Medications:  Current Outpatient Medications   Medication Instructions    amLODIPine (NORVASC) 10 mg, oral, Daily    atorvastatin (LIPITOR) 20 mg, oral, Daily    baclofen (LIORESAL) 10 mg, oral, 3 times daily PRN    escitalopram (LEXAPRO) 10 mg, oral, Daily    HYDROcodone-acetaminophen (Norco)  mg tablet 1  tablet, oral, Every 4 hours PRN    [START ON 9/13/2024] HYDROcodone-acetaminophen (Norco)  mg tablet 1 tablet, oral, Every 4 hours PRN    [START ON 10/13/2024] HYDROcodone-acetaminophen (Norco)  mg tablet 1 tablet, oral, Every 4 hours PRN    ibuprofen 600 mg, oral, Every 8 hours PRN    lisinopril 10 mg, oral, Daily, as directed    naloxone (NARCAN) 4 mg, nasal, As needed, May repeat every 2-3 minutes if needed, alternating nostrils, until medical assistance becomes available.    naproxen (NAPROSYN) 500 mg, oral, 2 times daily (morning and late afternoon)    nicotine (Nicoderm CQ) 14 mg/24 hr patch 1 patch, transdermal, Every 24 hours    nicotine (Nicoderm CQ) 21 mg/24 hr patch 1 patch, transdermal, Every 24 hours    tadalafil (CIALIS) 5 mg, oral, Daily       Physical Exam:  The patient is a fairly well-appearing bearded middle-aged white male lying in bed visiting with wife.  He is awake alert conversant not short of breath no overt painful distress  JVP not elevated carotid impulses 2+  Chest fair air movement breath sounds are clear  The cardiac rhythm is regular no premature beats S1-S2 normal no gallop murmur rub  Abdomen soft and benign  No peripheral edema pedal pulses are present.     Assessment/Plan   9/11: The patient is a 48-year-old white male whose past history includes hypertension treated with amlodipine lisinopril no diuretic, hyperlipidemia managed with atorvastatin 20 mg daily, muscular low back pain for which he been on baclofen.  Patient admitted with extreme generalized muscle weakness and profound hypokalemia with a potassium of less than 1.0 along with elevated CPK.  Etiology of the CK elevation at this point not related to any memorable muscular trauma.  Myositis related to adverse reaction to medication being considered atorvastatin and baclofen have been discontinued.  Patient also conceivably could have polymyositis or is a related connective tissue disorder and will check CRP  sedimentation rate and FRENCH plus and aldolase.  The hypokalemia is of uncertain etiology the possibility of hyperaldosteronism is being evaluated with serum aldosterone renin levels pending.  No adrenal mass on the abdominal CT.  Fortunately no arrhythmias related to the hypokalemia will continue telemetry monitor check echocardiogram.    Peripheral IV 09/10/24 20 G Right;Anterior Forearm (Active)   Site Assessment Clean;Intact;Dry 09/11/24 0831   Dressing Status Clean;Dry;Occlusive 09/11/24 0831   Number of days: 1       Code Status:  Full Code    I spent 30 minutes in the professional and overall care of this patient.        Joe Padilla MD

## 2024-09-11 NOTE — PROGRESS NOTES
Physical Therapy                 Therapy Communication Note    Patient Name: Bereket Schaefer  MRN: [id number]  Department: [facility]  Room: 410/[facility]  Today's Date: 9/11/2024     Discipline: Physical Therapy    Missed Visit Reason: Missed Visit Reason: Other (Comment) (abnormal labs)    Missed Time: Cancel    Comment: PT deferred as potassium levels remain < 2.0

## 2024-09-11 NOTE — CARE PLAN
Problem: Pain  Goal: Takes deep breaths with improved pain control throughout the shift  Outcome: Progressing  Goal: Turns in bed with improved pain control throughout the shift  Outcome: Progressing  Goal: Walks with improved pain control throughout the shift  Outcome: Progressing  Goal: Performs ADL's with improved pain control throughout shift  Outcome: Progressing  Goal: Participates in PT with improved pain control throughout the shift  Outcome: Progressing  Goal: Free from opioid side effects throughout the shift  Outcome: Progressing  Goal: Free from acute confusion related to pain meds throughout the shift  Outcome: Progressing     Problem: Pain - Adult  Goal: Verbalizes/displays adequate comfort level or baseline comfort level  Outcome: Met     Problem: Safety - Adult  Goal: Free from fall injury  Outcome: Met     Problem: Discharge Planning  Goal: Discharge to home or other facility with appropriate resources  Outcome: Progressing     Problem: Chronic Conditions and Co-morbidities  Goal: Patient's chronic conditions and co-morbidity symptoms are monitored and maintained or improved  Outcome: Progressing   The patient's goals for the shift include get my potassium up    The clinical goals for the shift include balance electrolytes    Over the shift, the patient did not make progress toward the following goals. Barriers to progression include hypokalemia. Recommendations to address these barriers include continuation of care.

## 2024-09-11 NOTE — CONSULTS
Consults  Consult performed by: SURYA Hall-CNP  Consult ordered by: Ayde Lorenzo MD     Reason For Consult  Abdominal pain, cecum thickening     Location [facility]     History Of Present Illness  Bereket Schaefer is a 48 y.o. male on day 1 of admission presenting with Hypokalemia.     Patient presented with chief complaint of profound weakness.  His wife at bedside says that he was very confused and lethargic and not able to move.  She thought he was having a stroke so she brought him here.  All of the above happened Saturday afternoon.  Prior to admission patient denies any angina, shortness of breath, cough, sputum, diarrhea, dysuria, abdominal pain, nausea, or vomiting.  He was admitted with found hypokalemia, profound weakness, rhabdomyolysis, chronic pain, and hypertension.  We were asked to see the patient as above for abdominal pain and cecum thickening.  Patient and wife at bedside deny the patient having any abdominal pain recently, prior to admission, since admission, or now.  Denies ever having hypokalemia before other than a few years ago but states it was only very mild at that time.  Denies any history of kidney disease. His potassium when he came in on 9/10 was less than 1.     Works as a  and has been doing that for about the past 4 months and says that he has been jumping on and off the truck a lot.  Says that he has been having bilateral groin pain since this past Saturday.  Does not have any known bilateral inguinal hernias that he is aware of.  Says that the bilateral groin pain was sudden, does not have if he lays still but only if he moves and worse when he lifts his leg straight up that he describes as sharp and burning, no radiation, upon ER presentation was a 7 and is currently a 0.  Never had these groin pains before.  Has not done any heavy lifting outside of his normal daily heavy lifting that he does for work.  Denies any trauma to his groins.   Denies any pain going down his legs or scrotum.     Denies having any nausea or vomiting.  Passing gas.  Has been eating normally except for a little less prior to admission and here.  Last bowel movement was last night and normal without blood.  He has been moving his bowels normally.  He has never had a colonoscopy before.  Denies ever being diagnosed with inflammatory bowel disease-Crohn's/ulcerative colitis or ever having diverticulitis.     Denies taking any anticoagulants or prescription antiplatelet medications prior to admission.  Is on Lovenox here.  Is currently on a regular diet.  See his below past medical history.  Denies any past abdominal or inguinal surgeries.     9/10 CT abdomen pelvis with IV contrast as per below demonstrated question of thickening of the wall of the cecum and distal descending colon and numerous slightly prominent nodes in the right lower quadrant.    Past Medical History  Chronic back pain-on Vicodin  Current cigarette smoker-probable COPD  Elevated urine levels of drugs  Hyperlipidemia  Hypertension  Lumbar wedge compression fracture  Other psychoactive substance use     Surgical History  Lumbar fusion 2013  Pain block to his back and under general anesthesia approximately 6 years ago  Vasectomy 2013  Denies any other surgeries or abdominal surgeries or any groin surgeries  No surgeries listed under chart review surgery tab     Social History  From home with his wife  Works as a   Current smoker for over 30 years of 1 to 1-1/2 packs a day  No alcohol or drugs     Family History  Mother-benign colon polyps removed multiple times  No family history of colon cancer     Allergies  RX Allergies        Allergies   Allergen Reactions    Aspirin Hives         Review of Systems  1) Anesthetic complications: No known personal or family history of anesthetic complications  2) General: No significant unintentional weight gain, fevers, chills, fatigue, appetite loss.   Says that he has lost about 12 pounds over the past month due to eating less due to his job because they are supposed to get a break to eat but they do not and he says normally eats just meal a day.  Weakness as per HPI  3) HEENT: Negative.  4) Cardiac: No angina or leg edema.  5) Pulmonary: No asthma, wheezing, sob.  6) GI: No abd pain, n/v, appetite loss, changes in bowel habits, etc.  7) Hematologic: No known personal or family history of bleeding diathesis.  8) Endocrine: No diabetes or thyroid disorders.  9) : No dysuria, hematuria, or frequency.  10) Musculoskeletal: Weakness as per HPI  11) Neuro: No history of seizures or strokes.  12) Psych: No anxiety or depression    Physical Exam  1) VS-noted as documented.  2) General-laying in bed in no acute distress. Not septic appearing. Pleasant and cooperative. Looks fine and comfortable.   3) Neuro-Awake, alert, oriented to person, place, and time. Speech is normal. Affect is normal. Follows commands appropriately.  4) HEENT-Head normocephalic and externally atraumatic. Conjunctiva pink. Sclera anicteric. MMM.  5) Heart-RRR.  Sinus rhythm on the monitor with a rate of 69  Blood pressure on the monitor 105/89 with a MAP of 95-not on any pressors  6) Lungs-Clear room air-97%. Speaks in complete sentences and does not have any accessory muscle use.  7) Extremities-No edema. The patient's extremities are warm and normal color.  8) Skin-Warm and dry. No diaphoresis or jaundice.  9) Psych-Normal mood. Appropriate affect.   10) Abdomen-Soft. Positive bowel sounds. Non distended.  Nontender.    No guarding/rebound   No abdominal pain when I shake his abdomen a little bit  No obvious hernias that I appreciate    11) -no bilateral inguinal hernias that I appreciate      Wife at bedside    Seen again with Dr Robledo at 1303     Vital signs in last 24 hours:  Temp:  [36.2 °C (97.2 °F)-36.9 °C (98.4 °F)] 36.9 °C (98.4 °F)  Heart Rate:  [60-74] 71  Resp:  [12-22] 16  BP:  (100-143)/(54-89) 116/72  Heart Rate:  [60-74]   Temp:  [36.2 °C (97.2 °F)-36.9 °C (98.4 °F)]   Resp:  [12-22]   BP: (100-143)/(54-89)   SpO2:  [91 %-98 %]       Intake/Output last 3 Shifts:  I/O last 3 completed shifts:  In: 2037.1 (27.7 mL/kg) [P.O.:720; I.V.:1317.1 (17.9 mL/kg)]  Out: 1045 (14.2 mL/kg) [Urine:1045 (0.4 mL/kg/hr)]  Weight: 73.5 kg      Scheduled medications    Scheduled Medications   [Held by provider] amLODIPine, 10 mg, oral, Daily  enoxaparin, 40 mg, subcutaneous, q24h  escitalopram, 10 mg, oral, Daily  lidocaine, 5 mL, infiltration, Once  nicotine, 1 patch, transdermal, Daily  pantoprazole, 40 mg, oral, Daily before breakfast   Or  pantoprazole, 40 mg, intravenous, Daily before breakfast  piperacillin-tazobactam, 3.375 g, intravenous, q6h         Continuous medications    Continuous Medications   sodium chloride 0.9%, 125 mL/hr, Last Rate: 125 mL/hr (09/11/24 1217)         PRN medications  PRN Medications   PRN medications: acetaminophen **OR** acetaminophen **OR** acetaminophen, alteplase, oxyCODONE        Relevant Results        Results from last 7 days   Lab Units 09/11/24  0354 09/10/24  1204   WBC AUTO x10*3/uL 8.3 10.0   HEMOGLOBIN g/dL 11.1* 15.0   HEMATOCRIT % 30.7* 41.3   PLATELETS AUTO x10*3/uL 246 365             Results from last 7 days   Lab Units 09/11/24  1012 09/11/24  0806 09/11/24  0615   SODIUM mmol/L 146* 145 147*   POTASSIUM mmol/L 1.9* 2.3* 2.0*   CHLORIDE mmol/L 111* 110* 110*   CO2 mmol/L 24 22* 26   BUN mg/dL 7* 7* 8   CREATININE mg/dL 0.80 0.80 0.80   GLUCOSE mg/dL 128* 117* 100*   CALCIUM mg/dL 7.3* 7.5* 7.7*     CT abdomen pelvis w IV contrast     Result Date: 9/10/2024  Interpreted By:  Maritza Garcia, STUDY: CT ABDOMEN PELVIS W IV CONTRAST;  9/10/2024 2:21 pm   INDICATION: Signs/Symptoms:groin pain.   COMPARISON: Lumbar spine dated 02/27/2017   ACCESSION NUMBER(S): [id number]   ORDERING CLINICIAN: FIGUEROA EVANS   TECHNIQUE: CT of the abdomen and pelvis was performed  following the intravenous administration 75 mL Omnipaque 350. Sagittal and coronal reconstructions are generated.   FINDINGS: LOWER CHEST: There appear to be faint coronary artery calcifications.   ABDOMEN:   LIVER: Unremarkable   BILE DUCTS: There is no evidence of biliary dilatation.   GALLBLADDER: There are no obvious gallstones.   PANCREAS: Unremarkable   SPLEEN: Unremarkable   ADRENAL GLANDS: There are no adrenal masses.   KIDNEYS AND URETERS: The kidneys are not obstructed. There is a tiny non-obstructing right renal calculus.   The ureters are normal in caliber.   PELVIS:   BLADDER: Unremarkable   REPRODUCTIVE ORGANS: The prostate is visualized.   BOWEL: There is no significant bowel distention. Appendix is not obvious. There is a question of slight wall thickening of the cecum. There is also some thickening of the wall of the descending colon. There is sigmoid diverticulosis.   VESSELS: There are atherosclerotic changes of the aorta. The cava is unremarkable.   PERITONEUM/RETROPERITONEUM/LYMPH NODES: There is no free air or free fluid.   There are numerous enlarged right-sided mesenteric lymph nodes.   BONES AND ABDOMINAL WALL: Patient is status is pinning of lumbar spine. There is a fracture of L2. It is heterogeneous.   COMPARISON OF FINDINGS: The lumbar spine fracture was present previously        Question of thickening of the wall of the cecum add distal descending colon..   Numerous slightly prominent nodes in the right lower quadrant.   MACRO: none   Signed by: Maritza Garcia 9/10/2024 3:05 PM Dictation workstation:   [id number]     CT head wo IV contrast     Result Date: 9/10/2024  Interpreted By:  Maritza Garcia, STUDY: CT HEAD WO IV CONTRAST; ;  9/10/2024 2:21 pm   INDICATION: Signs/Symptoms:R sided droop w/ slurred speech.   COMPARISON: 01/13/2017   ACCESSION NUMBER(S): [id number]   ORDERING CLINICIAN: FIGUEROA EVANS   TECHNIQUE: Serial axial images of the head were obtained without intravenous contrast.  "Sagittal and coronal reconstructions were generated.   FINDINGS: The ventricles are midline and normal in size.   There are no acute parenchymal abnormalities.   There is no hemorrhage or extra-axial fluid.   There is no obvious scalp hematoma or skull fracture.   The visualized portions of the paranasal sinuses and mastoids are unremarkable.   COMPARISON OF FINDINGS: The brain is similar.        No acute intracranial abnormality.     MACRO: none   Signed by: Maritza Garcia 9/10/2024 2:56 PM Dictation workstation:   [id number]    Assessment/Plan  Principal Problem:  Hypokalemia  Less than 1 upon admit  1.9 today  No known cause yet-being worked up     Question of thickening of the wall of the cecum and distal descending colon and numerous slightly prominent nodes of the right lower quadrant on 9/11 CT abdomen pelvis with IV contrast  VSS  On Zosyn empirically for \"abdominal infection\"  On regular diet  Cardiology consult noted  Nephrology consult noted  GI consult noted:  \"Recommend iv hydration, correcting electrolytes including potassium, magnesium and phosphorus, eliminating all colonic hypomotility meds like opiods, benzos, anti-cholinergics, anti-histamines  out of bed, and increased mobility. Ultimately will should have colonoscopy at some point to r/o underlying neoplastic, infectious, or inflammatory conditions which can be done outpatient.  Nephrology following for possible primary hypoaldosteronism which can occur with hypertension and severe hypokalemia.   Continue supportive care.\"  Would recommend colonoscopy per GI to evaluate abnormal CT findings to see if there is a surgical issue that they identify-however there are no acute surgical indications at present  Will check [facility]  Thank you for this consult  Will sign off     DVT prophylaxis  Per IM  On Lovenox    GI source of hypokalemia is less likely without diarrhea. Recommend follow-up colonoscopy to evaluate cecum. No general surgery issues. Follow-up " prn. Will see patient in future upon request.

## 2024-09-11 NOTE — CONSULTS
ConsultsReason For Consult  Abdominal pain, cecum thickening     Location [facility]     History Of Present Illness  Bereket Schaefer is a 48 y.o. male on day 1 of admission presenting with Hypokalemia.    Patient presented with chief complaint of profound weakness.  His wife at bedside says that he was very confused and lethargic and not able to move.  She thought he would be having a stroke so she brought him here.  All of the above happened Saturday afternoon.  Prior to admission patient denies any angina, shortness of breath, cough, sputum, diarrhea, dysuria, abdominal pain, nausea, or vomiting.  He was admitted with found hypokalemia, profound weakness, rhabdomyolysis, chronic pain, and hypertension.  We were asked to see the patient as above for abdominal pain and cecum thickening.  Patient and wife at bedside deny the patient having any abdominal pain recently, prior to admission, since admission, or now.  Denies ever having hypokalemia before other than a few years ago but it was only very mild at that time.  Denies any history of kidney disease.  His potassium when he came in on 9/10 was less than 1.    Works as a  and has been doing that for about the past 4 months and says that he has been jumping on and off the truck a lot.  Says that he has been having bilateral groin pain since this past Saturday.  Does not have any known bilateral inguinal hernias that he is aware of.  Says that these were sudden, does not have if he lays still but only if he moves and worse when he lifts his leg straight up that he describes as sharp and burning, no radiation, upon ER presentation was a 7 and is currently a 0.  Never had these groin pains before.  Has not done any heavy lifting outside of his normal daily heavy lifting that he does for work.  Denies any trauma to his groins.    Is having any nausea or vomiting.  Passing gas.  Has been eating normally except for a little less prior to admission and  "here.  Last bowel movement was last night and normal without blood.  He has been moving his bowels normally.  He has never had a colonoscopy before.  Denies ever being diagnosed with inflammatory bowel disease-Crohn's/\"ulcerative colitis or ever having diverticulitis.    Denies taking any anticoagulants or prescription antiplatelet medications prior to admission.  Is on Lovenox here.  Is currently on a regular diet.    9/10 CT abdomen pelvis with IV contrast as per below demonstrated question of thickening of the wall of the cecum and distal descending colon and numerous slightly prominent nodes in the right lower quadrant.      Past Medical History  Chronic back pain-on Vicodin  Current cigarette smoker-probable COPD  Elevated urine levels of drugs  Hyperlipidemia  Hypertension  Other psychoactive substance use  Lumbar wedge compression fracture      Surgical History  Lumbar fusion 2013  Pain block to his back and under general anesthesia approximately 6 years ago  Vasectomy 2013  Denies any other surgeries or abdominal surgeries or any groin surgeries  No surgeries listed under chart review surgery tab    Social History  From home with his wife  Works as a   Current smoker for over 30 years of 1 to 1-1/2 packs a day  No alcohol or drugs     Family History  Other-benign colon polyps removed multiple times  No family history of colon cancer       Allergies  Allergies   Allergen Reactions    Aspirin Hives        Review of Systems  1) Anesthetic complications: No known personal or family history of anesthetic complications  2) General: No significant unintentional weight gain, fevers, chills, fatigue, appetite loss.  Says that he has lost about 12 pounds over the past month due to eating last week due to his job because eating that they are supposed to get a break to eat but they do not and he says normally eats just meal a day.  Weakness as per HPI  3) HEENT: Negative.  4) Cardiac: No angina or " leg edema.  5) Pulmonary: No asthma, wheezing, sob.  6) GI: No abd pain, n/v, appetite loss, changes in bowel habits, etc.  7) Hematologic: No known personal or family history of bleeding diathesis.  8) Endocrine: No diabetes or thyroid disorders.  9) : No dysuria, hematuria, or frequency.  10) Musculoskeletal: Weakness as per HPI  11) Neuro: No history of seizures or strokes.  12) Psych: No anxiety or depression        Physical Exam  1) VS-noted as documented.  2) General-laying in bed in no acute distress. Not septic appearing. Pleasant and cooperative. Looks fine and comfortable.   3) Neuro-Awake, alert, oriented to person, place, and time. Speech is normal. Affect is normal. Follows commands appropriately.  4) HEENT-Head normocephalic and externally atraumatic. Conjunctiva pink. Sclera anicteric. MMM.  5) Heart-RRR.  [place] rhythm on the monitor with a rate of 69  Blood pressure on the monitor 105/89 with a MAP of 95-not on any pressors  6) Lungs-Clear room air-97%. Speaks in complete sentences and does not have any accessory muscle use.  7) Extremities-No edema. The patient's extremities are warm and normal color.  8) Skin-Warm and dry. No diaphoresis or jaundice.  9) Psych-Normal mood. Appropriate affect.   10) Abdomen-Soft. Positive bowel sounds. Non distended.  Nontender.  No guarding/rebound/obvious hernias that I appreciate and no abdominal pain when I shake his abdomen a little bit  No obvious hernias  Obese  11) -no bilateral inguinal hernias that I appreciate all over the patient's bilateral inguinal areas are little tender    Wife at bedside    Vital signs in last 24 hours:  Temp:  [36.2 °C (97.2 °F)-36.9 °C (98.4 °F)] 36.9 °C (98.4 °F)  Heart Rate:  [60-74] 71  Resp:  [12-22] 16  BP: (100-143)/(54-89) 116/72  Heart Rate:  [60-74]   Temp:  [36.2 °C (97.2 °F)-36.9 °C (98.4 °F)]   Resp:  [12-22]   BP: (100-143)/(54-89)   SpO2:  [91 %-98 %]      Intake/Output last 3 Shifts:  I/O last 3 completed  shifts:  In: 2037.1 (27.7 mL/kg) [P.O.:720; I.V.:1317.1 (17.9 mL/kg)]  Out: 1045 (14.2 mL/kg) [Urine:1045 (0.4 mL/kg/hr)]  Weight: 73.5 kg     Scheduled medications  [Held by provider] amLODIPine, 10 mg, oral, Daily  enoxaparin, 40 mg, subcutaneous, q24h  escitalopram, 10 mg, oral, Daily  lidocaine, 5 mL, infiltration, Once  nicotine, 1 patch, transdermal, Daily  pantoprazole, 40 mg, oral, Daily before breakfast   Or  pantoprazole, 40 mg, intravenous, Daily before breakfast  piperacillin-tazobactam, 3.375 g, intravenous, q6h      Continuous medications  sodium chloride 0.9%, 125 mL/hr, Last Rate: 125 mL/hr (09/11/24 1217)      PRN medications  PRN medications: acetaminophen **OR** acetaminophen **OR** acetaminophen, alteplase, oxyCODONE    Relevant Results  Results from last 7 days   Lab Units 09/11/24  0354 09/10/24  1204   WBC AUTO x10*3/uL 8.3 10.0   HEMOGLOBIN g/dL 11.1* 15.0   HEMATOCRIT % 30.7* 41.3   PLATELETS AUTO x10*3/uL 246 365      Results from last 7 days   Lab Units 09/11/24  1012 09/11/24  0806 09/11/24  0615   SODIUM mmol/L 146* 145 147*   POTASSIUM mmol/L 1.9* 2.3* 2.0*   CHLORIDE mmol/L 111* 110* 110*   CO2 mmol/L 24 22* 26   BUN mg/dL 7* 7* 8   CREATININE mg/dL 0.80 0.80 0.80   GLUCOSE mg/dL 128* 117* 100*   CALCIUM mg/dL 7.3* 7.5* 7.7*          No results found for the last 7 days.    CT abdomen pelvis w IV contrast    Result Date: 9/10/2024  Interpreted By:  Maritza Garcia, STUDY: CT ABDOMEN PELVIS W IV CONTRAST;  9/10/2024 2:21 pm   INDICATION: Signs/Symptoms:groin pain.   COMPARISON: Lumbar spine dated 02/27/2017   ACCESSION NUMBER(S): [id number]   ORDERING CLINICIAN: FIGUEROA EVANS   TECHNIQUE: CT of the abdomen and pelvis was performed following the intravenous administration 75 mL Omnipaque 350. Sagittal and coronal reconstructions are generated.   FINDINGS: LOWER CHEST: There appear to be faint coronary artery calcifications.   ABDOMEN:   LIVER: Unremarkable   BILE DUCTS: There is no evidence  of biliary dilatation.   GALLBLADDER: There are no obvious gallstones.   PANCREAS: Unremarkable   SPLEEN: Unremarkable   ADRENAL GLANDS: There are no adrenal masses.   KIDNEYS AND URETERS: The kidneys are not obstructed. There is a tiny non-obstructing right renal calculus.   The ureters are normal in caliber.   PELVIS:   BLADDER: Unremarkable   REPRODUCTIVE ORGANS: The prostate is visualized.   BOWEL: There is no significant bowel distention. Appendix is not obvious. There is a question of slight wall thickening of the cecum. There is also some thickening of the wall of the descending colon. There is sigmoid diverticulosis.   VESSELS: There are atherosclerotic changes of the aorta. The cava is unremarkable.   PERITONEUM/RETROPERITONEUM/LYMPH NODES: There is no free air or free fluid.   There are numerous enlarged right-sided mesenteric lymph nodes.   BONES AND ABDOMINAL WALL: Patient is status is pinning of lumbar spine. There is a fracture of L2. It is heterogeneous.   COMPARISON OF FINDINGS: The lumbar spine fracture was present previously       Question of thickening of the wall of the cecum add distal descending colon..   Numerous slightly prominent nodes in the right lower quadrant.   MACRO: none   Signed by: Maritza Garcia 9/10/2024 3:05 PM Dictation workstation:   [id number]    CT head wo IV contrast    Result Date: 9/10/2024  Interpreted By:  Maritza Garcia, STUDY: CT HEAD WO IV CONTRAST; ;  9/10/2024 2:21 pm   INDICATION: Signs/Symptoms:R sided droop w/ slurred speech.   COMPARISON: 01/13/2017   ACCESSION NUMBER(S): [id number]   ORDERING CLINICIAN: FIGUEROA EVANS   TECHNIQUE: Serial axial images of the head were obtained without intravenous contrast. Sagittal and coronal reconstructions were generated.   FINDINGS: The ventricles are midline and normal in size.   There are no acute parenchymal abnormalities.   There is no hemorrhage or extra-axial fluid.   There is no obvious scalp hematoma or skull fracture.    "The visualized portions of the paranasal sinuses and mastoids are unremarkable.   COMPARISON OF FINDINGS: The brain is similar.       No acute intracranial abnormality.     MACRO: none   Signed by: Maritza Garcia 9/10/2024 2:56 PM Dictation workstation:   [id number]         Assessment/Plan   Principal Problem:    Hypokalemia  Less than 1 upon admit  1.9 today    Patient of thickening of the wall of the cecum and distal descending colon and numerous slightly prominent nodes of the right lower quadrant on 9/11 CT abdomen pelvis with IV contrast  VSS  On Zosyn empirically for \"abdominal infection\"  On regular diet  Cardiology consult noted  Nephrology consult noted  GI consult noted:  \"Recommend iv hydration, correcting electrolytes including potassium, magnesium and phosphorus, eliminating all colonic hypomotility meds like opiods, benzos, anti-cholinergics, anti-histamines  out of bed, and increased mobility. Ultimately will should have colonoscopy at some point to r/o underlying neoplastic, infectious, or inflammatory conditions which can be done outpatient.  Nephrology following for possible primary hypoaldosteronism which can occur with hypertension and severe hypokalemia.   Continue supportive care.\"  Would recommend colonoscopy per GI to evaluate abnormal CT findings to see if there is a surgical issue then however there are no acute surgical indications at present  Thank you for this consult    DVT prophylaxis  Per IM  On Lovenox    SURYA Hall-RIKI           "

## 2024-09-12 ENCOUNTER — APPOINTMENT (OUTPATIENT)
Dept: RADIOLOGY | Facility: HOSPITAL | Age: 49
End: 2024-09-12

## 2024-09-12 LAB
ALBUMIN SERPL-MCNC: 2.8 G/DL (ref 3.5–5)
ALBUMIN SERPL-MCNC: 3 G/DL (ref 3.5–5)
ALP BLD-CCNC: 72 U/L (ref 35–125)
ALT SERPL-CCNC: 87 U/L (ref 5–40)
ANA SER QL HEP2 SUBST: NEGATIVE
ANION GAP BLDA CALCULATED.4IONS-SCNC: 11 MMO/L (ref 10–25)
ANION GAP SERPL CALC-SCNC: 10 MMOL/L
ANION GAP SERPL CALC-SCNC: 11 MMOL/L
ANION GAP SERPL CALC-SCNC: 11 MMOL/L
ARTERIAL PATENCY WRIST A: POSITIVE
AST SERPL-CCNC: 261 U/L (ref 5–40)
BACTERIA UR CULT: NO GROWTH
BASE EXCESS BLDA CALC-SCNC: -0.6 MMOL/L (ref -2–3)
BASOPHILS # BLD AUTO: 0.04 X10*3/UL (ref 0–0.1)
BASOPHILS NFR BLD AUTO: 0.5 %
BILIRUB SERPL-MCNC: 0.5 MG/DL (ref 0.1–1.2)
BODY TEMPERATURE: 37 DEGREES CELSIUS
BUN SERPL-MCNC: 3 MG/DL (ref 8–25)
BUN SERPL-MCNC: 4 MG/DL (ref 8–25)
BUN SERPL-MCNC: <3 MG/DL (ref 8–25)
BUN SERPL-MCNC: <3 MG/DL (ref 8–25)
CA-I BLD-SCNC: 1.07 MMOL/L (ref 1.1–1.33)
CA-I BLDA-SCNC: 1.1 MMOL/L (ref 1.1–1.33)
CALCIUM SERPL-MCNC: 7.3 MG/DL (ref 8.5–10.4)
CALCIUM SERPL-MCNC: 7.4 MG/DL (ref 8.5–10.4)
CALCIUM SERPL-MCNC: 7.4 MG/DL (ref 8.5–10.4)
CALCIUM SERPL-MCNC: 7.5 MG/DL (ref 8.5–10.4)
CALCIUM SERPL-MCNC: 7.7 MG/DL (ref 8.5–10.4)
CALCIUM SERPL-MCNC: 7.9 MG/DL (ref 8.5–10.4)
CEA SERPL-MCNC: 1.3 UG/L
CHLORIDE BLDA-SCNC: 113 MMOL/L (ref 98–107)
CHLORIDE SERPL-SCNC: 109 MMOL/L (ref 97–107)
CHLORIDE SERPL-SCNC: 111 MMOL/L (ref 97–107)
CHLORIDE SERPL-SCNC: 112 MMOL/L (ref 97–107)
CHLORIDE SERPL-SCNC: 113 MMOL/L (ref 97–107)
CK SERPL-CCNC: ABNORMAL U/L (ref 24–195)
CO2 SERPL-SCNC: 24 MMOL/L (ref 24–31)
CO2 SERPL-SCNC: 25 MMOL/L (ref 24–31)
CREAT SERPL-MCNC: 0.7 MG/DL (ref 0.4–1.6)
CREAT SERPL-MCNC: 0.8 MG/DL (ref 0.4–1.6)
CRITICAL CALL TIME: 1100
CRITICAL CALLED BY: ABNORMAL
CRITICAL CALLED TO: ABNORMAL
CRITICAL NOTE: ABNORMAL
CRITICAL READ BACK: ABNORMAL
EGFRCR SERPLBLD CKD-EPI 2021: >90 ML/MIN/1.73M*2
EOSINOPHIL # BLD AUTO: 0.16 X10*3/UL (ref 0–0.7)
EOSINOPHIL NFR BLD AUTO: 2.1 %
ERYTHROCYTE [DISTWIDTH] IN BLOOD BY AUTOMATED COUNT: 15.7 % (ref 11.5–14.5)
ERYTHROCYTE [SEDIMENTATION RATE] IN BLOOD BY WESTERGREN METHOD: 6 MM/H (ref 0–15)
GLUCOSE BLDA-MCNC: 132 MG/DL (ref 74–99)
GLUCOSE SERPL-MCNC: 102 MG/DL (ref 65–99)
GLUCOSE SERPL-MCNC: 102 MG/DL (ref 65–99)
GLUCOSE SERPL-MCNC: 128 MG/DL (ref 65–99)
GLUCOSE SERPL-MCNC: 161 MG/DL (ref 65–99)
GLUCOSE SERPL-MCNC: 75 MG/DL (ref 65–99)
GLUCOSE SERPL-MCNC: 95 MG/DL (ref 65–99)
HAV IGM SER QL: NONREACTIVE
HBV CORE IGM SER QL: NONREACTIVE
HBV SURFACE AG SERPL QL IA: NONREACTIVE
HCO3 BLDA-SCNC: 22.9 MMOL/L (ref 22–26)
HCT VFR BLD AUTO: 30.2 % (ref 41–52)
HCT VFR BLD EST: 34 % (ref 41–52)
HCV AB SER QL: NONREACTIVE
HGB BLD-MCNC: 10.6 G/DL (ref 13.5–17.5)
HGB BLDA-MCNC: 11.3 G/DL (ref 13.5–17.5)
IMM GRANULOCYTES # BLD AUTO: 0.02 X10*3/UL (ref 0–0.7)
IMM GRANULOCYTES NFR BLD AUTO: 0.3 % (ref 0–0.9)
INHALED O2 CONCENTRATION: 21 %
LACTATE BLDA-SCNC: 1.7 MMOL/L (ref 0.4–2)
LYMPHOCYTES # BLD AUTO: 2.54 X10*3/UL (ref 1.2–4.8)
LYMPHOCYTES NFR BLD AUTO: 33.3 %
MAGNESIUM SERPL-MCNC: 1.6 MG/DL (ref 1.6–3.1)
MAGNESIUM SERPL-MCNC: 2.1 MG/DL (ref 1.6–3.1)
MCH RBC QN AUTO: 30.1 PG (ref 26–34)
MCHC RBC AUTO-ENTMCNC: 35.1 G/DL (ref 32–36)
MCV RBC AUTO: 86 FL (ref 80–100)
MONOCYTES # BLD AUTO: 0.53 X10*3/UL (ref 0.1–1)
MONOCYTES NFR BLD AUTO: 6.9 %
NEUTROPHILS # BLD AUTO: 4.34 X10*3/UL (ref 1.2–7.7)
NEUTROPHILS NFR BLD AUTO: 56.9 %
NRBC BLD-RTO: 0 /100 WBCS (ref 0–0)
OXYHGB MFR BLDA: 92.3 % (ref 94–98)
PCO2 BLDA: 33 MM HG (ref 38–42)
PH BLDA: 7.45 PH (ref 7.38–7.42)
PHOSPHATE SERPL-MCNC: 2.6 MG/DL (ref 2.5–4.5)
PHOSPHATE SERPL-MCNC: 2.6 MG/DL (ref 2.5–4.5)
PLATELET # BLD AUTO: 237 X10*3/UL (ref 150–450)
PO2 BLDA: ABNORMAL MM[HG]
POTASSIUM BLDA-SCNC: 2.8 MMOL/L (ref 3.5–5.3)
POTASSIUM SERPL-SCNC: 2.3 MMOL/L (ref 3.4–5.1)
POTASSIUM SERPL-SCNC: 2.4 MMOL/L (ref 3.4–5.1)
POTASSIUM SERPL-SCNC: 2.4 MMOL/L (ref 3.4–5.1)
POTASSIUM SERPL-SCNC: 2.6 MMOL/L (ref 3.4–5.1)
POTASSIUM SERPL-SCNC: 2.8 MMOL/L (ref 3.4–5.1)
POTASSIUM SERPL-SCNC: 2.8 MMOL/L (ref 3.4–5.1)
POTASSIUM SERPL-SCNC: 3.1 MMOL/L (ref 3.4–5.1)
PROT SERPL-MCNC: 4.5 G/DL (ref 5.9–7.9)
PTH-INTACT SERPL-MCNC: 159.9 PG/ML (ref 18.5–88)
RBC # BLD AUTO: 3.52 X10*6/UL (ref 4.5–5.9)
SAO2 % BLDA: 95 % (ref 94–100)
SODIUM BLDA-SCNC: 144 MMOL/L (ref 136–145)
SODIUM SERPL-SCNC: 144 MMOL/L (ref 133–145)
SODIUM SERPL-SCNC: 145 MMOL/L (ref 133–145)
SODIUM SERPL-SCNC: 146 MMOL/L (ref 133–145)
SODIUM SERPL-SCNC: 146 MMOL/L (ref 133–145)
SODIUM SERPL-SCNC: 147 MMOL/L (ref 133–145)
SODIUM SERPL-SCNC: 148 MMOL/L (ref 133–145)
SPECIMEN DRAWN FROM PATIENT: ABNORMAL
WBC # BLD AUTO: 7.6 X10*3/UL (ref 4.4–11.3)

## 2024-09-12 PROCEDURE — S4991 NICOTINE PATCH NONLEGEND: HCPCS | Performed by: INTERNAL MEDICINE

## 2024-09-12 PROCEDURE — 84075 ASSAY ALKALINE PHOSPHATASE: CPT | Performed by: INTERNAL MEDICINE

## 2024-09-12 PROCEDURE — 82378 CARCINOEMBRYONIC ANTIGEN: CPT | Mod: TRILAB,WESLAB | Performed by: NURSE PRACTITIONER

## 2024-09-12 PROCEDURE — 36600 WITHDRAWAL OF ARTERIAL BLOOD: CPT

## 2024-09-12 PROCEDURE — 84132 ASSAY OF SERUM POTASSIUM: CPT | Performed by: INTERNAL MEDICINE

## 2024-09-12 PROCEDURE — 83970 ASSAY OF PARATHORMONE: CPT | Mod: TRILAB,WESLAB | Performed by: INTERNAL MEDICINE

## 2024-09-12 PROCEDURE — A9537 TC99M MEBROFENIN: HCPCS | Performed by: NURSE PRACTITIONER

## 2024-09-12 PROCEDURE — 76705 ECHO EXAM OF ABDOMEN: CPT | Performed by: RADIOLOGY

## 2024-09-12 PROCEDURE — 83516 IMMUNOASSAY NONANTIBODY: CPT | Performed by: INTERNAL MEDICINE

## 2024-09-12 PROCEDURE — 80048 BASIC METABOLIC PNL TOTAL CA: CPT | Mod: CCI | Performed by: INTERNAL MEDICINE

## 2024-09-12 PROCEDURE — 82330 ASSAY OF CALCIUM: CPT | Performed by: INTERNAL MEDICINE

## 2024-09-12 PROCEDURE — 85025 COMPLETE CBC W/AUTO DIFF WBC: CPT | Performed by: INTERNAL MEDICINE

## 2024-09-12 PROCEDURE — 83735 ASSAY OF MAGNESIUM: CPT | Performed by: INTERNAL MEDICINE

## 2024-09-12 PROCEDURE — 82088 ASSAY OF ALDOSTERONE: CPT | Performed by: INTERNAL MEDICINE

## 2024-09-12 PROCEDURE — 2500000004 HC RX 250 GENERAL PHARMACY W/ HCPCS (ALT 636 FOR OP/ED): Performed by: INTERNAL MEDICINE

## 2024-09-12 PROCEDURE — 99232 SBSQ HOSP IP/OBS MODERATE 35: CPT | Performed by: INTERNAL MEDICINE

## 2024-09-12 PROCEDURE — 82374 ASSAY BLOOD CARBON DIOXIDE: CPT | Performed by: INTERNAL MEDICINE

## 2024-09-12 PROCEDURE — 2500000001 HC RX 250 WO HCPCS SELF ADMINISTERED DRUGS (ALT 637 FOR MEDICARE OP): Performed by: INTERNAL MEDICINE

## 2024-09-12 PROCEDURE — 78227 HEPATOBIL SYST IMAGE W/DRUG: CPT

## 2024-09-12 PROCEDURE — 9420000001 HC RT PATIENT EDUCATION 5 MIN

## 2024-09-12 PROCEDURE — 84244 ASSAY OF RENIN: CPT | Performed by: INTERNAL MEDICINE

## 2024-09-12 PROCEDURE — 2580000001 HC RX 258 IV SOLUTIONS: Performed by: STUDENT IN AN ORGANIZED HEALTH CARE EDUCATION/TRAINING PROGRAM

## 2024-09-12 PROCEDURE — 2500000004 HC RX 250 GENERAL PHARMACY W/ HCPCS (ALT 636 FOR OP/ED): Performed by: REGISTERED NURSE

## 2024-09-12 PROCEDURE — 84100 ASSAY OF PHOSPHORUS: CPT | Performed by: INTERNAL MEDICINE

## 2024-09-12 PROCEDURE — 76705 ECHO EXAM OF ABDOMEN: CPT

## 2024-09-12 PROCEDURE — 86705 HEP B CORE ANTIBODY IGM: CPT | Mod: TRILAB,WESLAB | Performed by: NURSE PRACTITIONER

## 2024-09-12 PROCEDURE — 85652 RBC SED RATE AUTOMATED: CPT | Performed by: INTERNAL MEDICINE

## 2024-09-12 PROCEDURE — 2500000004 HC RX 250 GENERAL PHARMACY W/ HCPCS (ALT 636 FOR OP/ED): Performed by: STUDENT IN AN ORGANIZED HEALTH CARE EDUCATION/TRAINING PROGRAM

## 2024-09-12 PROCEDURE — 3430000001 HC RX 343 DIAGNOSTIC RADIOPHARMACEUTICALS: Performed by: NURSE PRACTITIONER

## 2024-09-12 PROCEDURE — 2500000004 HC RX 250 GENERAL PHARMACY W/ HCPCS (ALT 636 FOR OP/ED): Performed by: NURSE PRACTITIONER

## 2024-09-12 PROCEDURE — 82550 ASSAY OF CK (CPK): CPT | Performed by: INTERNAL MEDICINE

## 2024-09-12 PROCEDURE — 2500000001 HC RX 250 WO HCPCS SELF ADMINISTERED DRUGS (ALT 637 FOR MEDICARE OP): Performed by: REGISTERED NURSE

## 2024-09-12 PROCEDURE — 99221 1ST HOSP IP/OBS SF/LOW 40: CPT | Performed by: SURGERY

## 2024-09-12 PROCEDURE — 78227 HEPATOBIL SYST IMAGE W/DRUG: CPT | Performed by: RADIOLOGY

## 2024-09-12 PROCEDURE — 2020000001 HC ICU ROOM DAILY

## 2024-09-12 PROCEDURE — 2500000002 HC RX 250 W HCPCS SELF ADMINISTERED DRUGS (ALT 637 FOR MEDICARE OP, ALT 636 FOR OP/ED): Performed by: INTERNAL MEDICINE

## 2024-09-12 RX ORDER — KIT FOR THE PREPARATION OF TECHNETIUM TC 99M MEBROFENIN 45 MG/10ML
5.1 INJECTION, POWDER, LYOPHILIZED, FOR SOLUTION INTRAVENOUS
Status: COMPLETED | OUTPATIENT
Start: 2024-09-12 | End: 2024-09-12

## 2024-09-12 RX ORDER — HYDROMORPHONE HYDROCHLORIDE 1 MG/ML
1 INJECTION, SOLUTION INTRAMUSCULAR; INTRAVENOUS; SUBCUTANEOUS EVERY 4 HOURS PRN
Status: DISCONTINUED | OUTPATIENT
Start: 2024-09-12 | End: 2024-09-16 | Stop reason: HOSPADM

## 2024-09-12 RX ORDER — POTASSIUM CHLORIDE 1.5 G/1.58G
40 POWDER, FOR SOLUTION ORAL ONCE
Status: DISCONTINUED | OUTPATIENT
Start: 2024-09-12 | End: 2024-09-13

## 2024-09-12 RX ORDER — MAGNESIUM SULFATE HEPTAHYDRATE 40 MG/ML
2 INJECTION, SOLUTION INTRAVENOUS ONCE
Status: COMPLETED | OUTPATIENT
Start: 2024-09-12 | End: 2024-09-12

## 2024-09-12 RX ORDER — CALCIUM GLUCONATE 20 MG/ML
1 INJECTION, SOLUTION INTRAVENOUS EVERY 4 HOURS
Status: COMPLETED | OUTPATIENT
Start: 2024-09-12 | End: 2024-09-12

## 2024-09-12 RX ORDER — POTASSIUM CHLORIDE 29.8 MG/ML
40 INJECTION INTRAVENOUS ONCE
Status: COMPLETED | OUTPATIENT
Start: 2024-09-12 | End: 2024-09-12

## 2024-09-12 RX ORDER — SODIUM CHLORIDE 450 MG/100ML
125 INJECTION, SOLUTION INTRAVENOUS CONTINUOUS
Status: DISCONTINUED | OUTPATIENT
Start: 2024-09-12 | End: 2024-09-16

## 2024-09-12 RX ORDER — POTASSIUM CHLORIDE 20 MEQ/1
40 TABLET, EXTENDED RELEASE ORAL ONCE
Status: COMPLETED | OUTPATIENT
Start: 2024-09-12 | End: 2024-09-12

## 2024-09-12 RX ORDER — POTASSIUM CHLORIDE 14.9 MG/ML
20 INJECTION INTRAVENOUS
Status: COMPLETED | OUTPATIENT
Start: 2024-09-12 | End: 2024-09-12

## 2024-09-12 RX ORDER — CALCIUM GLUCONATE 20 MG/ML
2 INJECTION, SOLUTION INTRAVENOUS ONCE
Status: COMPLETED | OUTPATIENT
Start: 2024-09-12 | End: 2024-09-12

## 2024-09-12 RX ORDER — POTASSIUM CHLORIDE 29.8 MG/ML
40 INJECTION INTRAVENOUS ONCE
Status: COMPLETED | OUTPATIENT
Start: 2024-09-12 | End: 2024-09-13

## 2024-09-12 RX ORDER — SINCALIDE 5 UG/5ML
0.02 INJECTION, POWDER, LYOPHILIZED, FOR SOLUTION INTRAVENOUS ONCE
Status: COMPLETED | OUTPATIENT
Start: 2024-09-12 | End: 2024-09-12

## 2024-09-12 ASSESSMENT — ACTIVITIES OF DAILY LIVING (ADL)
EFFECT OF PAIN ON DAILY ACTIVITIES: UNABLE TO PERFORM
EFFECT OF PAIN ON DAILY ACTIVITIES: DECREASED ACTIVITY

## 2024-09-12 ASSESSMENT — PAIN SCALES - GENERAL
PAINLEVEL_OUTOF10: 8
PAINLEVEL_OUTOF10: 4
PAINLEVEL_OUTOF10: 0 - NO PAIN
PAINLEVEL_OUTOF10: 2
PAINLEVEL_OUTOF10: 8
PAINLEVEL_OUTOF10: 5 - MODERATE PAIN
PAINLEVEL_OUTOF10: 0 - NO PAIN
PAINLEVEL_OUTOF10: 10 - WORST POSSIBLE PAIN
PAINLEVEL_OUTOF10: 8
PAINLEVEL_OUTOF10: 2
PAINLEVEL_OUTOF10: 8
PAINLEVEL_OUTOF10: 8
PAINLEVEL_OUTOF10: 2
PAINLEVEL_OUTOF10: 2
PAINLEVEL_OUTOF10: 5 - MODERATE PAIN
PAINLEVEL_OUTOF10: 0 - NO PAIN
PAINLEVEL_OUTOF10: 2

## 2024-09-12 ASSESSMENT — PAIN DESCRIPTION - DESCRIPTORS
DESCRIPTORS: ACHING;DISCOMFORT
DESCRIPTORS: OTHER (COMMENT)
DESCRIPTORS: ACHING;DISCOMFORT

## 2024-09-12 ASSESSMENT — PAIN DESCRIPTION - LOCATION
LOCATION: BACK

## 2024-09-12 ASSESSMENT — PAIN DESCRIPTION - ORIENTATION
ORIENTATION: LOWER

## 2024-09-12 ASSESSMENT — PAIN SCALES - WONG BAKER
WONGBAKER_NUMERICALRESPONSE: NO HURT
WONGBAKER_NUMERICALRESPONSE: NO HURT

## 2024-09-12 NOTE — PROGRESS NOTES
Physical Therapy                 Therapy Communication Note    Patient Name: Bereket Schaefer  MRN: [id number]  Department: [facility]  Room: 410/410-A  Today's Date: 9/12/2024     Discipline: Physical Therapy    Missed Visit Reason: Missed Visit Reason: Patient placed on medical hold    Missed Time: Cancel    Comment: 9:11 this AM; bedside RN reported pt is medically not appropriate for PT.  Pt with low potassium and low calcium, requiring stat replacement.

## 2024-09-12 NOTE — PROGRESS NOTES
Subjective Data:  Patient currently feeling well although had some extreme muscular spasm and cramping earlier this morning    Overnight Events:    As described below.     Objective Data:  Last Recorded Vitals:  Vitals:    09/12/24 0200 09/12/24 0300 09/12/24 0400 09/12/24 0500   BP: 126/75 124/84 121/89 110/74   Pulse: 58 60 71 63   Resp:       Temp:       TempSrc:       SpO2: 95% 94% 99% 98%   Weight:       Height:           Last Labs:  CBC - 9/12/2024:  3:55 AM  7.6 10.6 237    30.2      CMP - 9/12/2024:  8:14 AM  7.3 4.5 261 --- 0.5   2.6 2.8 87 72      PTT - No results in last year.  1.1   11.2 _     HGBA1C   Date/Time Value Ref Range Status   09/10/2024 05:17 PM 5.6 See below % Final     VLDL   Date/Time Value Ref Range Status   11/08/2018 05:37 AM 37 0 - 40 mg/dL Final      Last I/O:  I/O last 3 completed shifts:  In: 4005.8 (54.5 mL/kg) [I.V.:3605.8 (49.1 mL/kg); IV Piggyback:400]  Out: 2545 (34.6 mL/kg) [Urine:2545 (1 mL/kg/hr)]  Weight: 73.5 kg     Past Cardiology Tests (Last 3 Years):  EKG:  No results found for this or any previous visit from the past 1095 days.    Echo:  Transthoracic Echo (TTE) Complete 09/11/2024    Ejection Fractions:  EF   Date/Time Value Ref Range Status   09/11/2024 03:05 PM 58 %      Cath:  No results found for this or any previous visit from the past 1095 days.    Stress Test:  No results found for this or any previous visit from the past 1095 days.    Cardiac Imaging:  No results found for this or any previous visit from the past 1095 days.      Inpatient Medications:  Scheduled medications   Medication Dose Route Frequency    [Held by provider] amLODIPine  10 mg oral Daily    calcium gluconate  2 g intravenous Once    enoxaparin  40 mg subcutaneous q24h    escitalopram  10 mg oral Daily    lidocaine  5 mL infiltration Once    magnesium sulfate  2 g intravenous Once    nicotine  1 patch transdermal Daily    pantoprazole  40 mg oral Daily before breakfast    Or    pantoprazole   40 mg intravenous Daily before breakfast    piperacillin-tazobactam  3.375 g intravenous q6h     PRN medications   Medication    acetaminophen    Or    acetaminophen    Or    acetaminophen    alteplase    diphenhydrAMINE    HYDROmorphone    ketorolac    oxyCODONE     Continuous Medications   Medication Dose Last Rate    sodium chloride  125 mL/hr         Physical Exam:  The patient is a fairly well-appearing bearded middle-aged white male lying in bed visiting with wife.  He is awake alert conversant not short of breath no overt painful distress  JVP not elevated carotid impulses 2+  Chest fair air movement breath sounds are clear  The cardiac rhythm is regular no premature beats S1-S2 normal no gallop murmur rub  Abdomen soft and benign  No peripheral edema pedal pulses are present.     Assessment/Plan   9/11: The patient is a 48-year-old white male whose past history includes hypertension treated with amlodipine lisinopril no diuretic, hyperlipidemia managed with atorvastatin 20 mg daily, muscular low back pain for which he been on baclofen. Patient admitted with extreme generalized muscle weakness and profound hypokalemia with a potassium of less than 1.0 along with elevated CPK. Etiology of the CK elevation at this point not related to any memorable muscular trauma. Myositis related to adverse reaction to medication being considered atorvastatin and baclofen have been discontinued. Patient also conceivably could have polymyositis or is a related connective tissue disorder and will check CRP sedimentation rate and FRENCH plus and aldolase. The hypokalemia is of uncertain etiology the possibility of hyperaldosteronism is being evaluated with serum aldosterone renin levels pending. No adrenal mass on the abdominal CT. Fortunately no arrhythmias related to the hypokalemia will continue telemetry monitor check echocardiogram.     9/12: The patient was seen and events reviewed.  Patient evidently had an episode of  muscular spasm and tetany earlier this morning.  The patient's CBC includes hematocrit of 30.2 and a WBC of 7600.  Comprehensive metabolic panel still notable for hypokalemia 2.4 with a calcium of 7.4.  The serum sodium has risen to 146 and his IV fluids have been switched to half-normal saline.  Patient had a magnesium level 1.60.  Currently receiving2 g of IV calcium gluconate and 2 g of supplemental magnesium sulfate along with ongoing oral potassium supplementation.  Serum aldosterone and renin levels are pending.  Sedimentation rate is only 6.  CRP is modestly elevated at 8.2 aldolase pending.  Somewhat surprisingly his CK is risen further to 10,923.  Etiology of the electrolyte imbalance still uncertain question potassium wasting nephropathy.  Hyperaldosterone evaluation in progress but the abdominal CT did not show any evidence of an adrenal adenoma or mass.  Statin therapy has been discontinued along with baclofen to rule out medication related myositis.  Will check an H MG Co. a reductase inhibitor antibody level.  The patient's echocardiogram was unremarkable and demonstrated a preserved LV ejection fraction at 55 to 60%.  Events reviewed in detail with patient and family.      PICC - Adult 09/11/24 Double lumen Right Brachial vein (Active)   Site Assessment Clean;Dry;Intact 09/12/24 0828   Dressing Status Clean;Dry;Occlusive 09/12/24 0828   Number of days: 1       Peripheral IV 09/10/24 20 G Right;Anterior Forearm (Active)   Site Assessment Clean;Dry;Intact 09/12/24 0828   Dressing Status Clean;Dry 09/12/24 0828   Number of days: 2       Code Status:  Full Code    I spent 20 minutes in the professional and overall care of this patient.        Joe Padilla MD

## 2024-09-12 NOTE — CONSULTS
Inpatient consult to Acute Care Surgery  Consult performed by: Britni Birmingham, APRN-CNP  Consult ordered by: Ayde Lorenzo MD      Reason For Consult  Acute cholecystitis?     Location [facility]     History Of Present Illness  Bereket Schaefer is a 48 y.o. male on day 2 of admission presenting with Hypokalemia.    Patient presented with chief complaint of profound weakness.  His wife at bedside says that he was very confused and lethargic and not able to move.  She thought he was having a stroke so she brought him here.  All of the above happened Saturday afternoon.  Prior to admission patient denies any angina, shortness of breath, cough, sputum, diarrhea, dysuria, abdominal pain, nausea, or vomiting.  He was admitted with found hypokalemia, profound weakness, rhabdomyolysis, chronic pain, and hypertension.  We were asked to see the patient 9/11 for abdominal pain and cecum thickening.  Patient and wife at bedside deny the patient having any abdominal pain recently, prior to admission, since admission, or now.  Denies ever having hypokalemia before other than a few years ago but states it was only very mild at that time.  Denies any history of kidney disease. His potassium when he came in on 9/10 was less than 1.      Denies taking any anticoagulants or prescription antiplatelet medications prior to admission.  Is on Lovenox here.  See his below past medical history.  Denies any past abdominal or inguinal surgeries.    We saw him in consultation on 9/11 for abdominal pain and cecum thickening.  We were reconsulted to see him today for possible acute cholecystitis.    He had a right upper quadrant ultrasound done today for the indications of elevated LFTs that the impression demonstrated as per below:  1. There is marked gallbladder wall thickening with what appears to be pericholecystic fluid and tenderness upon palpation of the gallbladder with the ultrasound transducer. The gallbladder wall thickening  is a new finding compared to the CT from 09/10/2024 and raises concern for acute cholecystitis although no gallstones were identified.   2. There is no dilatation of the common duct. 3. Incidental note is made of a cyst within the left lobe of the liver near the dome.      Denies having any abdominal pain, nausea or vomiting since seen initially in consultation on 9/11.  As of this afternoon around 1230/1300 he developed pain to his right upper lateral side.  At that time he had not eaten any lunch yet and for breakfast he had 1 and a half hashbrowns from [employer].  This right upper lateral side pain was sudden, intermittent and only comes when he lays down for too long and it is about the same from when it started, sharp, no radiation, describes it as a 9 to a 9.5 when he is laying and when he sitting is a 3-4.  Denies injuring the area.  Denies ever having this pain before.  Denies ever being diagnosed with gallbladder disease before.  Denies any food intolerances in general.  Denies any problems with foods that are heavy, fatty, greasy or fried stating that he lives on a lot of that.  When asked if he has any postprandial symptoms denies ever having any abdominal pain but says that he does rarely have nausea after eating about once a year.  Denies ever having jaundice or icterus.  P.o. last around 0900/0930 this morning.  Passing gas had diarrhea 3 times between yesterday and today without blood.  Does not feel bloated.  Denies ever having nephrolithiasis before.  Denies any urinary symptoms. He has never had an EGD or colonoscopy before.      Denies any previous history of elevated LFTs.  Denies any current alcohol use.  Says between the ages of 19 and 29 years old he did drink heavy.  Denies any known liver disease-any hepatitis, hepatitis A/B/C, cirrhosis, ever having mono.  Does not take Tylenol excessively.  Does take Vicodin as needed 6 a day for his chronic back pain.  During the months of June and July  for his chronic back pain he was taking ibuprofen 600 mg tablets taking 6-7 of those per day.  As of August he no longer takes ibuprofen and is back to the Vicodin.  Has never had an ulcer before.  Does not take the ibuprofen with food.  Just uses Pepcid as needed when he has GERD which does alleviate the GERD symptoms.    Past Medical History  Chronic back pain-on Vicodin  Current cigarette smoker-probable COPD  Elevated urine levels of drugs  GERD  Hyperlipidemia  Hypertension  Lumbar wedge compression fracture  Other psychoactive substance use    Surgical History  Lumbar fusion 2013  Pain block to his back and under general anesthesia approximately 6 years ago  Vasectomy 2013  Denies any other surgeries or abdominal surgeries or any groin surgeries  No surgeries listed under chart review surgery tab     Social History  From home with his wife  Works as a   Current smoker for over 30 years of 1 to 1-1/2 packs a day  No alcohol or drugs     Family History  Mother-benign colon polyps removed multiple times  No family history of colon cancer     Allergies  Allergies   Allergen Reactions    Aspirin Hives      Review of Systems  1) Anesthetic complications: No known personal or family history of anesthetic complications  2) General: No significant unintentional weight gain, fevers, chills, fatigue, appetite loss.  Says that he has lost about 12 pounds over the past month due to eating less due to his job because they are supposed to get a break to eat but they do not and he says normally eats just meal a day.  Weakness as per HPI  3) HEENT: Negative.  4) Cardiac: No angina or leg edema.  5) Pulmonary: No asthma, wheezing, sob.  6) GI: No abd pain, n/v, appetite loss, changes in bowel habits, etc.   7) Hematologic: No known personal or family history of bleeding diathesis.  8) Endocrine: No diabetes or thyroid disorders.  9) : No dysuria, hematuria, or frequency.  10) Musculoskeletal: Weakness as  per HPI.   11) Neuro: No history of seizures or strokes.  12) Psych: No anxiety or depression    Positive right upper lateral side pain as per HPI.     Physical Exam  1) VS-noted as documented.  2) General-laying in bed in no acute distress. Not septic appearing. Pleasant and cooperative. Looks fine and comfortable.   3) Neuro-Awake, alert, oriented to person, place, and time. Speech is normal. Affect is normal. Follows commands appropriately.  4) HEENT-Head normocephalic and externally atraumatic. Conjunctiva pink. Sclera anicteric. MMM.  5) Heart-RRR. SV rhythm on the monitor with a rate of 63  Blood pressure on the monitor is 129/78 with a map of 93  6) Lungs-Clear. Speaks in complete sentences and does not have any accessory muscle use.  7) Extremities-No edema. The patient's extremities are warm and normal color.  8) Skin-Warm and dry. No diaphoresis or jaundice.  9) Psych-Normal mood. Appropriate affect.   10) Abdomen-Soft. Positive bowel sounds. Non distended. Non tender.   No guarding/rebound   No abdominal pain when I shake his abdomen a little bit  ? Small umbilical hernia that is benign    Right upper lateral side is tender patient rates a 3-4.    No CVA tenderness bilaterally    Right lower ribs are not tender    Dr Lorenzo just at bedside    Vital signs in last 24 hours:  Temp:  [36.6 °C (97.9 °F)-36.7 °C (98.1 °F)] 36.6 °C (97.9 °F)  Heart Rate:  [56-82] 71  Resp:  [14-27] 17  BP: (110-144)/(64-96) 134/86  Heart Rate:  [56-82]   Temp:  [36.6 °C (97.9 °F)-36.7 °C (98.1 °F)]   Resp:  [14-27]   BP: (110-144)/(64-96)   SpO2:  [94 %-99 %]      Intake/Output last 3 Shifts:  I/O last 3 completed shifts:  In: 4005.8 (54.5 mL/kg) [I.V.:3605.8 (49.1 mL/kg); IV Piggyback:400]  Out: 2545 (34.6 mL/kg) [Urine:2545 (1 mL/kg/hr)]  Weight: 73.5 kg     Scheduled medications  enoxaparin, 40 mg, subcutaneous, q24h  escitalopram, 10 mg, oral, Daily  lidocaine, 5 mL, infiltration, Once  nicotine, 1 patch, transdermal,  Daily  pantoprazole, 40 mg, oral, Daily before breakfast   Or  pantoprazole, 40 mg, intravenous, Daily before breakfast  piperacillin-tazobactam, 3.375 g, intravenous, q6h      Continuous medications  sodium chloride, 125 mL/hr, Last Rate: 125 mL/hr (09/12/24 1044)      PRN medications  PRN medications: acetaminophen **OR** acetaminophen **OR** acetaminophen, alteplase, diphenhydrAMINE, HYDROmorphone, ketorolac, oxyCODONE    Relevant Results  Results from last 7 days   Lab Units 09/12/24  0355 09/11/24  0354 09/10/24  1204   WBC AUTO x10*3/uL 7.6 8.3 10.0   HEMOGLOBIN g/dL 10.6* 11.1* 15.0   HEMATOCRIT % 30.2* 30.7* 41.3   PLATELETS AUTO x10*3/uL 237 246 365      Results from last 7 days   Lab Units 09/12/24  1214 09/12/24  0814 09/12/24  0355   SODIUM mmol/L 145 148* 146*  146*   POTASSIUM mmol/L 2.6* 2.8* 2.4*  2.4*   CHLORIDE mmol/L 111* 113* 112*  112*   CO2 mmol/L 24 24 24  24   BUN mg/dL <3* 3* 4*  4*   CREATININE mg/dL 0.70 0.80 0.80  0.80   GLUCOSE mg/dL 161* 95 102*  102*   CALCIUM mg/dL 7.9* 7.3* 7.4*  7.4*      Results from last 7 days   Lab Units 09/12/24  1047   POCT PH, ARTERIAL pH 7.45*   POCT PCO2, ARTERIAL mm Hg 33*   POCT HCO3 CALCULATED, ARTERIAL mmol/L 22.9   POCT BASE EXCESS, ARTERIAL mmol/L -0.6     US right upper quadrant    Result Date: 9/12/2024  Interpreted By:  Wang Mariee, STUDY:  RIGHT UPPER QUADRANT; 9/12/2024 3:24 pm   INDICATION: Signs/Symptoms:elevated LFTs.   COMPARISON: None.   ACCESSION NUMBER(S): [id number]   ORDERING CLINICIAN: TY BRIGGS   TECHNIQUE: Grayscale and color flow images were obtained of the right upper quadrant.   FINDINGS: The head and body of the pancreas are normal in appearance.  The pancreatic tail is  obscured by gas. There is a 5 mm simple cyst dome of the left lobe of the liver. Hepatic parenchyma is otherwise unremarkable in appearance.  No gallstones are identified.  Bladder wall is markedly thickened measuring up to 12 mm thick with  edema like echogenicity within the wall. Patient was also tender upon palpation of the gallbladder with the ultrasound transducer. The common bile duct measures 2.2 mm. Visualized portions of the right kidney are unremarkable.       1. There is marked gallbladder wall thickening with what appears to be pericholecystic fluid and tenderness upon palpation of the gallbladder with the ultrasound transducer. The gallbladder wall thickening is a new finding compared to the CT from 09/10/2024 and raises concern for acute cholecystitis although no gallstones were identified. 2. There is no dilatation of the common duct. 3. Incidental note is made of a cyst within the left lobe of the liver near the dome.   MACRO: Wang Mariee communicated finding via epic secure chat with Juan Luis Lorenzo MD on 9/12/2024 at 3:48 pm.  (**-RCF-**) Findings:  See findings.   Signed by: Wang Mariee 9/12/2024 3:52 PM Dictation workstation:   [id number]    Transthoracic Echo (TTE) Complete    Result Date: 9/11/2024            [place] [address]             Phone [phone] TRANSTHORACIC ECHOCARDIOGRAM REPORT Patient Name:     LADAN Campos Physician:  [id number] Padilla MD Study Date:       9/11/2024            Ordering Provider:  [id number] PADILLA MRN/PID:          [id number]             Fellow: Accession#:       [id number]         Nurse: Date of           1975 / [birth year]      Sonographer:        Summer Ghotra RDCS Birth/Age:        years Gender:           M                    Additional Staff: Height:           182.88 cm            Admit Date: Weight:           73.48 kg             Admission Status:   Inpatient - Routine BSA / BMI:        1.95 m2 / 21.97      Department          [facility]                   kg/m2                Location: Blood  Pressure: 118 /73 mmHg Study Type:    TRANSTHORACIC ECHO (TTE) COMPLETE Diagnosis/ICD: Cardiomyopathy, unspecified-I42.9 Indication:    cardiomyopathy CPT Codes:     Echo Complete w Full Doppler-[id number]  Study Detail: The following Echo studies were performed: 2D, M-Mode, Doppler and               color flow.  PHYSICIAN INTERPRETATION: Left Ventricle: The left ventricular systolic function is normal, with a visually estimated ejection fraction of 55-60%. There are no regional wall motion abnormalities. The left ventricular cavity size is normal. Spectral Doppler shows a normal pattern of left ventricular diastolic filling. Left Atrium: The left atrium is normal in size. Right Ventricle: The right ventricle is normal in size. There is normal right ventriclar wall thickness. There is normal right ventricular global systolic function. Right Atrium: The right atrium is normal in size. Aortic Valve: The aortic valve is trileaflet. The aortic valve dimensionless index is 0.82. There is no evidence of aortic valve regurgitation. The peak instantaneous gradient of the aortic valve is 9.2 mmHg. The mean gradient of the aortic valve is 5.0 mmHg. Mitral Valve: The mitral valve is normal in structure. There is normal mitral valve leaflet mobility. There is no evidence of mitral valve regurgitation. Tricuspid Valve: The tricuspid valve is structurally normal. There is normal tricuspid valve leaflet mobility. There is trace tricuspid regurgitation. Pulmonic Valve: The pulmonic valve is structurally normal. There is trace pulmonic valve regurgitation. Pericardium: There is no pericardial effusion noted. Aorta: The aortic root is normal. There is no dilatation of the aortic arch. There is no dilatation of the ascending aorta. There is no dilatation of the aortic root. Pulmonary Artery: The pulmonary artery is normal in size. The estimated pulmonary artery pressure is normal. Systemic Veins: The inferior vena cava appears moderately  dilated, IVC inspiratory collapse greater than 50%.  CONCLUSIONS:  1. The left ventricular systolic function is normal, with a visually estimated ejection fraction of 55-60%.  2. There is normal right ventricular global systolic function.  3. The inferior vena cava appears moderately dilated, IVC inspiratory collapse greater than 50%. QUANTITATIVE DATA SUMMARY:  2D MEASUREMENTS:           Normal Ranges: LAs:             3.80 cm   (2.7-4.0cm) IVSd:            0.76 cm   (0.6-1.1cm) LVPWd:           0.99 cm   (0.6-1.1cm) LVIDd:           4.58 cm   (3.9-5.9cm) LVIDs:           3.41 cm LV Mass Index:   67.6 g/m2 LV % FS          25.5 %  LA VOLUME:                    Normal Ranges: LA Vol A4C:        38.7 ml    (22+/-6mL/m2) LA Vol A2C:        27.3 ml LA Vol BP:         34.3 ml LA Vol Index A4C:  19.9ml/m2 LA Vol Index A2C:  14.0 ml/m2 LA Vol Index BP:   17.6 ml/m2 LA Area A4C:       15.2 cm2 LA Area A2C:       12.1 cm2 LA Major Axis A4C: 5.1 cm LA Major Axis A2C: 4.6 cm LA Volume Index:   16.4 ml/m2 LA Vol A4C:        34.6 ml LA Vol A2C:        26.7 ml LA Vol Index BSA:  15.7 ml/m2  AORTA MEASUREMENTS:         Normal Ranges: Ao Sinus, d:        2.87 cm (2.1-3.5cm)  LV SYSTOLIC FUNCTION BY 2D PLANIMETRY (MOD):                      Normal Ranges: EF-A4C View:    51 % (>=55%) EF-Visual:      58 % LV EF Reported: 58 %  LV DIASTOLIC FUNCTION:            Normal Ranges: MV Peak E:             0.85 m/s   (0.7-1.2 m/s) MV Peak A:             0.62 m/s   (0.42-0.7 m/s) E/A Ratio:             1.37       (1.0-2.2) MV e'                  0.114 m/s  (>8.0) MV lateral e'          0.13 m/s MV medial e'           0.10 m/s E/e' Ratio:            7.41       (<8.0) PulmV Sys Fabrizio:         53.10 cm/s PulmV Connolly Fabrizio:        48.40 cm/s PulmV S/D Fabrizio:         1.10 PulmV A Revs Fabrizio:      29.60 cm/s  MITRAL VALVE:          Normal Ranges: MV DT:        259 msec (150-240msec)  AORTIC VALVE:                     Normal Ranges: AoV Vmax:                 1.52 m/s (<=1.7m/s) AoV Peak P.2 mmHg (<20mmHg) AoV Mean P.0 mmHg (1.7-11.5mmHg) LVOT Max Fabrizio:            1.30 m/s (<=1.1m/s) AoV VTI:                 28.00 cm (18-25cm) LVOT VTI:                22.90 cm LVOT Diameter:           2.00 cm  (1.8-2.4cm) AoV Area, VTI:           2.57 cm2 (2.5-5.5cm2) AoV Area,Vmax:           2.69 cm2 (2.5-4.5cm2) AoV Dimensionless Index: 0.82  RIGHT VENTRICLE: TAPSE: 28.6 mm RV s'  0.14 m/s  TRICUSPID VALVE/RVSP:         Normal Ranges: IVC Diam:             2.72 cm  Pulmonary Veins: PulmV A Revs Fabrizio: 29.60 cm/s PulmV Connolly Fabrizio:   48.40 cm/s PulmV S/D Fabrizio:    1.10 PulmV Sys Fabrizio:    53.10 cm/s  [id number] Joe Padilla MD Electronically signed on 2024 at 5:11:14 PM  ** Final **     Bedside PICC Imaging    Result Date: 2024  These images are not reportable by radiology and will not be interpreted by  Radiologists.    CT abdomen pelvis w IV contrast    Result Date: 9/10/2024  Interpreted By:  Maritza Garcia, STUDY: CT ABDOMEN PELVIS W IV CONTRAST;  9/10/2024 2:21 pm   INDICATION: Signs/Symptoms:groin pain.   COMPARISON: Lumbar spine dated 2017   ACCESSION NUMBER(S): [id number]   ORDERING CLINICIAN: FIGUEROA EVANS   TECHNIQUE: CT of the abdomen and pelvis was performed following the intravenous administration 75 mL Omnipaque 350. Sagittal and coronal reconstructions are generated.   FINDINGS: LOWER CHEST: There appear to be faint coronary artery calcifications.   ABDOMEN:   LIVER: Unremarkable   BILE DUCTS: There is no evidence of biliary dilatation.   GALLBLADDER: There are no obvious gallstones.   PANCREAS: Unremarkable   SPLEEN: Unremarkable   ADRENAL GLANDS: There are no adrenal masses.   KIDNEYS AND URETERS: The kidneys are not obstructed. There is a tiny non-obstructing right renal calculus.   The ureters are normal in caliber.   PELVIS:   BLADDER: Unremarkable   REPRODUCTIVE ORGANS: The prostate is visualized.   BOWEL: There is no significant  bowel distention. Appendix is not obvious. There is a question of slight wall thickening of the cecum. There is also some thickening of the wall of the descending colon. There is sigmoid diverticulosis.   VESSELS: There are atherosclerotic changes of the aorta. The cava is unremarkable.   PERITONEUM/RETROPERITONEUM/LYMPH NODES: There is no free air or free fluid.   There are numerous enlarged right-sided mesenteric lymph nodes.   BONES AND ABDOMINAL WALL: Patient is status is pinning of lumbar spine. There is a fracture of L2. It is heterogeneous.   COMPARISON OF FINDINGS: The lumbar spine fracture was present previously       Question of thickening of the wall of the cecum add distal descending colon..   Numerous slightly prominent nodes in the right lower quadrant.   MACRO: none   Signed by: Maritza Garcia 9/10/2024 3:05 PM Dictation workstation:   [id number]    CT head wo IV contrast    Result Date: 9/10/2024  Interpreted By:  Maritza Garcia, STUDY: CT HEAD WO IV CONTRAST; ;  9/10/2024 2:21 pm   INDICATION: Signs/Symptoms:R sided droop w/ slurred speech.   COMPARISON: 01/13/2017   ACCESSION NUMBER(S): [id number]   ORDERING CLINICIAN: FIGUEROA EVANS   TECHNIQUE: Serial axial images of the head were obtained without intravenous contrast. Sagittal and coronal reconstructions were generated.   FINDINGS: The ventricles are midline and normal in size.   There are no acute parenchymal abnormalities.   There is no hemorrhage or extra-axial fluid.   There is no obvious scalp hematoma or skull fracture.   The visualized portions of the paranasal sinuses and mastoids are unremarkable.   COMPARISON OF FINDINGS: The brain is similar.       No acute intracranial abnormality.     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  Morning CBC and CMP already ordered  Will order stat HIDA scan and if positive can put him on the surgery schedule tomorrow-after HIDA scan is totally completed today per nuclear medicine patient may have clear liquids until midnight tonight and is to be n.p.o. after midnight tonight-order written-await results  Already has IV fluids ordered    Question of thickening of the wall of the cecum and distal descending colon and numerous slightly prominent nodes of the right lower quadrant on 9/11 CT abdomen pelvis with IV contrast   Colonoscopy per GI-Per today's note can be done as outpatient    DVT prophylaxis  Is on Lovenox-but gets it at 2100     SURYA Hall-CNP    Doubt any gallbladder pathology. Will follow-up HIDA.

## 2024-09-12 NOTE — PROGRESS NOTES
Bereket Schaefer is a 48 y.o. male on day 1 of admission presenting with Hypokalemia.           Subjective  Today with smith hand cramping, severe pain; seen with Dr Garcia. Believe 2/2 low Ca, replacement orders given for Ca, Ma, 1/2 NS, all d/w Dr KIERSTEN Garcia at bedside              Objective  Last Recorded Vitals  /63 (BP Location: Left arm, Patient Position: Lying)   Pulse 71   Temp 36.5 °C (97.7 °F) (Temporal)   Resp 16   Wt 73.5 kg (162 lb 0.6 oz)   SpO2 95%   Intake/Output last 3 Shifts:     Intake/Output Summary (Last 24 hours) at 9/11/2024 0829  Last data filed at 9/11/2024 0755      Gross per 24 hour   Intake 2237.08 ml   Output 1045 ml   Net 1192.08 ml         Physical Exam  Alert oriented x 3 cooperative significant distress 2/2 pain,wife is at bedside  Normocephalic/atraumatic EOMI PERRLA  Chest clear  Heart regular  Abdomen soft nontender  Extremities no edema positive pain over major muscle groups  Neurologic exam smith hand painful cramping  Relevant Results  Lab results reviewed with nephrology        Assessment/Plan  Principal Problem:    Hypokalemia        September 12              Profound hypokalemia, slowly correcting  Profound weakness  Rhabdomyolysis, change IVF to 1/2NS  Chronic pain  Hypertension  Hypocalcemia symptomatic, see orders     Updated 4:15 PM:    The patient's spasms of his hands and forearms from the morning he resolved completely there is no evidence on exam for any compartment symptoms.  He started to complain of some poorly described ache in the right flank over an area of 1 square inch which to my exam felt to be may be muscle?  His abdominal exam shows no right upper quadrant tenderness guarding or rebound.  Ultrasound of the gallbladder results reviewed.  Surgical NP back in the room to reevaluate patient.  Continue Zosyn for now, diet will be per surgery    Ayde Lorenzo MD

## 2024-09-12 NOTE — PROGRESS NOTES
Bereket Schaefer is a 48 y.o. male on day 2 of admission presenting with Hypokalemia.    Subjective   Patient denies any abdominal pain, nausea, vomiting        Objective     Physical Exam    Last Recorded Vitals  Blood pressure 122/83, pulse 72, temperature 36.6 °C (97.9 °F), temperature source Temporal, resp. rate 19, height 1.829 m (6'), weight 73.5 kg (162 lb 0.6 oz), SpO2 98%.  Intake/Output last 3 Shifts:  I/O last 3 completed shifts:  In: 4005.8 (54.5 mL/kg) [I.V.:3605.8 (49.1 mL/kg); IV Piggyback:400]  Out: 2545 (34.6 mL/kg) [Urine:2545 (1 mL/kg/hr)]  Weight: 73.5 kg     Relevant Results               This patient has a central line   Reason for the central line remaining today?       Results for orders placed or performed during the hospital encounter of 09/10/24 (from the past 24 hour(s))   Basic Metabolic Panel   Result Value Ref Range    Glucose 120 (H) 65 - 99 mg/dL    Sodium 145 133 - 145 mmol/L    Potassium 1.9 (LL) 3.4 - 5.1 mmol/L    Chloride 108 (H) 97 - 107 mmol/L    Bicarbonate 25 24 - 31 mmol/L    Urea Nitrogen 7 (L) 8 - 25 mg/dL    Creatinine 0.80 0.40 - 1.60 mg/dL    eGFR >90 >60 mL/min/1.73m*2    Calcium 7.7 (L) 8.5 - 10.4 mg/dL    Anion Gap 12 <=19 mmol/L   C-Reactive Protein, High Sensitivity   Result Value Ref Range    CRP, High Sensitivity 8.2 (H) <1.0 mg/L   Basic Metabolic Panel   Result Value Ref Range    Glucose 145 (H) 65 - 99 mg/dL    Sodium 146 (H) 133 - 145 mmol/L    Potassium 2.0 (LL) 3.4 - 5.1 mmol/L    Chloride 110 (H) 97 - 107 mmol/L    Bicarbonate 24 24 - 31 mmol/L    Urea Nitrogen 6 (L) 8 - 25 mg/dL    Creatinine 0.90 0.40 - 1.60 mg/dL    eGFR >90 >60 mL/min/1.73m*2    Calcium 7.7 (L) 8.5 - 10.4 mg/dL    Anion Gap 12 <=19 mmol/L   Carcinoembryonic Antigen   Result Value Ref Range    Carcinoembryonic AG 1.4 ug/L   Transthoracic Echo (TTE) Complete   Result Value Ref Range    AV pk azar 1.52 m/s    LVOT diam 2.00 cm    AV mn grad 5.0 mmHg    MV E/A ratio 1.37     Tricuspid annular  plane systolic excursion 2.9 cm    LA vol index A/L 17.6 ml/m2    LV EF 58 %    RV free wall pk S' 13.50 cm/s    LVIDd 4.58 cm    AV pk grad 9.2 mmHg    Aortic Valve Area by Continuity of VTI 2.57 cm2    Aortic Valve Area by Continuity of Peak Velocity 2.69 cm2    LV A4C EF 50.6    Basic Metabolic Panel   Result Value Ref Range    Glucose 155 (H) 65 - 99 mg/dL    Sodium 146 (H) 133 - 145 mmol/L    Potassium 2.0 (LL) 3.4 - 5.1 mmol/L    Chloride 109 (H) 97 - 107 mmol/L    Bicarbonate 24 24 - 31 mmol/L    Urea Nitrogen 6 (L) 8 - 25 mg/dL    Creatinine 0.80 0.40 - 1.60 mg/dL    eGFR >90 >60 mL/min/1.73m*2    Calcium 7.8 (L) 8.5 - 10.4 mg/dL    Anion Gap 13 <=19 mmol/L   Basic Metabolic Panel   Result Value Ref Range    Glucose 135 (H) 65 - 99 mg/dL    Sodium 145 133 - 145 mmol/L    Potassium 2.0 (LL) 3.4 - 5.1 mmol/L    Chloride 108 (H) 97 - 107 mmol/L    Bicarbonate 24 24 - 31 mmol/L    Urea Nitrogen 5 (L) 8 - 25 mg/dL    Creatinine 0.80 0.40 - 1.60 mg/dL    eGFR >90 >60 mL/min/1.73m*2    Calcium 7.9 (L) 8.5 - 10.4 mg/dL    Anion Gap 13 <=19 mmol/L   Basic metabolic panel   Result Value Ref Range    Glucose 128 (H) 65 - 99 mg/dL    Sodium 144 133 - 145 mmol/L    Potassium 2.2 (LL) 3.4 - 5.1 mmol/L    Chloride 108 (H) 97 - 107 mmol/L    Bicarbonate 24 24 - 31 mmol/L    Urea Nitrogen 5 (L) 8 - 25 mg/dL    Creatinine 0.70 0.40 - 1.60 mg/dL    eGFR >90 >60 mL/min/1.73m*2    Calcium 7.6 (L) 8.5 - 10.4 mg/dL    Anion Gap 12 <=19 mmol/L   Basic Metabolic Panel   Result Value Ref Range    Glucose 128 (H) 65 - 99 mg/dL    Sodium 147 (H) 133 - 145 mmol/L    Potassium 2.3 (LL) 3.4 - 5.1 mmol/L    Chloride 112 (H) 97 - 107 mmol/L    Bicarbonate 24 24 - 31 mmol/L    Urea Nitrogen 4 (L) 8 - 25 mg/dL    Creatinine 0.70 0.40 - 1.60 mg/dL    eGFR >90 >60 mL/min/1.73m*2    Calcium 7.5 (L) 8.5 - 10.4 mg/dL    Anion Gap 11 <=19 mmol/L   Creatine Kinase   Result Value Ref Range    Creatine Kinase 10,923 (H) 24 - 195 U/L   CBC and Auto  Differential   Result Value Ref Range    WBC 7.6 4.4 - 11.3 x10*3/uL    nRBC 0.0 0.0 - 0.0 /100 WBCs    RBC 3.52 (L) 4.50 - 5.90 x10*6/uL    Hemoglobin 10.6 (L) 13.5 - 17.5 g/dL    Hematocrit 30.2 (L) 41.0 - 52.0 %    MCV 86 80 - 100 fL    MCH 30.1 26.0 - 34.0 pg    MCHC 35.1 32.0 - 36.0 g/dL    RDW 15.7 (H) 11.5 - 14.5 %    Platelets 237 150 - 450 x10*3/uL    Neutrophils % 56.9 40.0 - 80.0 %    Immature Granulocytes %, Automated 0.3 0.0 - 0.9 %    Lymphocytes % 33.3 13.0 - 44.0 %    Monocytes % 6.9 2.0 - 10.0 %    Eosinophils % 2.1 0.0 - 6.0 %    Basophils % 0.5 0.0 - 2.0 %    Neutrophils Absolute 4.34 1.20 - 7.70 x10*3/uL    Immature Granulocytes Absolute, Automated 0.02 0.00 - 0.70 x10*3/uL    Lymphocytes Absolute 2.54 1.20 - 4.80 x10*3/uL    Monocytes Absolute 0.53 0.10 - 1.00 x10*3/uL    Eosinophils Absolute 0.16 0.00 - 0.70 x10*3/uL    Basophils Absolute 0.04 0.00 - 0.10 x10*3/uL   Comprehensive Metabolic Panel   Result Value Ref Range    Glucose 102 (H) 65 - 99 mg/dL    Sodium 146 (H) 133 - 145 mmol/L    Potassium 2.4 (LL) 3.4 - 5.1 mmol/L    Chloride 112 (H) 97 - 107 mmol/L    Bicarbonate 24 24 - 31 mmol/L    Urea Nitrogen 4 (L) 8 - 25 mg/dL    Creatinine 0.80 0.40 - 1.60 mg/dL    eGFR >90 >60 mL/min/1.73m*2    Calcium 7.4 (L) 8.5 - 10.4 mg/dL    Albumin 2.8 (L) 3.5 - 5.0 g/dL    Alkaline Phosphatase 72 35 - 125 U/L    Total Protein 4.5 (L) 5.9 - 7.9 g/dL     (H) 5 - 40 U/L    Bilirubin, Total 0.5 0.1 - 1.2 mg/dL    ALT 87 (H) 5 - 40 U/L    Anion Gap 10 <=19 mmol/L   Magnesium   Result Value Ref Range    Magnesium 1.60 1.60 - 3.10 mg/dL   Phosphorus   Result Value Ref Range    Phosphorus 2.6 2.5 - 4.5 mg/dL   Basic Metabolic Panel   Result Value Ref Range    Glucose 102 (H) 65 - 99 mg/dL    Sodium 146 (H) 133 - 145 mmol/L    Potassium 2.4 (LL) 3.4 - 5.1 mmol/L    Chloride 112 (H) 97 - 107 mmol/L    Bicarbonate 24 24 - 31 mmol/L    Urea Nitrogen 4 (L) 8 - 25 mg/dL    Creatinine 0.80 0.40 - 1.60 mg/dL     eGFR >90 >60 mL/min/1.73m*2    Calcium 7.4 (L) 8.5 - 10.4 mg/dL    Anion Gap 10 <=19 mmol/L   Sedimentation Rate   Result Value Ref Range    Sedimentation Rate 6 0 - 15 mm/h   Basic Metabolic Panel   Result Value Ref Range    Glucose 95 65 - 99 mg/dL    Sodium 148 (H) 133 - 145 mmol/L    Potassium 2.8 (LL) 3.4 - 5.1 mmol/L    Chloride 113 (H) 97 - 107 mmol/L    Bicarbonate 24 24 - 31 mmol/L    Urea Nitrogen 3 (L) 8 - 25 mg/dL    Creatinine 0.80 0.40 - 1.60 mg/dL    eGFR >90 >60 mL/min/1.73m*2    Calcium 7.3 (L) 8.5 - 10.4 mg/dL    Anion Gap 11 <=19 mmol/L   Calcium, ionized   Result Value Ref Range    POCT Calcium, Ionized 1.07 (L) 1.1 - 1.33 mmol/L   Blood Gas Arterial Full Panel   Result Value Ref Range    POCT pH, Arterial 7.45 (H) 7.38 - 7.42 pH    POCT pCO2, Arterial 33 (L) 38 - 42 mm Hg    POCT pO2, Arterial      POCT SO2, Arterial 95 94 - 100 %    POCT Oxy Hemoglobin, Arterial 92.3 (L) 94.0 - 98.0 %    POCT Hematocrit Calculated, Arterial 34.0 (L) 41.0 - 52.0 %    POCT Sodium, Arterial 144 136 - 145 mmol/L    POCT Potassium, Arterial 2.8 (LL) 3.5 - 5.3 mmol/L    POCT Chloride, Arterial 113 (H) 98 - 107 mmol/L    POCT Ionized Calcium, Arterial 1.10 1.10 - 1.33 mmol/L    POCT Glucose, Arterial 132 (H) 74 - 99 mg/dL    POCT Lactate, Arterial 1.7 0.4 - 2.0 mmol/L    POCT Base Excess, Arterial -0.6 -2.0 - 3.0 mmol/L    POCT HCO3 Calculated, Arterial 22.9 22.0 - 26.0 mmol/L    POCT Hemoglobin, Arterial 11.3 (L) 13.5 - 17.5 g/dL    POCT Anion Gap, Arterial 11 10 - 25 mmo/L    Patient Temperature 37.0 degrees Celsius    FiO2 21 %    Critical Called By SANDRA HERNÁNDEZ     Critical Called To DR DAVIES     Critical Call Time 1100     Critical Read Back Y     Critical Note K     Site of Arterial Puncture Radial Left     Geovanny's Test Positive      Transthoracic Echo (TTE) Complete    Result Date: 9/11/2024            [facility] [address], [place] [address]             Phone [phone]  TRANSTHORACIC ECHOCARDIOGRAM REPORT Patient Name:     LADAN RAJAN          Beth Physician:  [id number] Joe Beck MD Study Date:       9/11/2024            Ordering Provider:  [id number] JOE BECK MRN/PID:          [id number]             Fellow: Accession#:       [id number]         Nurse: Date of           1975 / [birth year]      Sonographer:        Summer Mckeontom RDCS Birth/Age:        years Gender:           M                    Additional Staff: Height:           182.88 cm            Admit Date: Weight:           73.48 kg             Admission Status:   Inpatient - Routine BSA / BMI:        1.95 m2 / 21.97      Department          [facility]                   kg/m2                Location: Blood Pressure: 118 /73 mmHg Study Type:    TRANSTHORACIC ECHO (TTE) COMPLETE Diagnosis/ICD: Cardiomyopathy, unspecified-I42.9 Indication:    cardiomyopathy CPT Codes:     Echo Complete w Full Doppler-[id number]  Study Detail: The following Echo studies were performed: 2D, M-Mode, Doppler and               color flow.  PHYSICIAN INTERPRETATION: Left Ventricle: The left ventricular systolic function is normal, with a visually estimated ejection fraction of 55-60%. There are no regional wall motion abnormalities. The left ventricular cavity size is normal. Spectral Doppler shows a normal pattern of left ventricular diastolic filling. Left Atrium: The left atrium is normal in size. Right Ventricle: The right ventricle is normal in size. There is normal right ventriclar wall thickness. There is normal right ventricular global systolic function. Right Atrium: The right atrium is normal in size. Aortic Valve: The aortic valve is trileaflet. The aortic valve dimensionless index is 0.82. There is no evidence of aortic valve regurgitation. The peak instantaneous gradient of the aortic valve is 9.2 mmHg. The mean gradient of  the aortic valve is 5.0 mmHg. Mitral Valve: The mitral valve is normal in structure. There is normal mitral valve leaflet mobility. There is no evidence of mitral valve regurgitation. Tricuspid Valve: The tricuspid valve is structurally normal. There is normal tricuspid valve leaflet mobility. There is trace tricuspid regurgitation. Pulmonic Valve: The pulmonic valve is structurally normal. There is trace pulmonic valve regurgitation. Pericardium: There is no pericardial effusion noted. Aorta: The aortic root is normal. There is no dilatation of the aortic arch. There is no dilatation of the ascending aorta. There is no dilatation of the aortic root. Pulmonary Artery: The pulmonary artery is normal in size. The estimated pulmonary artery pressure is normal. Systemic Veins: The inferior vena cava appears moderately dilated, IVC inspiratory collapse greater than 50%.  CONCLUSIONS:  1. The left ventricular systolic function is normal, with a visually estimated ejection fraction of 55-60%.  2. There is normal right ventricular global systolic function.  3. The inferior vena cava appears moderately dilated, IVC inspiratory collapse greater than 50%. QUANTITATIVE DATA SUMMARY:  2D MEASUREMENTS:           Normal Ranges: LAs:             3.80 cm   (2.7-4.0cm) IVSd:            0.76 cm   (0.6-1.1cm) LVPWd:           0.99 cm   (0.6-1.1cm) LVIDd:           4.58 cm   (3.9-5.9cm) LVIDs:           3.41 cm LV Mass Index:   67.6 g/m2 LV % FS          25.5 %  LA VOLUME:                    Normal Ranges: LA Vol A4C:        38.7 ml    (22+/-6mL/m2) LA Vol A2C:        27.3 ml LA Vol BP:         34.3 ml LA Vol Index A4C:  19.9ml/m2 LA Vol Index A2C:  14.0 ml/m2 LA Vol Index BP:   17.6 ml/m2 LA Area A4C:       15.2 cm2 LA Area A2C:       12.1 cm2 LA Major Axis A4C: 5.1 cm LA Major Axis A2C: 4.6 cm LA Volume Index:   16.4 ml/m2 LA Vol A4C:        34.6 ml LA Vol A2C:        26.7 ml LA Vol Index BSA:  15.7 ml/m2  AORTA MEASUREMENTS:          Normal Ranges: Ao Sinus, d:        2.87 cm (2.1-3.5cm)  LV SYSTOLIC FUNCTION BY 2D PLANIMETRY (MOD):                      Normal Ranges: EF-A4C View:    51 % (>=55%) EF-Visual:      58 % LV EF Reported: 58 %  LV DIASTOLIC FUNCTION:            Normal Ranges: MV Peak E:             0.85 m/s   (0.7-1.2 m/s) MV Peak A:             0.62 m/s   (0.42-0.7 m/s) E/A Ratio:             1.37       (1.0-2.2) MV e'                  0.114 m/s  (>8.0) MV lateral e'          0.13 m/s MV medial e'           0.10 m/s E/e' Ratio:            7.41       (<8.0) PulmV Sys Fabrizio:         53.10 cm/s PulmV Connolly Fabrizio:        48.40 cm/s PulmV S/D Fabrizio:         1.10 PulmV A Revs Fabrizio:      29.60 cm/s  MITRAL VALVE:          Normal Ranges: MV DT:        259 msec (150-240msec)  AORTIC VALVE:                     Normal Ranges: AoV Vmax:                1.52 m/s (<=1.7m/s) AoV Peak P.2 mmHg (<20mmHg) AoV Mean P.0 mmHg (1.7-11.5mmHg) LVOT Max Fabrizio:            1.30 m/s (<=1.1m/s) AoV VTI:                 28.00 cm (18-25cm) LVOT VTI:                22.90 cm LVOT Diameter:           2.00 cm  (1.8-2.4cm) AoV Area, VTI:           2.57 cm2 (2.5-5.5cm2) AoV Area,Vmax:           2.69 cm2 (2.5-4.5cm2) AoV Dimensionless Index: 0.82  RIGHT VENTRICLE: TAPSE: 28.6 mm RV s'  0.14 m/s  TRICUSPID VALVE/RVSP:         Normal Ranges: IVC Diam:             2.72 cm  Pulmonary Veins: PulmV A Revs Fabrizio: 29.60 cm/s PulmV Connolly Fabrizio:   48.40 cm/s PulmV S/D Fabrizio:    1.10 PulmV Sys Fabrizio:    53.10 cm/s  [id number] Joe Padilla MD Electronically signed on 2024 at 5:11:14 PM  ** Final **     Bedside PICC Imaging    Result Date: 2024  These images are not reportable by radiology and will not be interpreted by  Radiologists.    CT abdomen pelvis w IV contrast    Result Date: 9/10/2024  Interpreted By:  Maritza Garcia, STUDY: CT ABDOMEN PELVIS W IV CONTRAST;  9/10/2024 2:21 pm   INDICATION: Signs/Symptoms:groin pain.   COMPARISON: Lumbar spine dated  02/27/2017   ACCESSION NUMBER(S): [id number]   ORDERING CLINICIAN: FIGUEROA EVANS   TECHNIQUE: CT of the abdomen and pelvis was performed following the intravenous administration 75 mL Omnipaque 350. Sagittal and coronal reconstructions are generated.   FINDINGS: LOWER CHEST: There appear to be faint coronary artery calcifications.   ABDOMEN:   LIVER: Unremarkable   BILE DUCTS: There is no evidence of biliary dilatation.   GALLBLADDER: There are no obvious gallstones.   PANCREAS: Unremarkable   SPLEEN: Unremarkable   ADRENAL GLANDS: There are no adrenal masses.   KIDNEYS AND URETERS: The kidneys are not obstructed. There is a tiny non-obstructing right renal calculus.   The ureters are normal in caliber.   PELVIS:   BLADDER: Unremarkable   REPRODUCTIVE ORGANS: The prostate is visualized.   BOWEL: There is no significant bowel distention. Appendix is not obvious. There is a question of slight wall thickening of the cecum. There is also some thickening of the wall of the descending colon. There is sigmoid diverticulosis.   VESSELS: There are atherosclerotic changes of the aorta. The cava is unremarkable.   PERITONEUM/RETROPERITONEUM/LYMPH NODES: There is no free air or free fluid.   There are numerous enlarged right-sided mesenteric lymph nodes.   BONES AND ABDOMINAL WALL: Patient is status is pinning of lumbar spine. There is a fracture of L2. It is heterogeneous.   COMPARISON OF FINDINGS: The lumbar spine fracture was present previously       Question of thickening of the wall of the cecum add distal descending colon..   Numerous slightly prominent nodes in the right lower quadrant.   MACRO: none   Signed by: Maritza Garcia 9/10/2024 3:05 PM Dictation workstation:   [id number]    CT head wo IV contrast    Result Date: 9/10/2024  Interpreted By:  Maritza Garcia, STUDY: CT HEAD WO IV CONTRAST; ;  9/10/2024 2:21 pm   INDICATION: Signs/Symptoms:R sided droop w/ slurred speech.   COMPARISON: 01/13/2017   ACCESSION NUMBER(S):  [id number]   ORDERING CLINICIAN: FIGUEROA EVANS   TECHNIQUE: Serial axial images of the head were obtained without intravenous contrast. Sagittal and coronal reconstructions were generated.   FINDINGS: The ventricles are midline and normal in size.   There are no acute parenchymal abnormalities.   There is no hemorrhage or extra-axial fluid.   There is no obvious scalp hematoma or skull fracture.   The visualized portions of the paranasal sinuses and mastoids are unremarkable.   COMPARISON OF FINDINGS: The brain is similar.       No acute intracranial abnormality.     MACRO: none   Signed by: Maritza Garcia 9/10/2024 2:56 PM Dictation workstation:   [id number]              Assessment/Plan   Assessment & Plan  Hypokalemia    Abnormal CT  CT abd/pelvis showed slight wall thickening of the cecum. There is also some thickening of the wall of the descending colon. Possible r/t severe hypokalemia.  Recommend iv hydration, correcting electrolytes including potassium, magnesium and phosphorus, eliminating all colonic hypomotility meds like opiods, benzos, anti-cholinergics, anti-histamines  out of bed, and increased mobility. Ultimately will should have colonoscopy at some point to r/o underlying neoplastic, infectious, or inflammatory conditions which can be done outpatient.  Nephrology following for possible primary hypoaldosteronism which can occur with hypertension and severe hypokalemia.   Continue supportive care.    -Add CEA (there were prominent lymph nodes in the area as well    -No abdominal pain or bloody stools. Less likely ischemic     Elevated LFTs   [place] [address]. Likely component related to rhabdo    -Acute hep panel    -RUQ US        I spent 20 minutes in the professional and overall care of this patient.      Brianna Rodriguez, APRN-CNP

## 2024-09-12 NOTE — PROGRESS NOTES
09/12/24 0856   Discharge Planning   Home or Post Acute Services None   Expected Discharge Disposition Home   Does the patient need discharge transport arranged? No

## 2024-09-12 NOTE — PROGRESS NOTES
Bereket Schaefer is a 48 y.o. male on day 2 of admission presenting with Hypokalemia.    Subjective    patient was seen yesterday for profound hypokalemia with a potassium less than 1 he has been getting both IV infusions of potassium chloride as well as oral potassium chloride his potassium up to 2.8 this morning follow-up severe cramping and spasm in both hands and his right hip fact he is in a lot of pain from that his calcium did drop and also his magnesium is slightly low calcium gluconate as well as magnesium sulfate intravenously both ordered he also was seen for acute rhabdomyolysis which secondary to electrolyte imbalance as well as statin       Objective     Physical Exam  Constitutional:       Comments:  patient is moderate distress because of the pain in his hands because of the spasm   Neck:      Vascular: No carotid bruit.   Cardiovascular:      Rate and Rhythm: Normal rate and regular rhythm.      Heart sounds: No murmur heard.     No friction rub. No gallop.   Pulmonary:      Breath sounds: No wheezing, rhonchi or rales.   Chest:      Chest wall: No tenderness.   Abdominal:      General: There is no distension.      Tenderness: There is no abdominal tenderness. There is no guarding or rebound.   Musculoskeletal:         General: No swelling or tenderness.      Cervical back: Neck supple.      Right lower leg: No edema.      Left lower leg: No edema.      Comments:  patient does have  shvostsign in both hands with severe cramping   Lymphadenopathy:      Cervical: No cervical adenopathy.         Last Recorded Vitals  Blood pressure 110/74, pulse 63, temperature 36.6 °C (97.9 °F), temperature source Temporal, resp. rate 14, height 1.829 m (6'), weight 73.5 kg (162 lb 0.6 oz), SpO2 98%.    Intake/Output last 3 Shifts:  I/O last 3 completed shifts:  In: 4005.8 (54.5 mL/kg) [I.V.:3605.8 (49.1 mL/kg); IV Piggyback:400]  Out: 2545 (34.6 mL/kg) [Urine:2545 (1 mL/kg/hr)]  Weight: 73.5 kg     Current  Facility-Administered Medications:     acetaminophen (Tylenol) tablet 650 mg, 650 mg, oral, q4h PRN **OR** acetaminophen (Tylenol) oral liquid 650 mg, 650 mg, oral, q4h PRN **OR** acetaminophen (Tylenol) suppository 650 mg, 650 mg, rectal, q4h PRN, Ayde Lorenzo MD    alteplase (Cathflo Activase) injection 2 mg, 2 mg, intra-catheter, PRN, Ayde Lorenzo MD    [Held by provider] amLODIPine (Norvasc) tablet 10 mg, 10 mg, oral, Daily, Ayde Lorenzo MD, 10 mg at 09/11/24 0801    calcium gluconate 2 g in sodium chloride (iso)  mL, 2 g, intravenous, Once, Ayde Lorenzo MD, Last Rate: 100 mL/hr at 09/12/24 0918, 2 g at 09/12/24 0918    diphenhydrAMINE (Sominex) tablet 25 mg, 25 mg, oral, q6h PRN, SURYA Ornelas-CNP, 25 mg at 09/11/24 2212    enoxaparin (Lovenox) syringe 40 mg, 40 mg, subcutaneous, q24h, Ayde Lorenzo MD, 40 mg at 09/11/24 2006    escitalopram (Lexapro) tablet 10 mg, 10 mg, oral, Daily, Ayde Lorenzo MD, 10 mg at 09/11/24 2006    HYDROmorphone (Dilaudid) injection 1 mg, 1 mg, intravenous, q4h PRN, Ayde Lorenzo MD, 1 mg at 09/12/24 0919    ketorolac (Toradol) injection 30 mg, 30 mg, intravenous, q6h PRN, LUZ Ornelas, 30 mg at 09/12/24 0345    lidocaine (Xylocaine) 10 mg/mL (1 %) injection 5 mL, 5 mL, infiltration, Once, Ayde Lorenzo MD    magnesium sulfate 2 g in sterile water for injection 50 mL, 2 g, intravenous, Once, Ayde Lorenzo MD, Last Rate: 25 mL/hr at 09/12/24 0918, 2 g at 09/12/24 0918    nicotine (Nicoderm CQ) 21 mg/24 hr patch 1 patch, 1 patch, transdermal, Daily, Ayde Lorenzo MD, 1 patch at 09/12/24 0816    oxyCODONE (Roxicodone) immediate release tablet 5 mg, 5 mg, oral, q6h PRN, Ayde Lorenzo MD, 5 mg at 09/12/24 0613    pantoprazole (ProtoNix) EC tablet 40 mg, 40 mg, oral, Daily before breakfast, 40 mg at 09/11/24 0603 **OR** pantoprazole (ProtoNix) injection  40 mg, 40 mg, intravenous, Daily before breakfast, Ayde Lorenzo MD, 40 mg at 09/12/24 0604    piperacillin-tazobactam (Zosyn) 3.375 g in dextrose (iso) IV 50 mL, 3.375 g, intravenous, q6h, Ayde Lorenzo MD, Stopped at 09/12/24 0555    sodium chloride 0.45 % infusion, 125 mL/hr, intravenous, Continuous, Ayde Lorenzo MD   Relevant Results    Results for orders placed or performed during the hospital encounter of 09/10/24 (from the past 96 hour(s))   CBC and Auto Differential   Result Value Ref Range    WBC 10.0 4.4 - 11.3 x10*3/uL    nRBC 0.0 0.0 - 0.0 /100 WBCs    RBC 4.96 4.50 - 5.90 x10*6/uL    Hemoglobin 15.0 13.5 - 17.5 g/dL    Hematocrit 41.3 41.0 - 52.0 %    MCV 83 80 - 100 fL    MCH 30.2 26.0 - 34.0 pg    MCHC 36.3 (H) 32.0 - 36.0 g/dL    RDW 14.9 (H) 11.5 - 14.5 %    Platelets 365 150 - 450 x10*3/uL    Neutrophils % 64.6 40.0 - 80.0 %    Immature Granulocytes %, Automated 0.3 0.0 - 0.9 %    Lymphocytes % 24.3 13.0 - 44.0 %    Monocytes % 8.9 2.0 - 10.0 %    Eosinophils % 1.4 0.0 - 6.0 %    Basophils % 0.5 0.0 - 2.0 %    Neutrophils Absolute 6.45 1.20 - 7.70 x10*3/uL    Immature Granulocytes Absolute, Automated 0.03 0.00 - 0.70 x10*3/uL    Lymphocytes Absolute 2.43 1.20 - 4.80 x10*3/uL    Monocytes Absolute 0.89 0.10 - 1.00 x10*3/uL    Eosinophils Absolute 0.14 0.00 - 0.70 x10*3/uL    Basophils Absolute 0.05 0.00 - 0.10 x10*3/uL   Sars-CoV-2 PCR   Result Value Ref Range    Coronavirus 2019, PCR Not Detected Not Detected   Urinalysis with Reflex Culture and Microscopic   Result Value Ref Range    Color, Urine Yellow Light-Yellow, Yellow, Dark-Yellow    Appearance, Urine Clear Clear    Specific Gravity, Urine 1.019 1.005 - 1.035    pH, Urine 6.5 5.0, 5.5, 6.0, 6.5, 7.0, 7.5, 8.0    Protein, Urine 100 (2+) (A) NEGATIVE, 10 (TRACE), 20 (TRACE) mg/dL    Glucose, Urine Normal Normal mg/dL    Blood, Urine OVER (3+) (A) NEGATIVE    Ketones, Urine NEGATIVE NEGATIVE mg/dL    Bilirubin, Urine  NEGATIVE NEGATIVE    Urobilinogen, Urine Normal Normal mg/dL    Nitrite, Urine NEGATIVE NEGATIVE    Leukocyte Esterase, Urine 25 Adin/µL (A) NEGATIVE   Microscopic Only, Urine   Result Value Ref Range    WBC, Urine 6-10 (A) 1-5, NONE /HPF    RBC, Urine NONE NONE, 1-2, 3-5 /HPF    Squamous Epithelial Cells, Urine 1-9 (SPARSE) Reference range not established. /HPF    Mucus, Urine FEW Reference range not established. /LPF   Urine Culture    Specimen: Clean Catch/Voided; Urine   Result Value Ref Range    Urine Culture No growth    Basic metabolic panel   Result Value Ref Range    Glucose 127 (H) 65 - 99 mg/dL    Sodium 144 133 - 145 mmol/L    Potassium <1.0 (LL) 3.4 - 5.1 mmol/L    Chloride 103 97 - 107 mmol/L    Bicarbonate 28 24 - 31 mmol/L    Urea Nitrogen 11 8 - 25 mg/dL    Creatinine 0.90 0.40 - 1.60 mg/dL    eGFR >90 >60 mL/min/1.73m*2    Calcium 8.4 (L) 8.5 - 10.4 mg/dL    Anion Gap 13 <=19 mmol/L   Magnesium   Result Value Ref Range    Magnesium 1.80 1.60 - 3.10 mg/dL   Phosphorus   Result Value Ref Range    Phosphorus 1.5 (L) 2.5 - 4.5 mg/dL   Basic metabolic panel   Result Value Ref Range    Glucose 102 (H) 65 - 99 mg/dL    Sodium 145 133 - 145 mmol/L    Potassium <1.5 (LL) 3.4 - 5.1 mmol/L    Chloride 105 97 - 107 mmol/L    Bicarbonate 28 24 - 31 mmol/L    Urea Nitrogen 10 8 - 25 mg/dL    Creatinine 0.90 0.40 - 1.60 mg/dL    eGFR >90 >60 mL/min/1.73m*2    Calcium 8.3 (L) 8.5 - 10.4 mg/dL    Anion Gap 12 <=19 mmol/L   Creatine Kinase   Result Value Ref Range    Creatine Kinase 3,711 (H) 24 - 195 U/L   Urinalysis with Reflex Microscopic   Result Value Ref Range    Color, Urine Light-Yellow Light-Yellow, Yellow, Dark-Yellow    Appearance, Urine Clear Clear    Specific Gravity, Urine 1.032 1.005 - 1.035    pH, Urine 7.0 5.0, 5.5, 6.0, 6.5, 7.0, 7.5, 8.0    Protein, Urine 20 (TRACE) NEGATIVE, 10 (TRACE), 20 (TRACE) mg/dL    Glucose, Urine Normal Normal mg/dL    Blood, Urine 1.0 (3+) (A) NEGATIVE    Ketones, Urine  NEGATIVE NEGATIVE mg/dL    Bilirubin, Urine NEGATIVE NEGATIVE    Urobilinogen, Urine Normal Normal mg/dL    Nitrite, Urine NEGATIVE NEGATIVE    Leukocyte Esterase, Urine NEGATIVE NEGATIVE   Microscopic Only, Urine   Result Value Ref Range    WBC, Urine 1-5 1-5, NONE /HPF    RBC, Urine NONE NONE, 1-2, 3-5 /HPF   NT-PROBNP   Result Value Ref Range    PROBNP     Serial Troponin, Initial ([facility])   Result Value Ref Range    Troponin T, High Sensitivity     Hemoglobin A1C   Result Value Ref Range    Hemoglobin A1C 5.6 See below %    Estimated Average Glucose 114 Not Established mg/dL   Type And Screen   Result Value Ref Range    ABO TYPE B     Rh TYPE POS     ANTIBODY SCREEN NEG    Protime-INR   Result Value Ref Range    Protime 11.2 9.3 - 12.7 seconds    INR 1.1 0.9 - 1.2   Blood Culture    Specimen: Peripheral Venipuncture; Blood culture   Result Value Ref Range    Blood Culture No growth at 1 day    Blood Culture    Specimen: Peripheral Venipuncture; Blood culture   Result Value Ref Range    Blood Culture No growth at 1 day    Abo/Rh Group Test   Result Value Ref Range    ABO TYPE B     Rh TYPE POS    Potassium, Urine Random   Result Value Ref Range    Potassium, Urine Random 13 mmol/L    Creatinine, Urine Random 50.1 NOT ESTABLISHED mg/dL    Potassium/Creatinine Ratio 26 Not established mmol/g Creat   Comprehensive metabolic panel   Result Value Ref Range    Glucose 165 (H) 65 - 99 mg/dL    Sodium 144 133 - 145 mmol/L    Potassium 1.6 (LL) 3.4 - 5.1 mmol/L    Chloride 107 97 - 107 mmol/L    Bicarbonate 26 24 - 31 mmol/L    Urea Nitrogen 9 8 - 25 mg/dL    Creatinine 0.80 0.40 - 1.60 mg/dL    eGFR >90 >60 mL/min/1.73m*2    Calcium 7.6 (L) 8.5 - 10.4 mg/dL    Albumin 2.9 (L) 3.5 - 5.0 g/dL    Alkaline Phosphatase 80 35 - 125 U/L    Total Protein 4.7 (L) 5.9 - 7.9 g/dL     (H) 5 - 40 U/L    Bilirubin, Total 0.5 0.1 - 1.2 mg/dL    ALT 62 (H) 5 - 40 U/L    Anion Gap 11 <=19 mmol/L   Magnesium   Result Value Ref Range     Magnesium 1.70 1.60 - 3.10 mg/dL   Phosphorus   Result Value Ref Range    Phosphorus 1.9 (L) 2.5 - 4.5 mg/dL   Serial Troponin, 2 Hour ([facility])   Result Value Ref Range    Troponin T, High Sensitivity 78 (HH) <=14 ng/L   PST Top   Result Value Ref Range    Extra Tube Hold for add-ons.    Basic Metabolic Panel   Result Value Ref Range    Glucose 94 65 - 99 mg/dL    Sodium 146 (H) 133 - 145 mmol/L    Potassium 1.6 (LL) 3.4 - 5.1 mmol/L    Chloride 109 (H) 97 - 107 mmol/L    Bicarbonate 25 24 - 31 mmol/L    Urea Nitrogen 10 8 - 25 mg/dL    Creatinine 0.80 0.40 - 1.60 mg/dL    eGFR >90 >60 mL/min/1.73m*2    Calcium 7.2 (L) 8.5 - 10.4 mg/dL    Anion Gap 12 <=19 mmol/L   Phosphorus   Result Value Ref Range    Phosphorus 1.7 (L) 2.5 - 4.5 mg/dL   Magnesium   Result Value Ref Range    Magnesium 1.50 (L) 1.60 - 3.10 mg/dL   Serial Troponin, 6 Hour ([facility])   Result Value Ref Range    Troponin T, High Sensitivity 75 (HH) <=14 ng/L   Phosphorus   Result Value Ref Range    Phosphorus 2.3 (L) 2.5 - 4.5 mg/dL   Basic Metabolic Panel   Result Value Ref Range    Glucose 108 (H) 65 - 99 mg/dL    Sodium 144 133 - 145 mmol/L    Potassium 1.8 (LL) 3.4 - 5.1 mmol/L    Chloride 107 97 - 107 mmol/L    Bicarbonate 26 24 - 31 mmol/L    Urea Nitrogen 10 8 - 25 mg/dL    Creatinine 0.80 0.40 - 1.60 mg/dL    eGFR >90 >60 mL/min/1.73m*2    Calcium 7.5 (L) 8.5 - 10.4 mg/dL    Anion Gap 11 <=19 mmol/L   Magnesium   Result Value Ref Range    Magnesium 1.90 1.60 - 3.10 mg/dL   Basic Metabolic Panel   Result Value Ref Range    Glucose 114 (H) 65 - 99 mg/dL    Sodium 145 133 - 145 mmol/L    Potassium 1.7 (LL) 3.4 - 5.1 mmol/L    Chloride 108 (H) 97 - 107 mmol/L    Bicarbonate 25 24 - 31 mmol/L    Urea Nitrogen 9 8 - 25 mg/dL    Creatinine 0.80 0.40 - 1.60 mg/dL    eGFR >90 >60 mL/min/1.73m*2    Calcium 7.2 (L) 8.5 - 10.4 mg/dL    Anion Gap 12 <=19 mmol/L   Phosphorus   Result Value Ref Range    Phosphorus 2.9 2.5 - 4.5 mg/dL   Magnesium   Result Value  Ref Range    Magnesium 1.70 1.60 - 3.10 mg/dL   CBC   Result Value Ref Range    WBC 8.3 4.4 - 11.3 x10*3/uL    nRBC 0.0 0.0 - 0.0 /100 WBCs    RBC 3.69 (L) 4.50 - 5.90 x10*6/uL    Hemoglobin 11.1 (L) 13.5 - 17.5 g/dL    Hematocrit 30.7 (L) 41.0 - 52.0 %    MCV 83 80 - 100 fL    MCH 30.1 26.0 - 34.0 pg    MCHC 36.2 (H) 32.0 - 36.0 g/dL    RDW 15.2 (H) 11.5 - 14.5 %    Platelets 246 150 - 450 x10*3/uL   Comprehensive metabolic panel   Result Value Ref Range    Glucose 112 (H) 65 - 99 mg/dL    Sodium 143 133 - 145 mmol/L    Potassium 1.9 (LL) 3.4 - 5.1 mmol/L    Chloride 107 97 - 107 mmol/L    Bicarbonate 26 24 - 31 mmol/L    Urea Nitrogen 9 8 - 25 mg/dL    Creatinine 0.80 0.40 - 1.60 mg/dL    eGFR >90 >60 mL/min/1.73m*2    Calcium 7.7 (L) 8.5 - 10.4 mg/dL    Albumin 2.8 (L) 3.5 - 5.0 g/dL    Alkaline Phosphatase 78 35 - 125 U/L    Total Protein 5.1 (L) 5.9 - 7.9 g/dL     (H) 5 - 40 U/L    Bilirubin, Total 0.5 0.1 - 1.2 mg/dL    ALT 67 (H) 5 - 40 U/L    Anion Gap 10 <=19 mmol/L   Phosphorus   Result Value Ref Range    Phosphorus 3.1 2.5 - 4.5 mg/dL   Magnesium   Result Value Ref Range    Magnesium 1.90 1.60 - 3.10 mg/dL   Creatine Kinase   Result Value Ref Range    Creatine Kinase 5,486 (H) 24 - 195 U/L   Basic Metabolic Panel   Result Value Ref Range    Glucose 100 (H) 65 - 99 mg/dL    Sodium 147 (H) 133 - 145 mmol/L    Potassium 2.0 (LL) 3.4 - 5.1 mmol/L    Chloride 110 (H) 97 - 107 mmol/L    Bicarbonate 26 24 - 31 mmol/L    Urea Nitrogen 8 8 - 25 mg/dL    Creatinine 0.80 0.40 - 1.60 mg/dL    eGFR >90 >60 mL/min/1.73m*2    Calcium 7.7 (L) 8.5 - 10.4 mg/dL    Anion Gap 11 <=19 mmol/L   Basic Metabolic Panel   Result Value Ref Range    Glucose 117 (H) 65 - 99 mg/dL    Sodium 145 133 - 145 mmol/L    Potassium 2.3 (LL) 3.4 - 5.1 mmol/L    Chloride 110 (H) 97 - 107 mmol/L    Bicarbonate 22 (L) 24 - 31 mmol/L    Urea Nitrogen 7 (L) 8 - 25 mg/dL    Creatinine 0.80 0.40 - 1.60 mg/dL    eGFR >90 >60 mL/min/1.73m*2     Calcium 7.5 (L) 8.5 - 10.4 mg/dL    Anion Gap 13 <=19 mmol/L   Drug Screen, Urine   Result Value Ref Range    Amphetamine Screen, Urine Negative      Barbiturate Screen, Urine Negative      Benzodiazepines Screen, Urine Negative      Cannabinoid Screen, Urine Negative      Cocaine Metabolite Screen, Urine Negative      Fentanyl Screen, Urine Negative       Methadone Screen, Urine Negative      Opiate Screen, Urine Positive (A)      Oxycodone Screen, Urine Positive (A)      PCP Screen, Urine Negative     Basic Metabolic Panel   Result Value Ref Range    Glucose 128 (H) 65 - 99 mg/dL    Sodium 146 (H) 133 - 145 mmol/L    Potassium 1.9 (LL) 3.4 - 5.1 mmol/L    Chloride 111 (H) 97 - 107 mmol/L    Bicarbonate 24 24 - 31 mmol/L    Urea Nitrogen 7 (L) 8 - 25 mg/dL    Creatinine 0.80 0.40 - 1.60 mg/dL    eGFR >90 >60 mL/min/1.73m*2    Calcium 7.3 (L) 8.5 - 10.4 mg/dL    Anion Gap 11 <=19 mmol/L   Basic Metabolic Panel   Result Value Ref Range    Glucose 120 (H) 65 - 99 mg/dL    Sodium 145 133 - 145 mmol/L    Potassium 1.9 (LL) 3.4 - 5.1 mmol/L    Chloride 108 (H) 97 - 107 mmol/L    Bicarbonate 25 24 - 31 mmol/L    Urea Nitrogen 7 (L) 8 - 25 mg/dL    Creatinine 0.80 0.40 - 1.60 mg/dL    eGFR >90 >60 mL/min/1.73m*2    Calcium 7.7 (L) 8.5 - 10.4 mg/dL    Anion Gap 12 <=19 mmol/L   C-Reactive Protein, High Sensitivity   Result Value Ref Range    CRP, High Sensitivity 8.2 (H) <1.0 mg/L   Basic Metabolic Panel   Result Value Ref Range    Glucose 145 (H) 65 - 99 mg/dL    Sodium 146 (H) 133 - 145 mmol/L    Potassium 2.0 (LL) 3.4 - 5.1 mmol/L    Chloride 110 (H) 97 - 107 mmol/L    Bicarbonate 24 24 - 31 mmol/L    Urea Nitrogen 6 (L) 8 - 25 mg/dL    Creatinine 0.90 0.40 - 1.60 mg/dL    eGFR >90 >60 mL/min/1.73m*2    Calcium 7.7 (L) 8.5 - 10.4 mg/dL    Anion Gap 12 <=19 mmol/L   Carcinoembryonic Antigen   Result Value Ref Range    Carcinoembryonic AG 1.4 ug/L   Transthoracic Echo (TTE) Complete   Result Value Ref Range    AV pk azar  1.52 m/s    LVOT diam 2.00 cm    AV mn grad 5.0 mmHg    MV E/A ratio 1.37     Tricuspid annular plane systolic excursion 2.9 cm    LA vol index A/L 17.6 ml/m2    LV EF 58 %    RV free wall pk S' 13.50 cm/s    LVIDd 4.58 cm    AV pk grad 9.2 mmHg    Aortic Valve Area by Continuity of VTI 2.57 cm2    Aortic Valve Area by Continuity of Peak Velocity 2.69 cm2    LV A4C EF 50.6    Basic Metabolic Panel   Result Value Ref Range    Glucose 155 (H) 65 - 99 mg/dL    Sodium 146 (H) 133 - 145 mmol/L    Potassium 2.0 (LL) 3.4 - 5.1 mmol/L    Chloride 109 (H) 97 - 107 mmol/L    Bicarbonate 24 24 - 31 mmol/L    Urea Nitrogen 6 (L) 8 - 25 mg/dL    Creatinine 0.80 0.40 - 1.60 mg/dL    eGFR >90 >60 mL/min/1.73m*2    Calcium 7.8 (L) 8.5 - 10.4 mg/dL    Anion Gap 13 <=19 mmol/L   Basic Metabolic Panel   Result Value Ref Range    Glucose 135 (H) 65 - 99 mg/dL    Sodium 145 133 - 145 mmol/L    Potassium 2.0 (LL) 3.4 - 5.1 mmol/L    Chloride 108 (H) 97 - 107 mmol/L    Bicarbonate 24 24 - 31 mmol/L    Urea Nitrogen 5 (L) 8 - 25 mg/dL    Creatinine 0.80 0.40 - 1.60 mg/dL    eGFR >90 >60 mL/min/1.73m*2    Calcium 7.9 (L) 8.5 - 10.4 mg/dL    Anion Gap 13 <=19 mmol/L   Basic metabolic panel   Result Value Ref Range    Glucose 128 (H) 65 - 99 mg/dL    Sodium 144 133 - 145 mmol/L    Potassium 2.2 (LL) 3.4 - 5.1 mmol/L    Chloride 108 (H) 97 - 107 mmol/L    Bicarbonate 24 24 - 31 mmol/L    Urea Nitrogen 5 (L) 8 - 25 mg/dL    Creatinine 0.70 0.40 - 1.60 mg/dL    eGFR >90 >60 mL/min/1.73m*2    Calcium 7.6 (L) 8.5 - 10.4 mg/dL    Anion Gap 12 <=19 mmol/L   Basic Metabolic Panel   Result Value Ref Range    Glucose 128 (H) 65 - 99 mg/dL    Sodium 147 (H) 133 - 145 mmol/L    Potassium 2.3 (LL) 3.4 - 5.1 mmol/L    Chloride 112 (H) 97 - 107 mmol/L    Bicarbonate 24 24 - 31 mmol/L    Urea Nitrogen 4 (L) 8 - 25 mg/dL    Creatinine 0.70 0.40 - 1.60 mg/dL    eGFR >90 >60 mL/min/1.73m*2    Calcium 7.5 (L) 8.5 - 10.4 mg/dL    Anion Gap 11 <=19 mmol/L    Creatine Kinase   Result Value Ref Range    Creatine Kinase 10,923 (H) 24 - 195 U/L   CBC and Auto Differential   Result Value Ref Range    WBC 7.6 4.4 - 11.3 x10*3/uL    nRBC 0.0 0.0 - 0.0 /100 WBCs    RBC 3.52 (L) 4.50 - 5.90 x10*6/uL    Hemoglobin 10.6 (L) 13.5 - 17.5 g/dL    Hematocrit 30.2 (L) 41.0 - 52.0 %    MCV 86 80 - 100 fL    MCH 30.1 26.0 - 34.0 pg    MCHC 35.1 32.0 - 36.0 g/dL    RDW 15.7 (H) 11.5 - 14.5 %    Platelets 237 150 - 450 x10*3/uL    Neutrophils % 56.9 40.0 - 80.0 %    Immature Granulocytes %, Automated 0.3 0.0 - 0.9 %    Lymphocytes % 33.3 13.0 - 44.0 %    Monocytes % 6.9 2.0 - 10.0 %    Eosinophils % 2.1 0.0 - 6.0 %    Basophils % 0.5 0.0 - 2.0 %    Neutrophils Absolute 4.34 1.20 - 7.70 x10*3/uL    Immature Granulocytes Absolute, Automated 0.02 0.00 - 0.70 x10*3/uL    Lymphocytes Absolute 2.54 1.20 - 4.80 x10*3/uL    Monocytes Absolute 0.53 0.10 - 1.00 x10*3/uL    Eosinophils Absolute 0.16 0.00 - 0.70 x10*3/uL    Basophils Absolute 0.04 0.00 - 0.10 x10*3/uL   Comprehensive Metabolic Panel   Result Value Ref Range    Glucose 102 (H) 65 - 99 mg/dL    Sodium 146 (H) 133 - 145 mmol/L    Potassium 2.4 (LL) 3.4 - 5.1 mmol/L    Chloride 112 (H) 97 - 107 mmol/L    Bicarbonate 24 24 - 31 mmol/L    Urea Nitrogen 4 (L) 8 - 25 mg/dL    Creatinine 0.80 0.40 - 1.60 mg/dL    eGFR >90 >60 mL/min/1.73m*2    Calcium 7.4 (L) 8.5 - 10.4 mg/dL    Albumin 2.8 (L) 3.5 - 5.0 g/dL    Alkaline Phosphatase 72 35 - 125 U/L    Total Protein 4.5 (L) 5.9 - 7.9 g/dL     (H) 5 - 40 U/L    Bilirubin, Total 0.5 0.1 - 1.2 mg/dL    ALT 87 (H) 5 - 40 U/L    Anion Gap 10 <=19 mmol/L   Magnesium   Result Value Ref Range    Magnesium 1.60 1.60 - 3.10 mg/dL   Phosphorus   Result Value Ref Range    Phosphorus 2.6 2.5 - 4.5 mg/dL   Basic Metabolic Panel   Result Value Ref Range    Glucose 102 (H) 65 - 99 mg/dL    Sodium 146 (H) 133 - 145 mmol/L    Potassium 2.4 (LL) 3.4 - 5.1 mmol/L    Chloride 112 (H) 97 - 107 mmol/L     Bicarbonate 24 24 - 31 mmol/L    Urea Nitrogen 4 (L) 8 - 25 mg/dL    Creatinine 0.80 0.40 - 1.60 mg/dL    eGFR >90 >60 mL/min/1.73m*2    Calcium 7.4 (L) 8.5 - 10.4 mg/dL    Anion Gap 10 <=19 mmol/L   Sedimentation Rate   Result Value Ref Range    Sedimentation Rate 6 0 - 15 mm/h   Basic Metabolic Panel   Result Value Ref Range    Glucose 95 65 - 99 mg/dL    Sodium 148 (H) 133 - 145 mmol/L    Potassium 2.8 (LL) 3.4 - 5.1 mmol/L    Chloride 113 (H) 97 - 107 mmol/L    Bicarbonate 24 24 - 31 mmol/L    Urea Nitrogen 3 (L) 8 - 25 mg/dL    Creatinine 0.80 0.40 - 1.60 mg/dL    eGFR >90 >60 mL/min/1.73m*2    Calcium 7.3 (L) 8.5 - 10.4 mg/dL    Anion Gap 11 <=19 mmol/L       Assessment/Plan   Profound hypokalemia etiology is not clear at this time however I have a very high suspicion of adrenal etiology especially with his persistent history of hypertension and hypokalemia certainly primary hyperaldosteronism is a leading diagnosis however patient also 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 his mother does have a history of hypertension his potassium is slowly improving he is up to 2.8 this morning and he is receiving another bag of IV potassium chloride at this time we will continue to monitor potassium very closely  Hypertension rule out secondary form of hypertension from adrenal etiology  Acute rhabdomyolysis secondary to hypokalemia and the use of statins his CPK continue to rise we will continue IV hydration will change his IV fluids from normal saline to half-normal saline because of developing mild hypernatremia  Chronic pain  Hypophosphatemia  it is resolved  Hypomagnesemia magnesium is 1.6 slightly low we will give him 2 g of mag sulfate intravenously   hypocalcemia which is symptomatic we will give him 2 g of calcium gluconate and repeat another calcium after that     discussed with the family in details       Ward Garcia MD

## 2024-09-12 NOTE — PROGRESS NOTES
Occupational Therapy                 Therapy Communication Note    Patient Name: Bereket Schaefer  MRN: [id number]  Department: [facility]  Room: 410/410-A  Today's Date: 9/12/2024     Discipline: Occupational Therapy    Missed Visit Reason: Missed Visit Reason: Patient placed on medical hold    Missed Time: Cancel    Comment: 9:11 this AM; bedside RN reported patient is medically not appropriate for OT. Patient with low potassium and low calcium, requiring stat replacement.

## 2024-09-13 LAB
ALBUMIN SERPL-MCNC: 2.7 G/DL (ref 3.5–5)
ALDOLASE SERPL-CCNC: 49 U/L (ref 1.2–7.6)
ALP BLD-CCNC: 73 U/L (ref 35–125)
ALT SERPL-CCNC: 109 U/L (ref 5–40)
ANION GAP SERPL CALC-SCNC: 9 MMOL/L
ANION GAP SERPL CALC-SCNC: 9 MMOL/L
AST SERPL-CCNC: 284 U/L (ref 5–40)
BASOPHILS # BLD AUTO: 0.04 X10*3/UL (ref 0–0.1)
BASOPHILS NFR BLD AUTO: 0.6 %
BILIRUB SERPL-MCNC: 0.5 MG/DL (ref 0.1–1.2)
BUN SERPL-MCNC: <3 MG/DL (ref 8–25)
BUN SERPL-MCNC: <3 MG/DL (ref 8–25)
CALCIUM SERPL-MCNC: 7.6 MG/DL (ref 8.5–10.4)
CALCIUM SERPL-MCNC: 8.2 MG/DL (ref 8.5–10.4)
CHLORIDE SERPL-SCNC: 110 MMOL/L (ref 97–107)
CHLORIDE SERPL-SCNC: 112 MMOL/L (ref 97–107)
CK SERPL-CCNC: ABNORMAL U/L (ref 24–195)
CO2 SERPL-SCNC: 23 MMOL/L (ref 24–31)
CO2 SERPL-SCNC: 24 MMOL/L (ref 24–31)
CREAT SERPL-MCNC: 0.7 MG/DL (ref 0.4–1.6)
CREAT SERPL-MCNC: 0.7 MG/DL (ref 0.4–1.6)
EGFRCR SERPLBLD CKD-EPI 2021: >90 ML/MIN/1.73M*2
EGFRCR SERPLBLD CKD-EPI 2021: >90 ML/MIN/1.73M*2
EOSINOPHIL # BLD AUTO: 0.17 X10*3/UL (ref 0–0.7)
EOSINOPHIL NFR BLD AUTO: 2.4 %
ERYTHROCYTE [DISTWIDTH] IN BLOOD BY AUTOMATED COUNT: 15.7 % (ref 11.5–14.5)
GLUCOSE SERPL-MCNC: 122 MG/DL (ref 65–99)
GLUCOSE SERPL-MCNC: 87 MG/DL (ref 65–99)
HCT VFR BLD AUTO: 30.8 % (ref 41–52)
HGB BLD-MCNC: 10.7 G/DL (ref 13.5–17.5)
IMM GRANULOCYTES # BLD AUTO: 0.03 X10*3/UL (ref 0–0.7)
IMM GRANULOCYTES NFR BLD AUTO: 0.4 % (ref 0–0.9)
LYMPHOCYTES # BLD AUTO: 2.33 X10*3/UL (ref 1.2–4.8)
LYMPHOCYTES NFR BLD AUTO: 33.5 %
MAGNESIUM SERPL-MCNC: 1.6 MG/DL (ref 1.6–3.1)
MCH RBC QN AUTO: 30.3 PG (ref 26–34)
MCHC RBC AUTO-ENTMCNC: 34.7 G/DL (ref 32–36)
MCV RBC AUTO: 87 FL (ref 80–100)
MONOCYTES # BLD AUTO: 0.38 X10*3/UL (ref 0.1–1)
MONOCYTES NFR BLD AUTO: 5.5 %
NEUTROPHILS # BLD AUTO: 4.01 X10*3/UL (ref 1.2–7.7)
NEUTROPHILS NFR BLD AUTO: 57.6 %
NRBC BLD-RTO: 0 /100 WBCS (ref 0–0)
PHOSPHATE SERPL-MCNC: 2.6 MG/DL (ref 2.5–4.5)
PLATELET # BLD AUTO: 235 X10*3/UL (ref 150–450)
POTASSIUM SERPL-SCNC: 3 MMOL/L (ref 3.4–5.1)
POTASSIUM SERPL-SCNC: 3.6 MMOL/L (ref 3.4–5.1)
POTASSIUM SERPL-SCNC: 4 MMOL/L (ref 3.4–5.1)
PROT SERPL-MCNC: 4.6 G/DL (ref 5.9–7.9)
RBC # BLD AUTO: 3.53 X10*6/UL (ref 4.5–5.9)
SODIUM SERPL-SCNC: 143 MMOL/L (ref 133–145)
SODIUM SERPL-SCNC: 144 MMOL/L (ref 133–145)
WBC # BLD AUTO: 7 X10*3/UL (ref 4.4–11.3)

## 2024-09-13 PROCEDURE — 84100 ASSAY OF PHOSPHORUS: CPT | Performed by: INTERNAL MEDICINE

## 2024-09-13 PROCEDURE — 2500000004 HC RX 250 GENERAL PHARMACY W/ HCPCS (ALT 636 FOR OP/ED): Performed by: INTERNAL MEDICINE

## 2024-09-13 PROCEDURE — 97165 OT EVAL LOW COMPLEX 30 MIN: CPT | Mod: GO

## 2024-09-13 PROCEDURE — 2500000001 HC RX 250 WO HCPCS SELF ADMINISTERED DRUGS (ALT 637 FOR MEDICARE OP): Performed by: REGISTERED NURSE

## 2024-09-13 PROCEDURE — 2500000001 HC RX 250 WO HCPCS SELF ADMINISTERED DRUGS (ALT 637 FOR MEDICARE OP): Performed by: INTERNAL MEDICINE

## 2024-09-13 PROCEDURE — 83735 ASSAY OF MAGNESIUM: CPT | Performed by: INTERNAL MEDICINE

## 2024-09-13 PROCEDURE — 84132 ASSAY OF SERUM POTASSIUM: CPT | Performed by: INTERNAL MEDICINE

## 2024-09-13 PROCEDURE — 97161 PT EVAL LOW COMPLEX 20 MIN: CPT | Mod: GP

## 2024-09-13 PROCEDURE — 85025 COMPLETE CBC W/AUTO DIFF WBC: CPT | Performed by: INTERNAL MEDICINE

## 2024-09-13 PROCEDURE — 99232 SBSQ HOSP IP/OBS MODERATE 35: CPT | Performed by: INTERNAL MEDICINE

## 2024-09-13 PROCEDURE — 2500000004 HC RX 250 GENERAL PHARMACY W/ HCPCS (ALT 636 FOR OP/ED): Performed by: REGISTERED NURSE

## 2024-09-13 PROCEDURE — 2020000001 HC ICU ROOM DAILY

## 2024-09-13 PROCEDURE — S4991 NICOTINE PATCH NONLEGEND: HCPCS | Performed by: INTERNAL MEDICINE

## 2024-09-13 PROCEDURE — 80053 COMPREHEN METABOLIC PANEL: CPT | Performed by: INTERNAL MEDICINE

## 2024-09-13 PROCEDURE — 2500000002 HC RX 250 W HCPCS SELF ADMINISTERED DRUGS (ALT 637 FOR MEDICARE OP, ALT 636 FOR OP/ED): Performed by: INTERNAL MEDICINE

## 2024-09-13 PROCEDURE — 82550 ASSAY OF CK (CPK): CPT | Performed by: INTERNAL MEDICINE

## 2024-09-13 RX ORDER — POTASSIUM CHLORIDE 20 MEQ/1
40 TABLET, EXTENDED RELEASE ORAL EVERY 4 HOURS
Status: DISCONTINUED | OUTPATIENT
Start: 2024-09-13 | End: 2024-09-13

## 2024-09-13 RX ORDER — CALCIUM GLUCONATE 98 MG/ML
1 INJECTION, SOLUTION INTRAVENOUS ONCE
Status: DISCONTINUED | OUTPATIENT
Start: 2024-09-13 | End: 2024-09-13

## 2024-09-13 RX ORDER — CALCIUM GLUCONATE 20 MG/ML
1 INJECTION, SOLUTION INTRAVENOUS ONCE
Status: COMPLETED | OUTPATIENT
Start: 2024-09-13 | End: 2024-09-13

## 2024-09-13 RX ORDER — POTASSIUM CHLORIDE 29.8 MG/ML
40 INJECTION INTRAVENOUS ONCE
Status: COMPLETED | OUTPATIENT
Start: 2024-09-13 | End: 2024-09-13

## 2024-09-13 RX ORDER — POTASSIUM CHLORIDE 1.5 G/1.58G
40 POWDER, FOR SOLUTION ORAL DAILY
Status: DISCONTINUED | OUTPATIENT
Start: 2024-09-13 | End: 2024-09-13

## 2024-09-13 ASSESSMENT — COGNITIVE AND FUNCTIONAL STATUS - GENERAL
DAILY ACTIVITIY SCORE: 22
CLIMB 3 TO 5 STEPS WITH RAILING: TOTAL
DAILY ACTIVITIY SCORE: 24
WALKING IN HOSPITAL ROOM: A LITTLE
DRESSING REGULAR LOWER BODY CLOTHING: A LITTLE
MOBILITY SCORE: 20
HELP NEEDED FOR BATHING: A LITTLE
MOBILITY SCORE: 21
CLIMB 3 TO 5 STEPS WITH RAILING: A LOT
MOVING TO AND FROM BED TO CHAIR: A LITTLE

## 2024-09-13 ASSESSMENT — PAIN - FUNCTIONAL ASSESSMENT
PAIN_FUNCTIONAL_ASSESSMENT: 0-10

## 2024-09-13 ASSESSMENT — ACTIVITIES OF DAILY LIVING (ADL)
ADL_ASSISTANCE: INDEPENDENT
BATHING_ASSISTANCE: INDEPENDENT
EFFECT OF PAIN ON DAILY ACTIVITIES: DECREASED ACTIVITY
ADL_ASSISTANCE: INDEPENDENT

## 2024-09-13 ASSESSMENT — PAIN DESCRIPTION - LOCATION
LOCATION: BACK
LOCATION: BACK

## 2024-09-13 ASSESSMENT — PAIN SCALES - GENERAL
PAINLEVEL_OUTOF10: 4
PAINLEVEL_OUTOF10: 5 - MODERATE PAIN
PAINLEVEL_OUTOF10: 7
PAINLEVEL_OUTOF10: 4
PAINLEVEL_OUTOF10: 8
PAINLEVEL_OUTOF10: 8
PAINLEVEL_OUTOF10: 7
PAINLEVEL_OUTOF10: 2
PAINLEVEL_OUTOF10: 8
PAINLEVEL_OUTOF10: 3
PAINLEVEL_OUTOF10: 2
PAINLEVEL_OUTOF10: 8
PAINLEVEL_OUTOF10: 2

## 2024-09-13 ASSESSMENT — PAIN DESCRIPTION - ORIENTATION
ORIENTATION: LOWER
ORIENTATION: LOWER

## 2024-09-13 ASSESSMENT — PAIN DESCRIPTION - DESCRIPTORS
DESCRIPTORS: ACHING

## 2024-09-13 NOTE — PROGRESS NOTES
Subjective Data:      Overnight Events:         Objective Data:  Last Recorded Vitals:  Vitals:    09/13/24 1100 09/13/24 1200 09/13/24 1300 09/13/24 1400   BP: (!) 134/119 139/83 122/78 138/81   BP Location: Left arm      Patient Position: Sitting      Pulse: 76 65 55 64   Resp: 16 24 21 12   Temp: 36.7 °C (98.1 °F)      TempSrc: Temporal      SpO2: 98% 97% 96% 94%   Weight:       Height:           Last Labs:  CBC - 9/13/2024:  4:32 AM  7.0 10.7 235    30.8      CMP - 9/13/2024:  4:32 AM  7.6 4.6 284 --- 0.5   2.6 2.7 109 73      PTT - No results in last year.  1.1   11.2 _     HGBA1C   Date/Time Value Ref Range Status   09/10/2024 05:17 PM 5.6 See below % Final     VLDL   Date/Time Value Ref Range Status   11/08/2018 05:37 AM 37 0 - 40 mg/dL Final      Last I/O:  I/O last 3 completed shifts:  In: 5114.2 (62.9 mL/kg) [P.O.:320; I.V.:4044.2 (49.7 mL/kg); IV Piggyback:750]  Out: 1900 (23.4 mL/kg) [Urine:1900 (0.6 mL/kg/hr)]  Weight: 81.3 kg     Past Cardiology Tests (Last 3 Years):  EKG:  No results found for this or any previous visit from the past 1095 days.    Echo:  Transthoracic Echo (TTE) Complete 09/11/2024    Ejection Fractions:  EF   Date/Time Value Ref Range Status   09/11/2024 03:05 PM 58 %      Cath:  No results found for this or any previous visit from the past 1095 days.    Stress Test:  No results found for this or any previous visit from the past 1095 days.    Cardiac Imaging:  No results found for this or any previous visit from the past 1095 days.      Inpatient Medications:  Scheduled medications   Medication Dose Route Frequency    enoxaparin  40 mg subcutaneous q24h    escitalopram  10 mg oral Daily    lidocaine  5 mL infiltration Once    nicotine  1 patch transdermal Daily    pantoprazole  40 mg oral Daily before breakfast    Or    pantoprazole  40 mg intravenous Daily before breakfast    piperacillin-tazobactam  3.375 g intravenous q6h    potassium chloride  40 mEq oral Daily     PRN  medications   Medication    acetaminophen    Or    acetaminophen    Or    acetaminophen    alteplase    diphenhydrAMINE    HYDROmorphone    ketorolac    oxyCODONE     Continuous Medications   Medication Dose Last Rate    sodium chloride  125 mL/hr 125 mL/hr (09/13/24 0420)       Physical Exam:  The patient is a fairly well-appearing bearded middle-aged white male lying in bed visiting with wife.  He is awake alert conversant not short of breath no overt painful distress  JVP not elevated carotid impulses 2+  Chest fair air movement breath sounds are clear  The cardiac rhythm is regular no premature beats S1-S2 normal no gallop murmur rub  Abdomen soft and benign  No peripheral edema pedal pulses are present.     Assessment/Plan   9/11: The patient is a 48-year-old white male whose past history includes hypertension treated with amlodipine lisinopril no diuretic, hyperlipidemia managed with atorvastatin 20 mg daily, muscular low back pain for which he been on baclofen. Patient admitted with extreme generalized muscle weakness and profound hypokalemia with a potassium of less than 1.0 along with elevated CPK. Etiology of the CK elevation at this point not related to any memorable muscular trauma. Myositis related to adverse reaction to medication being considered atorvastatin and baclofen have been discontinued. Patient also conceivably could have polymyositis or is a related connective tissue disorder and will check CRP sedimentation rate and FRENCH plus and aldolase. The hypokalemia is of uncertain etiology the possibility of hyperaldosteronism is being evaluated with serum aldosterone renin levels pending. No adrenal mass on the abdominal CT. Fortunately no arrhythmias related to the hypokalemia will continue telemetry monitor check echocardiogram.      9/12: The patient was seen and events reviewed.  Patient evidently had an episode of muscular spasm and tetany earlier this morning.  The patient's CBC includes  hematocrit of 30.2 and a WBC of 7600.  Comprehensive metabolic panel still notable for hypokalemia 2.4 with a calcium of 7.4.  The serum sodium has risen to 146 and his IV fluids have been switched to half-normal saline.  Patient had a magnesium level 1.60.  Currently receiving2 g of IV calcium gluconate and 2 g of supplemental magnesium sulfate along with ongoing oral potassium supplementation.  Serum aldosterone and renin levels are pending.  Sedimentation rate is only 6.  CRP is modestly elevated at 8.2 aldolase pending.  Somewhat surprisingly his CK is risen further to 10,923.  Etiology of the electrolyte imbalance still uncertain question potassium wasting nephropathy.  Hyperaldosterone evaluation in progress but the abdominal CT did not show any evidence of an adrenal adenoma or mass.  Statin therapy has been discontinued along with baclofen to rule out medication related myositis.  Will check an H MG Co. a reductase inhibitor antibody level.  The patient's echocardiogram was unremarkable and demonstrated a preserved LV ejection fraction at 55 to 60%.  Events reviewed in detail with patient and family.    9/13: The patient is feeling significantly improved sitting in bedside chair ambulating without any significant muscle weakness or cramping.  The patient's CK remains elevated at 11,111 despite the improvement in electrolytes.  And HMG Co. a reductase inhibitor antibody was sent patient will not return to statin therapy.  If the patient's CK values remain elevated potentially would consider the possibility of a muscle biopsy.  Aldolase value is pending as is FRENCH.  Serum renin and aldosterone levels are also pending.  Patient's potassium is actually improved to 3.6 creatinine is stable at 0.70.  Calcium remains at 7.6.  The patient does remain on a continuous half-normal saline IV infusion.  No arrhythmias by telemetry monitor    PICC - Adult 09/11/24 Double lumen Right Brachial vein (Active)   Site Assessment  Clean;Dry;Intact 09/13/24 0841   Dressing Status Clean;Dry;Occlusive 09/13/24 0841   Number of days: 2       Peripheral IV 09/10/24 20 G Right;Anterior Forearm (Active)   Site Assessment Dry;Clean;Intact 09/13/24 0841   Dressing Status Clean;Dry 09/13/24 0841   Number of days: 3       Code Status:  Full Code    I spent  minutes in the professional and overall care of this patient.        Joe Padilla MD

## 2024-09-13 NOTE — PROGRESS NOTES
09/13/24 1100   Discharge Planning   Home or Post Acute Services None   Expected Discharge Disposition Home   Does the patient need discharge transport arranged? No

## 2024-09-13 NOTE — PROGRESS NOTES
Bereket Schaefer is a 48 y.o. male on day 3 of admission presenting with Hypokalemia.      Subjective   No abdominal pain hungry  Did have some muscle spasm last night but none since then  Muscle pain overall is improving       Objective     Last Recorded Vitals  BP (!) 159/96 (BP Location: Left arm, Patient Position: Lying)   Pulse 67   Temp 36.6 °C (97.9 °F) (Temporal)   Resp 21   Wt 80.7 kg (177 lb 14.6 oz)   SpO2 96%   Intake/Output last 3 Shifts:    Intake/Output Summary (Last 24 hours) at 9/13/2024 0939  Last data filed at 9/13/2024 0906  Gross per 24 hour   Intake 4669.15 ml   Output 1075 ml   Net 3594.15 ml       Physical Exam  Alert oriented x 3 cooperative  Normocephalic/atraumatic EOMI PERRLA  Neck supple  Chest clear  Heart regular  Abdomen benign nontender  Extremities no edema soft muscle compartments good pulses  Neurologic exam focal  Relevant Results  Reviewed  Assessment/Plan     Principal Problem:    Hypokalemia      September 13      Profound hypokalemia suspected hypoaldosteronism as it is associated with hypertension, labs in progress; finally potassium is above 3 continue to monitor less frequently now, every 6 hours continue correction, will give 40 p.o. twice 4 hours apart +40 IV for his most recent potassium of 3.0; 9:30 AM potassium value drawn just now is still pending  Rhabdomyolysis likely induced by profound hypokalemia seems to be plateauing  Questionable cholecystitis HIDA scan was negative discussed with Dr. Isaacs who is okay to resume diet and she has no surgical plans and likely sign off.  Anyway we will keep on empiric Zosyn for another couple days  Possibly thickened cecum per initial CT GI recommendations reviewed will need outpatient colonoscopy  Hypertension  Chronic pain  Hypocalcemia with episodes of tetany response to intravenous calcium currently improved           Ayde Lorenzo MD

## 2024-09-13 NOTE — PROGRESS NOTES
Bereket Schaefer is a 48 y.o. male on day 3 of admission presenting with Hypokalemia.    Subjective    patient was seen for profound hypokalemia with a potassium less than 1 he has been getting both IV infusions of potassium chloride as well as acute rhabdomyolysis patient has no more spasm in his hands and he feels a whole lot better no abdominal pain nausea vomiting diarrhea he had a HIDA scan for rule out cholecystitis was negative       Objective     Physical Exam  Constitutional:       Comments:  patient is moderate distress because of the pain in his hands because of the spasm   Neck:      Vascular: No carotid bruit.   Cardiovascular:      Rate and Rhythm: Normal rate and regular rhythm.      Heart sounds: No murmur heard.     No friction rub. No gallop.   Pulmonary:      Breath sounds: No wheezing, rhonchi or rales.   Chest:      Chest wall: No tenderness.   Abdominal:      General: There is no distension.      Tenderness: There is no abdominal tenderness. There is no guarding or rebound.   Musculoskeletal:         General: No swelling or tenderness.      Cervical back: Neck supple.      Right lower leg: No edema.      Left lower leg: No edema.      Comments:  patient does have  shvostsign in both hands with severe cramping   Lymphadenopathy:      Cervical: No cervical adenopathy.         Last Recorded Vitals  Blood pressure (!) 134/119, pulse 67, temperature 36.7 °C (98.1 °F), temperature source Temporal, resp. rate 21, height 1.829 m (6'), weight 80.7 kg (177 lb 14.6 oz), SpO2 96%.    Intake/Output last 3 Shifts:  I/O last 3 completed shifts:  In: 5114.2 (62.9 mL/kg) [P.O.:320; I.V.:4044.2 (49.7 mL/kg); IV Piggyback:750]  Out: 1900 (23.4 mL/kg) [Urine:1900 (0.6 mL/kg/hr)]  Weight: 81.3 kg     Current Facility-Administered Medications:     acetaminophen (Tylenol) tablet 650 mg, 650 mg, oral, q4h PRN **OR** acetaminophen (Tylenol) oral liquid 650 mg, 650 mg, oral, q4h PRN **OR** acetaminophen (Tylenol) suppository  650 mg, 650 mg, rectal, q4h PRN, Ayde Lorenzo MD    alteplase (Cathflo Activase) injection 2 mg, 2 mg, intra-catheter, PRN, Ayde Lorenzo MD    diphenhydrAMINE (Sominex) tablet 25 mg, 25 mg, oral, q6h PRN, SURYA Ornelas-CNP, 25 mg at 09/12/24 2300    enoxaparin (Lovenox) syringe 40 mg, 40 mg, subcutaneous, q24h, Ayde Lorenzo MD, 40 mg at 09/12/24 2300    escitalopram (Lexapro) tablet 10 mg, 10 mg, oral, Daily, Ayde Lorenzo MD, 10 mg at 09/12/24 2300    HYDROmorphone (Dilaudid) injection 1 mg, 1 mg, intravenous, q4h PRN, Ayde Lorenzo MD, 1 mg at 09/12/24 2300    ketorolac (Toradol) injection 30 mg, 30 mg, intravenous, q6h PRN, SURYA Ornelas-CNP, 30 mg at 09/13/24 1015    lidocaine (Xylocaine) 10 mg/mL (1 %) injection 5 mL, 5 mL, infiltration, Once, Ayde Lorenzo MD    nicotine (Nicoderm CQ) 21 mg/24 hr patch 1 patch, 1 patch, transdermal, Daily, Ayde Lorenzo MD, 1 patch at 09/13/24 0800    oxyCODONE (Roxicodone) immediate release tablet 5 mg, 5 mg, oral, q6h PRN, Ayde Lorenzo MD, 5 mg at 09/13/24 1135    pantoprazole (ProtoNix) EC tablet 40 mg, 40 mg, oral, Daily before breakfast, 40 mg at 09/11/24 0603 **OR** pantoprazole (ProtoNix) injection 40 mg, 40 mg, intravenous, Daily before breakfast, Ayde Lorenzo MD, 40 mg at 09/13/24 0602    piperacillin-tazobactam (Zosyn) 3.375 g in dextrose (iso) IV 50 mL, 3.375 g, intravenous, q6h, Ayde Lorenzo MD, Last Rate: 0 mL/hr at 09/13/24 0536, 3.375 g at 09/13/24 1135    potassium chloride (Klor-Con) packet 40 mEq, 40 mEq, oral, Once, Ward Garcia MD    potassium chloride 40 mEq in sterile water for injection 100 mL, 40 mEq, intravenous, Once, Ayde Lorenzo MD, Last Rate: 25 mL/hr at 09/13/24 1012, 40 mEq at 09/13/24 1012    potassium chloride CR (Klor-Con M20) ER tablet 40 mEq, 40 mEq, oral, q4h, Ayde Lorenzo MD, 40 mEq at 09/13/24 1012     sodium chloride 0.45 % infusion, 125 mL/hr, intravenous, Continuous, Ayde Lorenzo MD, Last Rate: 125 mL/hr at 09/13/24 0420, 125 mL/hr at 09/13/24 0420   Relevant Results    Results for orders placed or performed during the hospital encounter of 09/10/24 (from the past 96 hour(s))   CBC and Auto Differential   Result Value Ref Range    WBC 10.0 4.4 - 11.3 x10*3/uL    nRBC 0.0 0.0 - 0.0 /100 WBCs    RBC 4.96 4.50 - 5.90 x10*6/uL    Hemoglobin 15.0 13.5 - 17.5 g/dL    Hematocrit 41.3 41.0 - 52.0 %    MCV 83 80 - 100 fL    MCH 30.2 26.0 - 34.0 pg    MCHC 36.3 (H) 32.0 - 36.0 g/dL    RDW 14.9 (H) 11.5 - 14.5 %    Platelets 365 150 - 450 x10*3/uL    Neutrophils % 64.6 40.0 - 80.0 %    Immature Granulocytes %, Automated 0.3 0.0 - 0.9 %    Lymphocytes % 24.3 13.0 - 44.0 %    Monocytes % 8.9 2.0 - 10.0 %    Eosinophils % 1.4 0.0 - 6.0 %    Basophils % 0.5 0.0 - 2.0 %    Neutrophils Absolute 6.45 1.20 - 7.70 x10*3/uL    Immature Granulocytes Absolute, Automated 0.03 0.00 - 0.70 x10*3/uL    Lymphocytes Absolute 2.43 1.20 - 4.80 x10*3/uL    Monocytes Absolute 0.89 0.10 - 1.00 x10*3/uL    Eosinophils Absolute 0.14 0.00 - 0.70 x10*3/uL    Basophils Absolute 0.05 0.00 - 0.10 x10*3/uL   Sars-CoV-2 PCR   Result Value Ref Range    Coronavirus 2019, PCR Not Detected Not Detected   Urinalysis with Reflex Culture and Microscopic   Result Value Ref Range    Color, Urine Yellow Light-Yellow, Yellow, Dark-Yellow    Appearance, Urine Clear Clear    Specific Gravity, Urine 1.019 1.005 - 1.035    pH, Urine 6.5 5.0, 5.5, 6.0, 6.5, 7.0, 7.5, 8.0    Protein, Urine 100 (2+) (A) NEGATIVE, 10 (TRACE), 20 (TRACE) mg/dL    Glucose, Urine Normal Normal mg/dL    Blood, Urine OVER (3+) (A) NEGATIVE    Ketones, Urine NEGATIVE NEGATIVE mg/dL    Bilirubin, Urine NEGATIVE NEGATIVE    Urobilinogen, Urine Normal Normal mg/dL    Nitrite, Urine NEGATIVE NEGATIVE    Leukocyte Esterase, Urine 25 Adin/µL (A) NEGATIVE   Microscopic Only, Urine   Result  Value Ref Range    WBC, Urine 6-10 (A) 1-5, NONE /HPF    RBC, Urine NONE NONE, 1-2, 3-5 /HPF    Squamous Epithelial Cells, Urine 1-9 (SPARSE) Reference range not established. /HPF    Mucus, Urine FEW Reference range not established. /LPF   Urine Culture    Specimen: Clean Catch/Voided; Urine   Result Value Ref Range    Urine Culture No growth    Basic metabolic panel   Result Value Ref Range    Glucose 127 (H) 65 - 99 mg/dL    Sodium 144 133 - 145 mmol/L    Potassium <1.0 (LL) 3.4 - 5.1 mmol/L    Chloride 103 97 - 107 mmol/L    Bicarbonate 28 24 - 31 mmol/L    Urea Nitrogen 11 8 - 25 mg/dL    Creatinine 0.90 0.40 - 1.60 mg/dL    eGFR >90 >60 mL/min/1.73m*2    Calcium 8.4 (L) 8.5 - 10.4 mg/dL    Anion Gap 13 <=19 mmol/L   Magnesium   Result Value Ref Range    Magnesium 1.80 1.60 - 3.10 mg/dL   Phosphorus   Result Value Ref Range    Phosphorus 1.5 (L) 2.5 - 4.5 mg/dL   Basic metabolic panel   Result Value Ref Range    Glucose 102 (H) 65 - 99 mg/dL    Sodium 145 133 - 145 mmol/L    Potassium <1.5 (LL) 3.4 - 5.1 mmol/L    Chloride 105 97 - 107 mmol/L    Bicarbonate 28 24 - 31 mmol/L    Urea Nitrogen 10 8 - 25 mg/dL    Creatinine 0.90 0.40 - 1.60 mg/dL    eGFR >90 >60 mL/min/1.73m*2    Calcium 8.3 (L) 8.5 - 10.4 mg/dL    Anion Gap 12 <=19 mmol/L   Creatine Kinase   Result Value Ref Range    Creatine Kinase 3,711 (H) 24 - 195 U/L   Urinalysis with Reflex Microscopic   Result Value Ref Range    Color, Urine Light-Yellow Light-Yellow, Yellow, Dark-Yellow    Appearance, Urine Clear Clear    Specific Gravity, Urine 1.032 1.005 - 1.035    pH, Urine 7.0 5.0, 5.5, 6.0, 6.5, 7.0, 7.5, 8.0    Protein, Urine 20 (TRACE) NEGATIVE, 10 (TRACE), 20 (TRACE) mg/dL    Glucose, Urine Normal Normal mg/dL    Blood, Urine 1.0 (3+) (A) NEGATIVE    Ketones, Urine NEGATIVE NEGATIVE mg/dL    Bilirubin, Urine NEGATIVE NEGATIVE    Urobilinogen, Urine Normal Normal mg/dL    Nitrite, Urine NEGATIVE NEGATIVE    Leukocyte Esterase, Urine NEGATIVE  NEGATIVE   Microscopic Only, Urine   Result Value Ref Range    WBC, Urine 1-5 1-5, NONE /HPF    RBC, Urine NONE NONE, 1-2, 3-5 /HPF   NT-PROBNP   Result Value Ref Range    PROBNP     Serial Troponin, Initial ([facility])   Result Value Ref Range    Troponin T, High Sensitivity     Hemoglobin A1C   Result Value Ref Range    Hemoglobin A1C 5.6 See below %    Estimated Average Glucose 114 Not Established mg/dL   Type And Screen   Result Value Ref Range    ABO TYPE B     Rh TYPE POS     ANTIBODY SCREEN NEG    Protime-INR   Result Value Ref Range    Protime 11.2 9.3 - 12.7 seconds    INR 1.1 0.9 - 1.2   Blood Culture    Specimen: Peripheral Venipuncture; Blood culture   Result Value Ref Range    Blood Culture No growth at 2 days    Blood Culture    Specimen: Peripheral Venipuncture; Blood culture   Result Value Ref Range    Blood Culture No growth at 2 days    Abo/Rh Group Test   Result Value Ref Range    ABO TYPE B     Rh TYPE POS    Potassium, Urine Random   Result Value Ref Range    Potassium, Urine Random 13 mmol/L    Creatinine, Urine Random 50.1 NOT ESTABLISHED mg/dL    Potassium/Creatinine Ratio 26 Not established mmol/g Creat   Comprehensive metabolic panel   Result Value Ref Range    Glucose 165 (H) 65 - 99 mg/dL    Sodium 144 133 - 145 mmol/L    Potassium 1.6 (LL) 3.4 - 5.1 mmol/L    Chloride 107 97 - 107 mmol/L    Bicarbonate 26 24 - 31 mmol/L    Urea Nitrogen 9 8 - 25 mg/dL    Creatinine 0.80 0.40 - 1.60 mg/dL    eGFR >90 >60 mL/min/1.73m*2    Calcium 7.6 (L) 8.5 - 10.4 mg/dL    Albumin 2.9 (L) 3.5 - 5.0 g/dL    Alkaline Phosphatase 80 35 - 125 U/L    Total Protein 4.7 (L) 5.9 - 7.9 g/dL     (H) 5 - 40 U/L    Bilirubin, Total 0.5 0.1 - 1.2 mg/dL    ALT 62 (H) 5 - 40 U/L    Anion Gap 11 <=19 mmol/L   Magnesium   Result Value Ref Range    Magnesium 1.70 1.60 - 3.10 mg/dL   Phosphorus   Result Value Ref Range    Phosphorus 1.9 (L) 2.5 - 4.5 mg/dL   Serial Troponin, 2 Hour ([facility])   Result Value Ref Range     Troponin T, High Sensitivity 78 (HH) <=14 ng/L   PST Top   Result Value Ref Range    Extra Tube Hold for add-ons.    Basic Metabolic Panel   Result Value Ref Range    Glucose 94 65 - 99 mg/dL    Sodium 146 (H) 133 - 145 mmol/L    Potassium 1.6 (LL) 3.4 - 5.1 mmol/L    Chloride 109 (H) 97 - 107 mmol/L    Bicarbonate 25 24 - 31 mmol/L    Urea Nitrogen 10 8 - 25 mg/dL    Creatinine 0.80 0.40 - 1.60 mg/dL    eGFR >90 >60 mL/min/1.73m*2    Calcium 7.2 (L) 8.5 - 10.4 mg/dL    Anion Gap 12 <=19 mmol/L   Phosphorus   Result Value Ref Range    Phosphorus 1.7 (L) 2.5 - 4.5 mg/dL   Magnesium   Result Value Ref Range    Magnesium 1.50 (L) 1.60 - 3.10 mg/dL   Serial Troponin, 6 Hour ([facility])   Result Value Ref Range    Troponin T, High Sensitivity 75 (HH) <=14 ng/L   Phosphorus   Result Value Ref Range    Phosphorus 2.3 (L) 2.5 - 4.5 mg/dL   Basic Metabolic Panel   Result Value Ref Range    Glucose 108 (H) 65 - 99 mg/dL    Sodium 144 133 - 145 mmol/L    Potassium 1.8 (LL) 3.4 - 5.1 mmol/L    Chloride 107 97 - 107 mmol/L    Bicarbonate 26 24 - 31 mmol/L    Urea Nitrogen 10 8 - 25 mg/dL    Creatinine 0.80 0.40 - 1.60 mg/dL    eGFR >90 >60 mL/min/1.73m*2    Calcium 7.5 (L) 8.5 - 10.4 mg/dL    Anion Gap 11 <=19 mmol/L   Magnesium   Result Value Ref Range    Magnesium 1.90 1.60 - 3.10 mg/dL   Basic Metabolic Panel   Result Value Ref Range    Glucose 114 (H) 65 - 99 mg/dL    Sodium 145 133 - 145 mmol/L    Potassium 1.7 (LL) 3.4 - 5.1 mmol/L    Chloride 108 (H) 97 - 107 mmol/L    Bicarbonate 25 24 - 31 mmol/L    Urea Nitrogen 9 8 - 25 mg/dL    Creatinine 0.80 0.40 - 1.60 mg/dL    eGFR >90 >60 mL/min/1.73m*2    Calcium 7.2 (L) 8.5 - 10.4 mg/dL    Anion Gap 12 <=19 mmol/L   Phosphorus   Result Value Ref Range    Phosphorus 2.9 2.5 - 4.5 mg/dL   Magnesium   Result Value Ref Range    Magnesium 1.70 1.60 - 3.10 mg/dL   CBC   Result Value Ref Range    WBC 8.3 4.4 - 11.3 x10*3/uL    nRBC 0.0 0.0 - 0.0 /100 WBCs    RBC 3.69 (L) 4.50 - 5.90  x10*6/uL    Hemoglobin 11.1 (L) 13.5 - 17.5 g/dL    Hematocrit 30.7 (L) 41.0 - 52.0 %    MCV 83 80 - 100 fL    MCH 30.1 26.0 - 34.0 pg    MCHC 36.2 (H) 32.0 - 36.0 g/dL    RDW 15.2 (H) 11.5 - 14.5 %    Platelets 246 150 - 450 x10*3/uL   Comprehensive metabolic panel   Result Value Ref Range    Glucose 112 (H) 65 - 99 mg/dL    Sodium 143 133 - 145 mmol/L    Potassium 1.9 (LL) 3.4 - 5.1 mmol/L    Chloride 107 97 - 107 mmol/L    Bicarbonate 26 24 - 31 mmol/L    Urea Nitrogen 9 8 - 25 mg/dL    Creatinine 0.80 0.40 - 1.60 mg/dL    eGFR >90 >60 mL/min/1.73m*2    Calcium 7.7 (L) 8.5 - 10.4 mg/dL    Albumin 2.8 (L) 3.5 - 5.0 g/dL    Alkaline Phosphatase 78 35 - 125 U/L    Total Protein 5.1 (L) 5.9 - 7.9 g/dL     (H) 5 - 40 U/L    Bilirubin, Total 0.5 0.1 - 1.2 mg/dL    ALT 67 (H) 5 - 40 U/L    Anion Gap 10 <=19 mmol/L   Phosphorus   Result Value Ref Range    Phosphorus 3.1 2.5 - 4.5 mg/dL   Magnesium   Result Value Ref Range    Magnesium 1.90 1.60 - 3.10 mg/dL   Creatine Kinase   Result Value Ref Range    Creatine Kinase 5,486 (H) 24 - 195 U/L   Basic Metabolic Panel   Result Value Ref Range    Glucose 100 (H) 65 - 99 mg/dL    Sodium 147 (H) 133 - 145 mmol/L    Potassium 2.0 (LL) 3.4 - 5.1 mmol/L    Chloride 110 (H) 97 - 107 mmol/L    Bicarbonate 26 24 - 31 mmol/L    Urea Nitrogen 8 8 - 25 mg/dL    Creatinine 0.80 0.40 - 1.60 mg/dL    eGFR >90 >60 mL/min/1.73m*2    Calcium 7.7 (L) 8.5 - 10.4 mg/dL    Anion Gap 11 <=19 mmol/L   Basic Metabolic Panel   Result Value Ref Range    Glucose 117 (H) 65 - 99 mg/dL    Sodium 145 133 - 145 mmol/L    Potassium 2.3 (LL) 3.4 - 5.1 mmol/L    Chloride 110 (H) 97 - 107 mmol/L    Bicarbonate 22 (L) 24 - 31 mmol/L    Urea Nitrogen 7 (L) 8 - 25 mg/dL    Creatinine 0.80 0.40 - 1.60 mg/dL    eGFR >90 >60 mL/min/1.73m*2    Calcium 7.5 (L) 8.5 - 10.4 mg/dL    Anion Gap 13 <=19 mmol/L   Drug Screen, Urine   Result Value Ref Range    Amphetamine Screen, Urine Negative      Barbiturate Screen,  Urine Negative      Benzodiazepines Screen, Urine Negative      Cannabinoid Screen, Urine Negative      Cocaine Metabolite Screen, Urine Negative      Fentanyl Screen, Urine Negative       Methadone Screen, Urine Negative      Opiate Screen, Urine Positive (A)      Oxycodone Screen, Urine Positive (A)      PCP Screen, Urine Negative     Basic Metabolic Panel   Result Value Ref Range    Glucose 128 (H) 65 - 99 mg/dL    Sodium 146 (H) 133 - 145 mmol/L    Potassium 1.9 (LL) 3.4 - 5.1 mmol/L    Chloride 111 (H) 97 - 107 mmol/L    Bicarbonate 24 24 - 31 mmol/L    Urea Nitrogen 7 (L) 8 - 25 mg/dL    Creatinine 0.80 0.40 - 1.60 mg/dL    eGFR >90 >60 mL/min/1.73m*2    Calcium 7.3 (L) 8.5 - 10.4 mg/dL    Anion Gap 11 <=19 mmol/L   Basic Metabolic Panel   Result Value Ref Range    Glucose 120 (H) 65 - 99 mg/dL    Sodium 145 133 - 145 mmol/L    Potassium 1.9 (LL) 3.4 - 5.1 mmol/L    Chloride 108 (H) 97 - 107 mmol/L    Bicarbonate 25 24 - 31 mmol/L    Urea Nitrogen 7 (L) 8 - 25 mg/dL    Creatinine 0.80 0.40 - 1.60 mg/dL    eGFR >90 >60 mL/min/1.73m*2    Calcium 7.7 (L) 8.5 - 10.4 mg/dL    Anion Gap 12 <=19 mmol/L   C-Reactive Protein, High Sensitivity   Result Value Ref Range    CRP, High Sensitivity 8.2 (H) <1.0 mg/L   FRENCH with Reflex to TUNDE   Result Value Ref Range    FRENCH Negative Negative   Basic Metabolic Panel   Result Value Ref Range    Glucose 145 (H) 65 - 99 mg/dL    Sodium 146 (H) 133 - 145 mmol/L    Potassium 2.0 (LL) 3.4 - 5.1 mmol/L    Chloride 110 (H) 97 - 107 mmol/L    Bicarbonate 24 24 - 31 mmol/L    Urea Nitrogen 6 (L) 8 - 25 mg/dL    Creatinine 0.90 0.40 - 1.60 mg/dL    eGFR >90 >60 mL/min/1.73m*2    Calcium 7.7 (L) 8.5 - 10.4 mg/dL    Anion Gap 12 <=19 mmol/L   Carcinoembryonic Antigen   Result Value Ref Range    Carcinoembryonic AG 1.4 ug/L   Transthoracic Echo (TTE) Complete   Result Value Ref Range    AV pk azar 1.52 m/s    LVOT diam 2.00 cm    AV mn grad 5.0 mmHg    MV E/A ratio 1.37     Tricuspid annular  plane systolic excursion 2.9 cm    LA vol index A/L 17.6 ml/m2    LV EF 58 %    RV free wall pk S' 13.50 cm/s    LVIDd 4.58 cm    AV pk grad 9.2 mmHg    Aortic Valve Area by Continuity of VTI 2.57 cm2    Aortic Valve Area by Continuity of Peak Velocity 2.69 cm2    LV A4C EF 50.6    Basic Metabolic Panel   Result Value Ref Range    Glucose 155 (H) 65 - 99 mg/dL    Sodium 146 (H) 133 - 145 mmol/L    Potassium 2.0 (LL) 3.4 - 5.1 mmol/L    Chloride 109 (H) 97 - 107 mmol/L    Bicarbonate 24 24 - 31 mmol/L    Urea Nitrogen 6 (L) 8 - 25 mg/dL    Creatinine 0.80 0.40 - 1.60 mg/dL    eGFR >90 >60 mL/min/1.73m*2    Calcium 7.8 (L) 8.5 - 10.4 mg/dL    Anion Gap 13 <=19 mmol/L   Basic Metabolic Panel   Result Value Ref Range    Glucose 135 (H) 65 - 99 mg/dL    Sodium 145 133 - 145 mmol/L    Potassium 2.0 (LL) 3.4 - 5.1 mmol/L    Chloride 108 (H) 97 - 107 mmol/L    Bicarbonate 24 24 - 31 mmol/L    Urea Nitrogen 5 (L) 8 - 25 mg/dL    Creatinine 0.80 0.40 - 1.60 mg/dL    eGFR >90 >60 mL/min/1.73m*2    Calcium 7.9 (L) 8.5 - 10.4 mg/dL    Anion Gap 13 <=19 mmol/L   Basic metabolic panel   Result Value Ref Range    Glucose 128 (H) 65 - 99 mg/dL    Sodium 144 133 - 145 mmol/L    Potassium 2.2 (LL) 3.4 - 5.1 mmol/L    Chloride 108 (H) 97 - 107 mmol/L    Bicarbonate 24 24 - 31 mmol/L    Urea Nitrogen 5 (L) 8 - 25 mg/dL    Creatinine 0.70 0.40 - 1.60 mg/dL    eGFR >90 >60 mL/min/1.73m*2    Calcium 7.6 (L) 8.5 - 10.4 mg/dL    Anion Gap 12 <=19 mmol/L   Basic Metabolic Panel   Result Value Ref Range    Glucose 128 (H) 65 - 99 mg/dL    Sodium 147 (H) 133 - 145 mmol/L    Potassium 2.3 (LL) 3.4 - 5.1 mmol/L    Chloride 112 (H) 97 - 107 mmol/L    Bicarbonate 24 24 - 31 mmol/L    Urea Nitrogen 4 (L) 8 - 25 mg/dL    Creatinine 0.70 0.40 - 1.60 mg/dL    eGFR >90 >60 mL/min/1.73m*2    Calcium 7.5 (L) 8.5 - 10.4 mg/dL    Anion Gap 11 <=19 mmol/L   Creatine Kinase   Result Value Ref Range    Creatine Kinase 10,923 (H) 24 - 195 U/L   CBC and Auto  Differential   Result Value Ref Range    WBC 7.6 4.4 - 11.3 x10*3/uL    nRBC 0.0 0.0 - 0.0 /100 WBCs    RBC 3.52 (L) 4.50 - 5.90 x10*6/uL    Hemoglobin 10.6 (L) 13.5 - 17.5 g/dL    Hematocrit 30.2 (L) 41.0 - 52.0 %    MCV 86 80 - 100 fL    MCH 30.1 26.0 - 34.0 pg    MCHC 35.1 32.0 - 36.0 g/dL    RDW 15.7 (H) 11.5 - 14.5 %    Platelets 237 150 - 450 x10*3/uL    Neutrophils % 56.9 40.0 - 80.0 %    Immature Granulocytes %, Automated 0.3 0.0 - 0.9 %    Lymphocytes % 33.3 13.0 - 44.0 %    Monocytes % 6.9 2.0 - 10.0 %    Eosinophils % 2.1 0.0 - 6.0 %    Basophils % 0.5 0.0 - 2.0 %    Neutrophils Absolute 4.34 1.20 - 7.70 x10*3/uL    Immature Granulocytes Absolute, Automated 0.02 0.00 - 0.70 x10*3/uL    Lymphocytes Absolute 2.54 1.20 - 4.80 x10*3/uL    Monocytes Absolute 0.53 0.10 - 1.00 x10*3/uL    Eosinophils Absolute 0.16 0.00 - 0.70 x10*3/uL    Basophils Absolute 0.04 0.00 - 0.10 x10*3/uL   Comprehensive Metabolic Panel   Result Value Ref Range    Glucose 102 (H) 65 - 99 mg/dL    Sodium 146 (H) 133 - 145 mmol/L    Potassium 2.4 (LL) 3.4 - 5.1 mmol/L    Chloride 112 (H) 97 - 107 mmol/L    Bicarbonate 24 24 - 31 mmol/L    Urea Nitrogen 4 (L) 8 - 25 mg/dL    Creatinine 0.80 0.40 - 1.60 mg/dL    eGFR >90 >60 mL/min/1.73m*2    Calcium 7.4 (L) 8.5 - 10.4 mg/dL    Albumin 2.8 (L) 3.5 - 5.0 g/dL    Alkaline Phosphatase 72 35 - 125 U/L    Total Protein 4.5 (L) 5.9 - 7.9 g/dL     (H) 5 - 40 U/L    Bilirubin, Total 0.5 0.1 - 1.2 mg/dL    ALT 87 (H) 5 - 40 U/L    Anion Gap 10 <=19 mmol/L   Magnesium   Result Value Ref Range    Magnesium 1.60 1.60 - 3.10 mg/dL   Phosphorus   Result Value Ref Range    Phosphorus 2.6 2.5 - 4.5 mg/dL   Basic Metabolic Panel   Result Value Ref Range    Glucose 102 (H) 65 - 99 mg/dL    Sodium 146 (H) 133 - 145 mmol/L    Potassium 2.4 (LL) 3.4 - 5.1 mmol/L    Chloride 112 (H) 97 - 107 mmol/L    Bicarbonate 24 24 - 31 mmol/L    Urea Nitrogen 4 (L) 8 - 25 mg/dL    Creatinine 0.80 0.40 - 1.60 mg/dL     eGFR >90 >60 mL/min/1.73m*2    Calcium 7.4 (L) 8.5 - 10.4 mg/dL    Anion Gap 10 <=19 mmol/L   Sedimentation Rate   Result Value Ref Range    Sedimentation Rate 6 0 - 15 mm/h   Basic Metabolic Panel   Result Value Ref Range    Glucose 95 65 - 99 mg/dL    Sodium 148 (H) 133 - 145 mmol/L    Potassium 2.8 (LL) 3.4 - 5.1 mmol/L    Chloride 113 (H) 97 - 107 mmol/L    Bicarbonate 24 24 - 31 mmol/L    Urea Nitrogen 3 (L) 8 - 25 mg/dL    Creatinine 0.80 0.40 - 1.60 mg/dL    eGFR >90 >60 mL/min/1.73m*2    Calcium 7.3 (L) 8.5 - 10.4 mg/dL    Anion Gap 11 <=19 mmol/L   Calcium, ionized   Result Value Ref Range    POCT Calcium, Ionized 1.07 (L) 1.1 - 1.33 mmol/L   Blood Gas Arterial Full Panel   Result Value Ref Range    POCT pH, Arterial 7.45 (H) 7.38 - 7.42 pH    POCT pCO2, Arterial 33 (L) 38 - 42 mm Hg    POCT pO2, Arterial      POCT SO2, Arterial 95 94 - 100 %    POCT Oxy Hemoglobin, Arterial 92.3 (L) 94.0 - 98.0 %    POCT Hematocrit Calculated, Arterial 34.0 (L) 41.0 - 52.0 %    POCT Sodium, Arterial 144 136 - 145 mmol/L    POCT Potassium, Arterial 2.8 (LL) 3.5 - 5.3 mmol/L    POCT Chloride, Arterial 113 (H) 98 - 107 mmol/L    POCT Ionized Calcium, Arterial 1.10 1.10 - 1.33 mmol/L    POCT Glucose, Arterial 132 (H) 74 - 99 mg/dL    POCT Lactate, Arterial 1.7 0.4 - 2.0 mmol/L    POCT Base Excess, Arterial -0.6 -2.0 - 3.0 mmol/L    POCT HCO3 Calculated, Arterial 22.9 22.0 - 26.0 mmol/L    POCT Hemoglobin, Arterial 11.3 (L) 13.5 - 17.5 g/dL    POCT Anion Gap, Arterial 11 10 - 25 mmo/L    Patient Temperature 37.0 degrees Celsius    FiO2 21 %    Critical Called By SANDRA HERNÁNDEZ     Critical Called To DR DAVIES     Critical Call Time 1100     Critical Read Back Y     Critical Note K     Site of Arterial Puncture Radial Left     Geovanny's Test Positive    PTH, Intact   Result Value Ref Range    Parathyroid Hormone, Intact 159.9 (H) 18.5 - 88.0 pg/mL   Basic Metabolic Panel   Result Value Ref Range    Glucose 161 (H) 65 - 99  mg/dL    Sodium 145 133 - 145 mmol/L    Potassium 2.6 (LL) 3.4 - 5.1 mmol/L    Chloride 111 (H) 97 - 107 mmol/L    Bicarbonate 24 24 - 31 mmol/L    Urea Nitrogen <3 (L) 8 - 25 mg/dL    Creatinine 0.70 0.40 - 1.60 mg/dL    eGFR >90 >60 mL/min/1.73m*2    Calcium 7.9 (L) 8.5 - 10.4 mg/dL    Anion Gap 10 <=19 mmol/L   Magnesium   Result Value Ref Range    Magnesium 2.10 1.60 - 3.10 mg/dL   Carcinoembryonic Antigen   Result Value Ref Range    Carcinoembryonic AG 1.3 ug/L   Hepatitis Panel, Acute   Result Value Ref Range    Hepatitis B Surface AG Nonreactive Nonreactive    Hepatitis A  AB- IgM Nonreactive Nonreactive    Hepatitis B Core AB; IgM Nonreactive Nonreactive    Hepatitis C AB Nonreactive Nonreactive   Renal function panel   Result Value Ref Range    Glucose 75 65 - 99 mg/dL    Sodium 144 133 - 145 mmol/L    Potassium 2.8 (LL) 3.4 - 5.1 mmol/L    Chloride 109 (H) 97 - 107 mmol/L    Bicarbonate 25 24 - 31 mmol/L    Urea Nitrogen <3 (L) 8 - 25 mg/dL    Creatinine 0.70 0.40 - 1.60 mg/dL    eGFR >90 >60 mL/min/1.73m*2    Calcium 7.7 (L) 8.5 - 10.4 mg/dL    Phosphorus 2.6 2.5 - 4.5 mg/dL    Albumin 3.0 (L) 3.5 - 5.0 g/dL    Anion Gap 10 <=19 mmol/L   Potassium   Result Value Ref Range    Potassium 3.1 (L) 3.4 - 5.1 mmol/L   CBC and Auto Differential   Result Value Ref Range    WBC 7.0 4.4 - 11.3 x10*3/uL    nRBC 0.0 0.0 - 0.0 /100 WBCs    RBC 3.53 (L) 4.50 - 5.90 x10*6/uL    Hemoglobin 10.7 (L) 13.5 - 17.5 g/dL    Hematocrit 30.8 (L) 41.0 - 52.0 %    MCV 87 80 - 100 fL    MCH 30.3 26.0 - 34.0 pg    MCHC 34.7 32.0 - 36.0 g/dL    RDW 15.7 (H) 11.5 - 14.5 %    Platelets 235 150 - 450 x10*3/uL    Neutrophils % 57.6 40.0 - 80.0 %    Immature Granulocytes %, Automated 0.4 0.0 - 0.9 %    Lymphocytes % 33.5 13.0 - 44.0 %    Monocytes % 5.5 2.0 - 10.0 %    Eosinophils % 2.4 0.0 - 6.0 %    Basophils % 0.6 0.0 - 2.0 %    Neutrophils Absolute 4.01 1.20 - 7.70 x10*3/uL    Immature Granulocytes Absolute, Automated 0.03 0.00 - 0.70  x10*3/uL    Lymphocytes Absolute 2.33 1.20 - 4.80 x10*3/uL    Monocytes Absolute 0.38 0.10 - 1.00 x10*3/uL    Eosinophils Absolute 0.17 0.00 - 0.70 x10*3/uL    Basophils Absolute 0.04 0.00 - 0.10 x10*3/uL   Comprehensive Metabolic Panel   Result Value Ref Range    Glucose 87 65 - 99 mg/dL    Sodium 143 133 - 145 mmol/L    Potassium 3.0 (L) 3.4 - 5.1 mmol/L    Chloride 110 (H) 97 - 107 mmol/L    Bicarbonate 24 24 - 31 mmol/L    Urea Nitrogen <3 (L) 8 - 25 mg/dL    Creatinine 0.70 0.40 - 1.60 mg/dL    eGFR >90 >60 mL/min/1.73m*2    Calcium 7.6 (L) 8.5 - 10.4 mg/dL    Albumin 2.7 (L) 3.5 - 5.0 g/dL    Alkaline Phosphatase 73 35 - 125 U/L    Total Protein 4.6 (L) 5.9 - 7.9 g/dL     (H) 5 - 40 U/L    Bilirubin, Total 0.5 0.1 - 1.2 mg/dL     (H) 5 - 40 U/L    Anion Gap 9 <=19 mmol/L   Magnesium   Result Value Ref Range    Magnesium 1.60 1.60 - 3.10 mg/dL   Phosphorus   Result Value Ref Range    Phosphorus 2.6 2.5 - 4.5 mg/dL   Creatine Kinase   Result Value Ref Range    Creatine Kinase 11,111 (H) 24 - 195 U/L   Potassium   Result Value Ref Range    Potassium 3.6 3.4 - 5.1 mmol/L       Assessment/Plan   Profound hypokalemia calcium is back to normal continue to replace and monitor very closely  Hypertension rule out secondary form of hypertension from adrenal etiology  Acute rhabdomyolysis secondary to hypokalemia and the use of statins his CPK continue to rise will switch his IV is back to normal saline avoid statins  Chronic pain  Hypophosphatemia  it is resolved  Hypomagnesemia solved   hypocalcemia is improving     discussed with the family in details       Ward Garcia MD

## 2024-09-13 NOTE — PROGRESS NOTES
Occupational Therapy    Evaluation    Patient Name: Bereket Schaefer  MRN: [id number]  Department: [facility] 4 ICU  Room: [address] [place], [place] [address]  Today's Date: 9/13/2024  Time Calculation  Start Time: 0805  Stop Time: 0825  Time Calculation (min): 20 min    Assessment  IP OT Assessment  OT Assessment: Patient is a 48 year old male admitted with hypokalemia. Patient is completeing functional transfers and mobility with Close Supervision. he demonstrates the ability to complete ADL tasks. Patient demonstrates no acute OT needs at this time. Primary focus is with mobility. Will defer to PT to reduce the risk for duplication of services. Patient agreeable that there are no acute OT needs. OT orders to be discontinued at this tinme.  Prognosis: Good  Barriers to Discharge: None  Evaluation/Treatment Tolerance: Patient tolerated treatment well  End of Session Communication: Bedside nurse  End of Session Patient Position: Up in chair, Alarm off, not on at start of session, Alarm off, caregiver present  Plan:  No Skilled OT: Independent with ADLs  OT Frequency: OT eval only  OT Discharge Recommendations: No further acute OT  OT Recommended Transfer Status: Stand by assist  OT - OK to Discharge: Yes    Subjective   Current Problem:  1. Hypokalemia        2. Generalized weakness        3. Primary hypertension  Transthoracic Echo (TTE) Complete    Transthoracic Echo (TTE) Complete      4. Cardiomyopathy, unspecified (Multi)  Transthoracic Echo (TTE) Complete        General:  General  Reason for Referral: decline in ADLs, Hypokalemia  Referred By: Dr. Lorenzo  Past Medical History Relevant to Rehab: anxiety, back pain, BPH, fatigue, GERD  Missed Visit: Yes  Missed Visit Reason: Patient placed on medical hold  Family/Caregiver Present: Yes  Caregiver Feedback: spouse  Co-Treatment: PT  Co-Treatment Reason: safety with OOB mobility  Prior to Session Communication: Bedside nurse  Patient Position Received: Bed, 3 rail up, Alarm off, not on at start of  "session  Preferred Learning Style: verbal, visual  General Comment: Patient is a 48 year old male who presented to the ED with c/o weakness. Patient found to have severe hypokalemia. Patient also with low levels of calcium. He is cleared by nursing for therapy. Patient in bed upon arrival and agreeable to participate. +IV, tele  Precautions:  Medical Precautions: Fall precautions    Vital Sign (Past 2hrs)        Date/Time Vitals Session Patient Position Pulse Resp SpO2 BP MAP (mmHg)    09/13/24 0811 --  --  67  21  96 %  --  --                   Pain:  Pain Assessment  Pain Assessment: 0-10  0-10 (Numeric) Pain Score: 4  Pain Type: Chronic pain  Pain Location: Back  Pain Interventions: Repositioned, Ambulation/increased activity (RN aware)    Objective   Cognition:  Overall Cognitive Status: Within Functional Limits  Cognition Comments: pleasant and cooperative. able to follow commands throughout     Home Living:  Type of Home: House  Lives With: Spouse, Parent(s) (Mother)  Home Adaptive Equipment: Walker rolling or standard, Cane  Home Layout: Two level, Stairs to alternate level with rails, Full bath main level  Alternate Level Stairs-Rails: Right  Alternate Level Stairs-Number of Steps: 11  Home Access: Stairs to enter with rails  Entrance Stairs-Rails: Both  Entrance Stairs-Number of Steps: 4  Bathroom Shower/Tub: Walk-in shower  Bathroom Equipment: Shower chair without back   Prior Function:  Level of [place]: Independent with ADLs and functional transfers, Independent with homemaking with ambulation  ADL Assistance: Independent  Homemaking Assistance: Independent  Ambulatory Assistance:  (occassional use of cane when back hurts \"not very often\")  Vocational: Full time employment ([employer] )  IADL History:  Homemaking Responsibilities: Yes  IADL Comments: patient is independent with ADLs/IADLs at baseline  ADL:  Eating Assistance: Independent  Grooming Assistance: Independent  Bathing Assistance: " Independent  UE Dressing Assistance: Independent  LE Dressing Assistance: Independent  Toileting Assistance with Device: Independent  Activity Tolerance:  Endurance: Decreased tolerance for upright activites  Activity Tolerance Comments: Fair+ tolerance  Bed Mobility/Transfers: Bed Mobility 1  Bed Mobility 1: Supine to sitting  Level of Assistance 1: Close supervision  Bed Mobility Comments 1: use of bed rails. head of bed slightly elevated    Transfer 1  Transfer From 1: Bed to  Transfer to 1: Stand  Technique 1: Sit to stand  Transfer Device 1: Walker  Transfer Level of Assistance 1: Close supervision    Functional Mobility:  Functional Mobility  Functional Mobility Performed: Yes  Functional Mobility 1  Surface 1: Level tile  Device 1: Rolling walker  Assistance 1: Close supervision  Comments 1: > household distances  Sitting Balance:  Static Sitting Balance  Static Sitting-Balance Support: Feet supported, No upper extremity supported  Static Sitting-Level of Assistance: Close supervision  Standing Balance:  Static Standing Balance  Static Standing-Balance Support: Bilateral upper extremity supported  Static Standing-Level of Assistance: Close supervision    IADL's:   Homemaking Responsibilities: Yes  IADL Comments: patient is independent with ADLs/IADLs at baseline    Sensation:  Light Touch: No apparent deficits  Sensation Comment: patient denies numbness/tingling  Strength:  Strength Comments: B UE at least >/= 3/5 grossly. observed functionally  Coordination:  Movements are Fluid and Coordinated: Yes   Hand Function:  Hand Function  Gross Grasp: Functional  Coordination: Functional  Extremities: RUE   RUE : Within Functional Limits (observed functionally) and LUE   LUE: Within Functional Limits (observed functionally)    Outcome Measures: [facility] Daily Activity  Putting on and taking off regular lower body clothing: None  Bathing (including washing, rinsing, drying): None  Putting on and taking off regular  upper body clothing: None  Toileting, which includes using toilet, bedpan or urinal: None  Taking care of personal grooming such as brushing teeth: None  Eating Meals: None  Daily Activity - Total Score: 24      Education Documentation  Body Mechanics, taught by Edie Rhodes OT at 9/13/2024 10:07 AM.  Learner: Significant Other, Patient  Readiness: Acceptance  Method: Explanation, Demonstration  Response: Verbalizes Understanding, Demonstrated Understanding    Precautions, taught by Edie Rhodes OT at 9/13/2024 10:07 AM.  Learner: Significant Other, Patient  Readiness: Acceptance  Method: Explanation, Demonstration  Response: Verbalizes Understanding, Demonstrated Understanding    ADL Training, taught by Edie Rhodes OT at 9/13/2024 10:07 AM.  Learner: Significant Other, Patient  Readiness: Acceptance  Method: Explanation, Demonstration  Response: Verbalizes Understanding, Demonstrated Understanding    Education Comments  No comments found.      Goals: No acute OT needs identified

## 2024-09-13 NOTE — PROGRESS NOTES
"Physical Therapy    Physical Therapy Evaluation    Patient Name: Bereket Schaefer  MRN: [id number]  Department: [facility] 4 ICU  [facility]  Today's Date: 9/13/2024   Time Calculation  Start Time: 0811  Stop Time: 0825  Time Calculation (min): 14 min    Assessment/Plan   PT Assessment  PT Assessment Results: Decreased strength, Decreased endurance, Impaired balance, Decreased mobility  Rehab Prognosis: Good  Barriers to Discharge: medical management  Medical Staff Made Aware: Yes  End of Session Communication: Bedside nurse  End of Session Patient Position: Up in chair, Alarm off, not on at start of session      Assessment Comment: 49 y/o male who presented to ED wt profound weakness; hypokalemia. Pt is indep at baseline. Occasionally uses a cane, \"when my back is bad.\" Is currently limited in mobility due to generalized weakness from prolonged hospitalization and due to decreased activity tolerance. Would benefit from cont ambulation [facility] staff, and progression with PT to no longer requiring use of wheeled walker.        IP OR SWING BED PT PLAN  Inpatient or Swing Bed: Inpatient  PT Plan  Treatment/Interventions: Stair training, Gait training, Balance training, Endurance training, Strengthening, Therapeutic activity  PT Plan: Ongoing PT  PT Frequency: 2 times per week  PT Discharge Recommendations: No PT needed after discharge (family assist as needed)  PT Recommended Transfer Status: Assistive device  PT - OK to Discharge: Yes      Subjective   General Visit Information:  General  Reason for Referral: impaired mobility; hypokalemia  Past Medical History Relevant to Rehab: anxiety, back pain, BPH, fatigue, GERD  Missed Visit: Yes  Missed Visit Reason: Patient placed on medical hold  Family/Caregiver Present: Yes  Caregiver Feedback: spouse  Co-Treatment: OT  Co-Treatment Reason: safety with OOB mobility  Prior to Session Communication: Bedside nurse  Patient Position Received: Bed, 3 rail up, Alarm off, not on at start " "of session  Preferred Learning Style: verbal, visual  General Comment: Patient is a 48 year old male who presented to the ED with c/o weakness. Patient found to have severe hypokalemia. Patient also with low levels of calcium. He is cleared by nursing for therapy. Patient in bed upon arrival and agreeable to participate. PIV (calcium drip), tele    Home Living:  Home Living  Type of Home: House  Lives With: Spouse, Parent(s) (Mother)  Home Adaptive Equipment: Walker rolling or standard, Cane  Home Layout: Two level, Stairs to alternate level with rails, Full bath main level  Alternate Level Stairs-Rails: Right  Alternate Level Stairs-Number of Steps: 11  Home Access: Stairs to enter with rails  Entrance Stairs-Rails: Both  Entrance Stairs-Number of Steps: 4  Bathroom Shower/Tub: Walk-in shower  Bathroom Equipment: Shower chair without back  Prior Level of Function:  Prior Function Per Pt/Caregiver Report  Level of [place]: Independent with ADLs and functional transfers, Independent with homemaking with ambulation  ADL Assistance: Independent  Homemaking Assistance: Independent  Ambulatory Assistance:  (occassional use of cane when back hurts \"not very often\")  Vocational: Full time employment ([employer] )  Precautions:  Precautions  Medical Precautions: Fall precautions    Vital Signs (Past 2hrs)        Date/Time Vitals Session Patient Position Pulse Resp SpO2 BP MAP (mmHg)    09/13/24 0800 --  --  --  17  --  --  --     09/13/24 0811 --  --  67  21  96 %  --  --                         Objective   Pain:  Pain Assessment  Pain Assessment: 0-10  0-10 (Numeric) Pain Score: 4  Pain Type: Chronic pain  Pain Location: Back  Pain Interventions: Repositioned (RN aware)  Cognition:  Cognition  Overall Cognitive Status: Within Functional Limits    General Assessments:  General Observation  General Observation: pleasant/cooperative     Activity Tolerance  Endurance: Decreased tolerance for upright " activites    Sensation  Light Touch: No apparent deficits    Strength  Strength Comments: B LEs > 3/5 observed        Static Sitting Balance  Static Sitting-Level of Assistance: Distant supervision  Dynamic Sitting Balance  Dynamic Sitting-Comments: no LOB    Static Standing Balance  Static Standing-Level of Assistance: Close supervision  Static Standing-Comment/Number of Minutes: UE support of walker  Dynamic Standing Balance  Dynamic Standing-Comments: no major LOB with UE support of walker during ambulation  Functional Assessments:  Bed Mobility  Bed Mobility: Yes  Bed Mobility 1  Bed Mobility 1: Supine to sitting  Level of Assistance 1: Close supervision  Bed Mobility Comments 1: use of bedrails    Transfers  Transfer: Yes  Transfer 1  Technique 1: Sit to stand, Stand to sit  Transfer Device 1: Walker  Transfer Level of Assistance 1: Close supervision    Ambulation/Gait Training  Ambulation/Gait Training Performed: Yes  Ambulation/Gait Training 1  Surface 1: Level tile  Device 1: Rolling walker  Assistance 1: Distant supervision  Quality of Gait 1:  (decreased mikayla)  Comments/Distance (ft) 1: about 200 ft  Extremity/Trunk Assessments:  RLE   RLE : Within Functional Limits  LLE   LLE : Within Functional Limits  Outcome Measures:  [facility] Basic Mobility  Turning from your back to your side while in a flat bed without using bedrails: None  Moving from lying on your back to sitting on the side of a flat bed without using bedrails: None  Moving to and from bed to chair (including a wheelchair): A little  Standing up from a chair using your arms (e.g. wheelchair or bedside chair): None  To walk in hospital room: A little  Climbing 3-5 steps with railing: A lot  Basic Mobility - Total Score: 20    Encounter Problems       Encounter Problems (Active)       Balance       dynamic (Progressing)       Start:  09/13/24    Expected End:  09/27/24       Pt will perform Tinetti assessment and score >/= 24/28, resulting in a  low falls risk             Mobility       LTG - Patient will navigate 4-6 steps with rails/device (Progressing)       Start:  09/13/24    Expected End:  09/27/24            endurance (Progressing)       Start:  09/13/24    Expected End:  09/27/24       Pt will tolerate 6 MWT with SUNNY rating 12-16         ambulation (Progressing)       Start:  09/13/24    Expected End:  09/27/24       Pt will amb > 400 ft with LRAD and mod independence             Pain - Adult          Safety       LTG - Patient will utilize safety techniques (Progressing)       Start:  09/13/24    Expected End:  09/27/24                   Education Documentation  Precautions, taught by Mehnaz Dover PT at 9/13/2024  9:16 AM.  Learner: Family, Patient  Readiness: Acceptance  Method: Explanation  Response: Verbalizes Understanding    Body Mechanics, taught by Mehnaz Dover PT at 9/13/2024  9:16 AM.  Learner: Family, Patient  Readiness: Acceptance  Method: Explanation  Response: Verbalizes Understanding    Mobility Training, taught by Mehnaz Dover PT at 9/13/2024  9:16 AM.  Learner: Family, Patient  Readiness: Acceptance  Method: Explanation  Response: Verbalizes Understanding    Education Comments  No comments found.

## 2024-09-13 NOTE — NURSING NOTE
Vascular access note    Patient with Rt arm dual lumen picc, dressing D&I, 1 lumen in use without difficulty, other lumen flushes easily and with positive blood return, curos cap applied.

## 2024-09-13 NOTE — PROGRESS NOTES
9/12 hida negative.   No acute surgical indications.  Discussed with patient.   Will sign off.   Will see patient in future upon request.

## 2024-09-13 NOTE — PROGRESS NOTES
Bereket Schaefer is a 48 y.o. male on day 3 of admission presenting with Hypokalemia.    Subjective   Patient currently lying in bed, no signs of distress.  He denies any abdominal pain, nausea, vomiting.       Objective     Physical Exam  HENT:      Head: Normocephalic.      Nose: Nose normal.      Mouth/Throat:      Mouth: Mucous membranes are moist.   Eyes:      Pupils: Pupils are equal, round, and reactive to light.   Cardiovascular:      Rate and Rhythm: Normal rate.   Pulmonary:      Effort: Pulmonary effort is normal.   Abdominal:      Palpations: Abdomen is soft.   Musculoskeletal:         General: Normal range of motion.      Cervical back: Normal range of motion.   Skin:     General: Skin is warm.   Neurological:      General: No focal deficit present.      Mental Status: He is alert.   Psychiatric:         Mood and Affect: Mood normal.         Last Recorded Vitals  Blood pressure (!) 134/119, pulse 67, temperature 36.7 °C (98.1 °F), temperature source Temporal, resp. rate 21, height 1.829 m (6'), weight 80.7 kg (177 lb 14.6 oz), SpO2 96%.  Intake/Output last 3 Shifts:  I/O last 3 completed shifts:  In: 5114.2 (62.9 mL/kg) [P.O.:320; I.V.:4044.2 (49.7 mL/kg); IV Piggyback:750]  Out: 1900 (23.4 mL/kg) [Urine:1900 (0.6 mL/kg/hr)]  Weight: 81.3 kg     Relevant Results  NM hepatobiliary w cholecystokinin    Result Date: 9/12/2024  Interpreted By:  Augustine Li, STUDY: NM HEPATOBILIARY W CHOLECYSTOKININ;  9/12/2024 10:09 pm   INDICATION: Signs/Symptoms:Rule out acute cholecystitis.   COMPARISON: None.   ACCESSION NUMBER(S): [id number]   ORDERING CLINICIAN: VIKTORIA VANEGAS   TECHNIQUE: DIVISION OF NUCLEAR MEDICINE HEPATOBILIARY SCAN (HIDA), QUANTITATIVE   The patient received an intravenous dose of 5.1 mCi of Tc-99m mebrofenin (Choletec).  Sequential images of the upper abdomen were then acquired over the next 60 minutes.  An intravenous infusion of the cholecystokinin (CCK) analogue, Sincalidewas then  administered followed by an additional period of imaging.  Computer quantification of gallbladder emptying was then performed.   FINDINGS: There is prompt radiotracer uptake in the liver and there is normal visualization of the gallbladder without scintigraphic evidence of acute cholecystitis. Ejection fraction was also calculated with an EF of 93%.       No scintigraphic evidence of acute cholecystitis.   MACRO: None   Signed by: Augustine Li 9/12/2024 11:58 PM Dictation workstation:   [id number]    US right upper quadrant    Result Date: 9/12/2024  Interpreted By:  Wang Mariee, STUDY: US RIGHT UPPER QUADRANT; 9/12/2024 3:24 pm   INDICATION: Signs/Symptoms:elevated LFTs.   COMPARISON: None.   ACCESSION NUMBER(S): [id number]   ORDERING CLINICIAN: TY BRIGGS   TECHNIQUE: Grayscale and color flow images were obtained of the right upper quadrant.   FINDINGS: The head and body of the pancreas are normal in appearance.  The pancreatic tail is  obscured by gas. There is a 5 mm simple cyst dome of the left lobe of the liver. Hepatic parenchyma is otherwise unremarkable in appearance.  No gallstones are identified.  Bladder wall is markedly thickened measuring up to 12 mm thick with edema like echogenicity within the wall. Patient was also tender upon palpation of the gallbladder with the ultrasound transducer. The common bile duct measures 2.2 mm. Visualized portions of the right kidney are unremarkable.       1. There is marked gallbladder wall thickening with what appears to be pericholecystic fluid and tenderness upon palpation of the gallbladder with the ultrasound transducer. The gallbladder wall thickening is a new finding compared to the CT from 09/10/2024 and raises concern for acute cholecystitis although no gallstones were identified. 2. There is no dilatation of the common duct. 3. Incidental note is made of a cyst within the left lobe of the liver near the dome.   MACRO: Wang Mariee communicated  finding via epic secure chat with Juan Luis Lorenzo MD on 9/12/2024 at 3:48 pm.  (**-RCF-**) Findings:  See findings.   Signed by: Wang Mariee 9/12/2024 3:52 PM Dictation workstation:   [id number]    Transthoracic Echo (TTE) Complete    Result Date: 9/11/2024            [address], [place] [id number]             Phone [phone] TRANSTHORACIC ECHOCARDIOGRAM REPORT Patient Name:     LADAN Campos Physician:  [id number] Padilla MD Study Date:       9/11/2024            Ordering Provider:  [id number] KEISHA PADILLA MRN/PID:          [id number]             Fellow: Accession#:       [id number]         Nurse: Date of           1975 / [birth year]      Sonographer:        Summer Ghotra RDCS Birth/Age:        years Gender:           M                    Additional Staff: Height:           182.88 cm            Admit Date: Weight:           73.48 kg             Admission Status:   Inpatient - Routine BSA / BMI:        1.95 m2 / 21.97      Department          [facility] ICU                   kg/m2                Location: Blood Pressure: 118 /73 mmHg Study Type:    TRANSTHORACIC ECHO (TTE) COMPLETE Diagnosis/ICD: Cardiomyopathy, unspecified-I42.9 Indication:    cardiomyopathy CPT Codes:     Echo Complete w Full Doppler-50043  Study Detail: The following Echo studies were performed: 2D, M-Mode, Doppler and               color flow.  PHYSICIAN INTERPRETATION: Left Ventricle: The left ventricular systolic function is normal, with a visually estimated ejection fraction of 55-60%. There are no regional wall motion abnormalities. The left ventricular cavity size is normal. Spectral Doppler shows a normal pattern of left ventricular diastolic filling. Left Atrium: The left atrium is normal in size. Right Ventricle: The right ventricle is normal in size. There is  normal right ventriclar wall thickness. There is normal right ventricular global systolic function. Right Atrium: The right atrium is normal in size. Aortic Valve: The aortic valve is trileaflet. The aortic valve dimensionless index is 0.82. There is no evidence of aortic valve regurgitation. The peak instantaneous gradient of the aortic valve is 9.2 mmHg. The mean gradient of the aortic valve is 5.0 mmHg. Mitral Valve: The mitral valve is normal in structure. There is normal mitral valve leaflet mobility. There is no evidence of mitral valve regurgitation. Tricuspid Valve: The tricuspid valve is structurally normal. There is normal tricuspid valve leaflet mobility. There is trace tricuspid regurgitation. Pulmonic Valve: The pulmonic valve is structurally normal. There is trace pulmonic valve regurgitation. Pericardium: There is no pericardial effusion noted. Aorta: The aortic root is normal. There is no dilatation of the aortic arch. There is no dilatation of the ascending aorta. There is no dilatation of the aortic root. Pulmonary Artery: The pulmonary artery is normal in size. The estimated pulmonary artery pressure is normal. Systemic Veins: The inferior vena cava appears moderately dilated, IVC inspiratory collapse greater than 50%.  CONCLUSIONS:  1. The left ventricular systolic function is normal, with a visually estimated ejection fraction of 55-60%.  2. There is normal right ventricular global systolic function.  3. The inferior vena cava appears moderately dilated, IVC inspiratory collapse greater than 50%. QUANTITATIVE DATA SUMMARY:  2D MEASUREMENTS:           Normal Ranges: LAs:             3.80 cm   (2.7-4.0cm) IVSd:            0.76 cm   (0.6-1.1cm) LVPWd:           0.99 cm   (0.6-1.1cm) LVIDd:           4.58 cm   (3.9-5.9cm) LVIDs:           3.41 cm LV Mass Index:   67.6 g/m2 LV % FS          25.5 %  LA VOLUME:                    Normal Ranges: LA Vol A4C:        38.7 ml    (22+/-6mL/m2) LA Vol A2C:         27.3 ml LA Vol BP:         34.3 ml LA Vol Index A4C:  19.9ml/m2 LA Vol Index A2C:  14.0 ml/m2 LA Vol Index BP:   17.6 ml/m2 LA Area A4C:       15.2 cm2 LA Area A2C:       12.1 cm2 LA Major Axis A4C: 5.1 cm LA Major Axis A2C: 4.6 cm LA Volume Index:   16.4 ml/m2 LA Vol A4C:        34.6 ml LA Vol A2C:        26.7 ml LA Vol Index BSA:  15.7 ml/m2  AORTA MEASUREMENTS:         Normal Ranges: Ao Sinus, d:        2.87 cm (2.1-3.5cm)  LV SYSTOLIC FUNCTION BY 2D PLANIMETRY (MOD):                      Normal Ranges: EF-A4C View:    51 % (>=55%) EF-Visual:      58 % LV EF Reported: 58 %  LV DIASTOLIC FUNCTION:            Normal Ranges: MV Peak E:             0.85 m/s   (0.7-1.2 m/s) MV Peak A:             0.62 m/s   (0.42-0.7 m/s) E/A Ratio:             1.37       (1.0-2.2) MV e'                  0.114 m/s  (>8.0) MV lateral e'          0.13 m/s MV medial e'           0.10 m/s E/e' Ratio:            7.41       (<8.0) PulmV Sys Fabrizio:         53.10 cm/s PulmV Connolly Fabrizio:        48.40 cm/s PulmV S/D Fabrizio:         1.10 PulmV A Revs Fabrizio:      29.60 cm/s  MITRAL VALVE:          Normal Ranges: MV DT:        259 msec (150-240msec)  AORTIC VALVE:                     Normal Ranges: AoV Vmax:                1.52 m/s (<=1.7m/s) AoV Peak P.2 mmHg (<20mmHg) AoV Mean P.0 mmHg (1.7-11.5mmHg) LVOT Max Fabrizio:            1.30 m/s (<=1.1m/s) AoV VTI:                 28.00 cm (18-25cm) LVOT VTI:                22.90 cm LVOT Diameter:           2.00 cm  (1.8-2.4cm) AoV Area, VTI:           2.57 cm2 (2.5-5.5cm2) AoV Area,Vmax:           2.69 cm2 (2.5-4.5cm2) AoV Dimensionless Index: 0.82  RIGHT VENTRICLE: TAPSE: 28.6 mm RV s'  0.14 m/s  TRICUSPID VALVE/RVSP:         Normal Ranges: IVC Diam:             2.72 cm  Pulmonary Veins: PulmV A Revs Fabrizio: 29.60 cm/s PulmV Connolly Fabrizio:   48.40 cm/s PulmV S/D Fabrizio:    1.10 PulmV Sys Fabrizio:    53.10 cm/s  [id number] Joe Padilla MD Electronically signed on 2024 at 5:11:14 PM  ** Final **      Bedside PICC Imaging    Result Date: 9/11/2024  These images are not reportable by radiology and will not be interpreted by  Radiologists.      Scheduled medications  enoxaparin, 40 mg, subcutaneous, q24h  escitalopram, 10 mg, oral, Daily  lidocaine, 5 mL, infiltration, Once  nicotine, 1 patch, transdermal, Daily  pantoprazole, 40 mg, oral, Daily before breakfast   Or  pantoprazole, 40 mg, intravenous, Daily before breakfast  piperacillin-tazobactam, 3.375 g, intravenous, q6h  potassium chloride, 40 mEq, oral, Once  potassium chloride, 40 mEq, intravenous, Once  potassium chloride CR, 40 mEq, oral, q4h      Continuous medications  sodium chloride, 125 mL/hr, Last Rate: 125 mL/hr (09/13/24 0420)      PRN medications  PRN medications: acetaminophen **OR** acetaminophen **OR** acetaminophen, alteplase, diphenhydrAMINE, HYDROmorphone, ketorolac, oxyCODONE  Results for orders placed or performed during the hospital encounter of 09/10/24 (from the past 24 hour(s))   PTH, Intact   Result Value Ref Range    Parathyroid Hormone, Intact 159.9 (H) 18.5 - 88.0 pg/mL   Basic Metabolic Panel   Result Value Ref Range    Glucose 161 (H) 65 - 99 mg/dL    Sodium 145 133 - 145 mmol/L    Potassium 2.6 (LL) 3.4 - 5.1 mmol/L    Chloride 111 (H) 97 - 107 mmol/L    Bicarbonate 24 24 - 31 mmol/L    Urea Nitrogen <3 (L) 8 - 25 mg/dL    Creatinine 0.70 0.40 - 1.60 mg/dL    eGFR >90 >60 mL/min/1.73m*2    Calcium 7.9 (L) 8.5 - 10.4 mg/dL    Anion Gap 10 <=19 mmol/L   Magnesium   Result Value Ref Range    Magnesium 2.10 1.60 - 3.10 mg/dL   Carcinoembryonic Antigen   Result Value Ref Range    Carcinoembryonic AG 1.3 ug/L   Hepatitis Panel, Acute   Result Value Ref Range    Hepatitis B Surface AG Nonreactive Nonreactive    Hepatitis A  AB- IgM Nonreactive Nonreactive    Hepatitis B Core AB; IgM Nonreactive Nonreactive    Hepatitis C AB Nonreactive Nonreactive   Renal function panel   Result Value Ref Range    Glucose 75 65 - 99 mg/dL     Sodium 144 133 - 145 mmol/L    Potassium 2.8 (LL) 3.4 - 5.1 mmol/L    Chloride 109 (H) 97 - 107 mmol/L    Bicarbonate 25 24 - 31 mmol/L    Urea Nitrogen <3 (L) 8 - 25 mg/dL    Creatinine 0.70 0.40 - 1.60 mg/dL    eGFR >90 >60 mL/min/1.73m*2    Calcium 7.7 (L) 8.5 - 10.4 mg/dL    Phosphorus 2.6 2.5 - 4.5 mg/dL    Albumin 3.0 (L) 3.5 - 5.0 g/dL    Anion Gap 10 <=19 mmol/L   Potassium   Result Value Ref Range    Potassium 3.1 (L) 3.4 - 5.1 mmol/L   CBC and Auto Differential   Result Value Ref Range    WBC 7.0 4.4 - 11.3 x10*3/uL    nRBC 0.0 0.0 - 0.0 /100 WBCs    RBC 3.53 (L) 4.50 - 5.90 x10*6/uL    Hemoglobin 10.7 (L) 13.5 - 17.5 g/dL    Hematocrit 30.8 (L) 41.0 - 52.0 %    MCV 87 80 - 100 fL    MCH 30.3 26.0 - 34.0 pg    MCHC 34.7 32.0 - 36.0 g/dL    RDW 15.7 (H) 11.5 - 14.5 %    Platelets 235 150 - 450 x10*3/uL    Neutrophils % 57.6 40.0 - 80.0 %    Immature Granulocytes %, Automated 0.4 0.0 - 0.9 %    Lymphocytes % 33.5 13.0 - 44.0 %    Monocytes % 5.5 2.0 - 10.0 %    Eosinophils % 2.4 0.0 - 6.0 %    Basophils % 0.6 0.0 - 2.0 %    Neutrophils Absolute 4.01 1.20 - 7.70 x10*3/uL    Immature Granulocytes Absolute, Automated 0.03 0.00 - 0.70 x10*3/uL    Lymphocytes Absolute 2.33 1.20 - 4.80 x10*3/uL    Monocytes Absolute 0.38 0.10 - 1.00 x10*3/uL    Eosinophils Absolute 0.17 0.00 - 0.70 x10*3/uL    Basophils Absolute 0.04 0.00 - 0.10 x10*3/uL   Comprehensive Metabolic Panel   Result Value Ref Range    Glucose 87 65 - 99 mg/dL    Sodium 143 133 - 145 mmol/L    Potassium 3.0 (L) 3.4 - 5.1 mmol/L    Chloride 110 (H) 97 - 107 mmol/L    Bicarbonate 24 24 - 31 mmol/L    Urea Nitrogen <3 (L) 8 - 25 mg/dL    Creatinine 0.70 0.40 - 1.60 mg/dL    eGFR >90 >60 mL/min/1.73m*2    Calcium 7.6 (L) 8.5 - 10.4 mg/dL    Albumin 2.7 (L) 3.5 - 5.0 g/dL    Alkaline Phosphatase 73 35 - 125 U/L    Total Protein 4.6 (L) 5.9 - 7.9 g/dL     (H) 5 - 40 U/L    Bilirubin, Total 0.5 0.1 - 1.2 mg/dL     (H) 5 - 40 U/L    Anion Gap 9  <=19 mmol/L   Magnesium   Result Value Ref Range    Magnesium 1.60 1.60 - 3.10 mg/dL   Phosphorus   Result Value Ref Range    Phosphorus 2.6 2.5 - 4.5 mg/dL   Creatine Kinase   Result Value Ref Range    Creatine Kinase 11,111 (H) 24 - 195 U/L   Potassium   Result Value Ref Range    Potassium 3.6 3.4 - 5.1 mmol/L                          Assessment/Plan   Assessment & Plan  Hypokalemia    Abnormal CT  CT abd/pelvis showed slight wall thickening of the cecum. There is also some thickening of the wall of the descending colon. Possible r/t severe hypokalemia.  Recommend iv hydration, correcting electrolytes including potassium, magnesium and phosphorus, eliminating all colonic hypomotility meds like opiods, benzos, anti-cholinergics, anti-histamines  out of bed, and increased mobility. Ultimately will should have colonoscopy at some point to r/o underlying neoplastic, infectious, or inflammatory conditions which can be done outpatient.  Nephrology following for possible primary hypoaldosteronism which can occur with hypertension and severe hypokalemia.   Continue supportive care.               -Add CEA negative               -No abdominal pain or bloody stools. Less likely ischemic      Elevated LFTs   [place] [address]. Likely component related to rhabdo               -Acute hep panel negative               -RUQ US  marked gallbladder wall thickening with what appears to be pericholecystic fluid.No gallstones. Noted cyst within the left lobe of the  liver near the dome.  HIDA was negative.  Patient was evaluated by surgery which has signed off . No improvement on LFTs, will continue to monitor. INR  1.1  Continue supportive measures    Marie Young, APRN-CNP

## 2024-09-14 LAB
ALBUMIN SERPL-MCNC: 3.3 G/DL (ref 3.5–5)
ALDOST SERPL-MCNC: <3 NG/DL
ALP BLD-CCNC: 86 U/L (ref 35–125)
ALT SERPL-CCNC: 136 U/L (ref 5–40)
ANION GAP SERPL CALC-SCNC: 10 MMOL/L
AST SERPL-CCNC: 268 U/L (ref 5–40)
BASOPHILS # BLD AUTO: 0.04 X10*3/UL (ref 0–0.1)
BASOPHILS NFR BLD AUTO: 0.5 %
BILIRUB SERPL-MCNC: 0.4 MG/DL (ref 0.1–1.2)
BUN SERPL-MCNC: <3 MG/DL (ref 8–25)
CALCIUM SERPL-MCNC: 8.3 MG/DL (ref 8.5–10.4)
CHLORIDE SERPL-SCNC: 109 MMOL/L (ref 97–107)
CK SERPL-CCNC: 8946 U/L (ref 24–195)
CO2 SERPL-SCNC: 24 MMOL/L (ref 24–31)
CREAT SERPL-MCNC: 0.7 MG/DL (ref 0.4–1.6)
EGFRCR SERPLBLD CKD-EPI 2021: >90 ML/MIN/1.73M*2
EOSINOPHIL # BLD AUTO: 0.21 X10*3/UL (ref 0–0.7)
EOSINOPHIL NFR BLD AUTO: 2.5 %
ERYTHROCYTE [DISTWIDTH] IN BLOOD BY AUTOMATED COUNT: 15.7 % (ref 11.5–14.5)
GLUCOSE SERPL-MCNC: 82 MG/DL (ref 65–99)
HCT VFR BLD AUTO: 34.1 % (ref 41–52)
HGB BLD-MCNC: 12 G/DL (ref 13.5–17.5)
IMM GRANULOCYTES # BLD AUTO: 0.05 X10*3/UL (ref 0–0.7)
IMM GRANULOCYTES NFR BLD AUTO: 0.6 % (ref 0–0.9)
LYMPHOCYTES # BLD AUTO: 2.35 X10*3/UL (ref 1.2–4.8)
LYMPHOCYTES NFR BLD AUTO: 27.5 %
MAGNESIUM SERPL-MCNC: 1.4 MG/DL (ref 1.6–3.1)
MCH RBC QN AUTO: 31.2 PG (ref 26–34)
MCHC RBC AUTO-ENTMCNC: 35.2 G/DL (ref 32–36)
MCV RBC AUTO: 89 FL (ref 80–100)
MONOCYTES # BLD AUTO: 0.51 X10*3/UL (ref 0.1–1)
MONOCYTES NFR BLD AUTO: 6 %
NEUTROPHILS # BLD AUTO: 5.39 X10*3/UL (ref 1.2–7.7)
NEUTROPHILS NFR BLD AUTO: 62.9 %
NRBC BLD-RTO: ABNORMAL /100{WBCS}
PHOSPHATE SERPL-MCNC: 2.4 MG/DL (ref 2.5–4.5)
PLATELET # BLD AUTO: 288 X10*3/UL (ref 150–450)
POTASSIUM SERPL-SCNC: 3.3 MMOL/L (ref 3.4–5.1)
PROT SERPL-MCNC: 5.4 G/DL (ref 5.9–7.9)
RBC # BLD AUTO: 3.85 X10*6/UL (ref 4.5–5.9)
SODIUM SERPL-SCNC: 143 MMOL/L (ref 133–145)
WBC # BLD AUTO: 8.6 X10*3/UL (ref 4.4–11.3)

## 2024-09-14 PROCEDURE — 84520 ASSAY OF UREA NITROGEN: CPT | Performed by: STUDENT IN AN ORGANIZED HEALTH CARE EDUCATION/TRAINING PROGRAM

## 2024-09-14 PROCEDURE — 2500000004 HC RX 250 GENERAL PHARMACY W/ HCPCS (ALT 636 FOR OP/ED): Performed by: STUDENT IN AN ORGANIZED HEALTH CARE EDUCATION/TRAINING PROGRAM

## 2024-09-14 PROCEDURE — 99232 SBSQ HOSP IP/OBS MODERATE 35: CPT | Performed by: STUDENT IN AN ORGANIZED HEALTH CARE EDUCATION/TRAINING PROGRAM

## 2024-09-14 PROCEDURE — 87493 C DIFF AMPLIFIED PROBE: CPT

## 2024-09-14 PROCEDURE — 2500000002 HC RX 250 W HCPCS SELF ADMINISTERED DRUGS (ALT 637 FOR MEDICARE OP, ALT 636 FOR OP/ED): Performed by: STUDENT IN AN ORGANIZED HEALTH CARE EDUCATION/TRAINING PROGRAM

## 2024-09-14 PROCEDURE — 99407 BEHAV CHNG SMOKING > 10 MIN: CPT | Performed by: INTERNAL MEDICINE

## 2024-09-14 PROCEDURE — 84100 ASSAY OF PHOSPHORUS: CPT | Performed by: STUDENT IN AN ORGANIZED HEALTH CARE EDUCATION/TRAINING PROGRAM

## 2024-09-14 PROCEDURE — 1200000002 HC GENERAL ROOM WITH TELEMETRY DAILY

## 2024-09-14 PROCEDURE — 85025 COMPLETE CBC W/AUTO DIFF WBC: CPT | Performed by: STUDENT IN AN ORGANIZED HEALTH CARE EDUCATION/TRAINING PROGRAM

## 2024-09-14 PROCEDURE — 87506 IADNA-DNA/RNA PROBE TQ 6-11: CPT | Mod: TRILAB,WESLAB

## 2024-09-14 PROCEDURE — 2500000001 HC RX 250 WO HCPCS SELF ADMINISTERED DRUGS (ALT 637 FOR MEDICARE OP): Performed by: STUDENT IN AN ORGANIZED HEALTH CARE EDUCATION/TRAINING PROGRAM

## 2024-09-14 PROCEDURE — 2500000001 HC RX 250 WO HCPCS SELF ADMINISTERED DRUGS (ALT 637 FOR MEDICARE OP): Performed by: INTERNAL MEDICINE

## 2024-09-14 PROCEDURE — 2500000004 HC RX 250 GENERAL PHARMACY W/ HCPCS (ALT 636 FOR OP/ED): Performed by: INTERNAL MEDICINE

## 2024-09-14 PROCEDURE — 83735 ASSAY OF MAGNESIUM: CPT | Performed by: STUDENT IN AN ORGANIZED HEALTH CARE EDUCATION/TRAINING PROGRAM

## 2024-09-14 PROCEDURE — 82550 ASSAY OF CK (CPK): CPT | Performed by: INTERNAL MEDICINE

## 2024-09-14 PROCEDURE — S4991 NICOTINE PATCH NONLEGEND: HCPCS | Performed by: STUDENT IN AN ORGANIZED HEALTH CARE EDUCATION/TRAINING PROGRAM

## 2024-09-14 PROCEDURE — 99233 SBSQ HOSP IP/OBS HIGH 50: CPT | Performed by: INTERNAL MEDICINE

## 2024-09-14 RX ORDER — MAGNESIUM SULFATE HEPTAHYDRATE 40 MG/ML
2 INJECTION, SOLUTION INTRAVENOUS ONCE
Status: COMPLETED | OUTPATIENT
Start: 2024-09-14 | End: 2024-09-14

## 2024-09-14 RX ORDER — LANOLIN ALCOHOL/MO/W.PET/CERES
400 CREAM (GRAM) TOPICAL 2 TIMES DAILY
Status: DISCONTINUED | OUTPATIENT
Start: 2024-09-14 | End: 2024-09-16 | Stop reason: HOSPADM

## 2024-09-14 RX ORDER — POTASSIUM CHLORIDE 1.5 G/1.58G
20 POWDER, FOR SOLUTION ORAL
Status: DISCONTINUED | OUTPATIENT
Start: 2024-09-14 | End: 2024-09-14

## 2024-09-14 RX ORDER — POTASSIUM CHLORIDE 14.9 MG/ML
20 INJECTION INTRAVENOUS
Status: COMPLETED | OUTPATIENT
Start: 2024-09-14 | End: 2024-09-14

## 2024-09-14 RX ORDER — LOPERAMIDE HYDROCHLORIDE 2 MG/1
2 CAPSULE ORAL 4 TIMES DAILY PRN
Status: DISCONTINUED | OUTPATIENT
Start: 2024-09-14 | End: 2024-09-16 | Stop reason: HOSPADM

## 2024-09-14 RX ORDER — POTASSIUM CHLORIDE 1.5 G/1.58G
40 POWDER, FOR SOLUTION ORAL
Status: DISCONTINUED | OUTPATIENT
Start: 2024-09-14 | End: 2024-09-16 | Stop reason: HOSPADM

## 2024-09-14 ASSESSMENT — COGNITIVE AND FUNCTIONAL STATUS - GENERAL
DAILY ACTIVITIY SCORE: 24
MOBILITY SCORE: 24
MOBILITY SCORE: 24
DAILY ACTIVITIY SCORE: 24

## 2024-09-14 ASSESSMENT — PAIN - FUNCTIONAL ASSESSMENT
PAIN_FUNCTIONAL_ASSESSMENT: 0-10
PAIN_FUNCTIONAL_ASSESSMENT: UNABLE TO SELF-REPORT
PAIN_FUNCTIONAL_ASSESSMENT: 0-10

## 2024-09-14 ASSESSMENT — PAIN DESCRIPTION - ORIENTATION
ORIENTATION: LOWER
ORIENTATION: MID;LOWER

## 2024-09-14 ASSESSMENT — PAIN DESCRIPTION - LOCATION
LOCATION: BACK

## 2024-09-14 ASSESSMENT — PAIN SCALES - GENERAL
PAINLEVEL_OUTOF10: 8
PAINLEVEL_OUTOF10: 8
PAINLEVEL_OUTOF10: 7
PAINLEVEL_OUTOF10: 8
PAINLEVEL_OUTOF10: 0 - NO PAIN
PAINLEVEL_OUTOF10: 8
PAINLEVEL_OUTOF10: 9
PAINLEVEL_OUTOF10: 8
PAINLEVEL_OUTOF10: 5 - MODERATE PAIN
PAINLEVEL_OUTOF10: 8
PAINLEVEL_OUTOF10: 0 - NO PAIN

## 2024-09-14 ASSESSMENT — PAIN DESCRIPTION - DESCRIPTORS: DESCRIPTORS: ACHING;DISCOMFORT;DULL

## 2024-09-14 NOTE — CARE PLAN
The patient's goals for the shift include pain control.    The clinical goals for the shift include stabalized potassium and other electrolytes.    Over the shift, the patient did not make progress toward the following goals. Barriers to progression include . Recommendations to address these barriers include .

## 2024-09-14 NOTE — PROGRESS NOTES
Bereket Schaefer is a 48 y.o. male on day 4 of admission presenting with Hypokalemia.      Subjective   Seen and examined.  He feels significantly better.  He is having diarrhea for the last 2 days    .       Objective     Last Recorded Vitals  /86 (BP Location: Right arm, Patient Position: Sitting)   Pulse 68   Temp 36.9 °C (98.4 °F) (Temporal)   Resp 18   Wt 80.7 kg (177 lb 14.6 oz)   SpO2 99%   Intake/Output last 3 Shifts:    Intake/Output Summary (Last 24 hours) at 9/14/2024 1240  Last data filed at 9/14/2024 1100  Gross per 24 hour   Intake 1503.75 ml   Output 250 ml   Net 1253.75 ml       Admission Weight  Weight: 73.5 kg (162 lb 0.6 oz) (09/10/24 1152)    Daily Weight  09/13/24 : 80.7 kg (177 lb 14.6 oz)    Image Results  NM hepatobiliary w cholecystokinin  Narrative: Interpreted By:  Augustine Li,   STUDY:  NM HEPATOBILIARY W CHOLECYSTOKININ;  9/12/2024 10:09 pm      INDICATION:  Signs/Symptoms:Rule out acute cholecystitis.      COMPARISON:  None.      ACCESSION NUMBER(S):  [id number]      ORDERING CLINICIAN:  VIKTORIA VANEGAS      TECHNIQUE:  DIVISION OF NUCLEAR MEDICINE  HEPATOBILIARY SCAN (HIDA), QUANTITATIVE      The patient received an intravenous dose of 5.1 mCi of Tc-99m  mebrofenin (Choletec).  Sequential images of the upper abdomen were  then acquired over the next 60 minutes.  An intravenous infusion of  the cholecystokinin (CCK) analogue, Sincalidewas then administered  followed by an additional period of imaging.  Computer quantification  of gallbladder emptying was then performed.      FINDINGS:  There is prompt radiotracer uptake in the liver and there is normal  visualization of the gallbladder without scintigraphic evidence of  acute cholecystitis. Ejection fraction was also calculated with an EF  of 93%.      Impression: No scintigraphic evidence of acute cholecystitis.      MACRO:  None      Signed by: Augustine Li 9/12/2024 11:58 PM  Dictation workstation:   [id number]  US right  upper quadrant  Narrative: Interpreted By:  Wang Mariee,   STUDY:  US RIGHT UPPER QUADRANT; 9/12/2024 3:24 pm      INDICATION:  Signs/Symptoms:elevated LFTs.      COMPARISON:  None.      ACCESSION NUMBER(S):  [id number]      ORDERING CLINICIAN:  TY BRIGGS      TECHNIQUE:  Grayscale and color flow images were obtained of the right upper  quadrant.      FINDINGS:  The head and body of the pancreas are normal in appearance.  The  pancreatic tail is  obscured by gas. There is a 5 mm simple cyst dome  of the left lobe of the liver. Hepatic parenchyma is otherwise  unremarkable in appearance.  No gallstones are identified.  Bladder  wall is markedly thickened measuring up to 12 mm thick with edema  like echogenicity within the wall. Patient was also tender upon  palpation of the gallbladder with the ultrasound transducer. The  common bile duct measures 2.2 mm. Visualized portions of the right  kidney are unremarkable.      Impression: 1. There is marked gallbladder wall thickening with what appears to  be pericholecystic fluid and tenderness upon palpation of the  gallbladder with the ultrasound transducer. The gallbladder wall  thickening is a new finding compared to the CT from 09/10/2024 and  raises concern for acute cholecystitis although no gallstones were  identified.  2. There is no dilatation of the common duct.  3. Incidental note is made of a cyst within the left lobe of the  liver near the dome.      MACRO:  Wang Mariee communicated finding via epic secure chat with  Juan Luis Lorenzo MD on 9/12/2024 at 3:48 pm.  (**-RCF-**)  Findings:  See findings.      Signed by: Wang Mariee 9/12/2024 3:52 PM  Dictation workstation:   [id number]      Physical Exam  Constitutional:       Appearance: Normal appearance.   HENT:      Head: Normocephalic and atraumatic.      Nose: Nose normal.   Eyes:      Extraocular Movements: Extraocular movements intact.      Pupils: Pupils are equal, round, and reactive  to light.   Cardiovascular:      Rate and Rhythm: Normal rate and regular rhythm.      Heart sounds: Normal heart sounds.   Pulmonary:      Effort: Pulmonary effort is normal.      Breath sounds: Normal breath sounds.   Abdominal:      General: Bowel sounds are normal.      Palpations: Abdomen is soft.   Musculoskeletal:      Cervical back: Neck supple.   Skin:     General: Skin is warm and dry.   Neurological:      General: No focal deficit present.      Mental Status: He is alert. Mental status is at baseline.   Psychiatric:         Mood and Affect: Mood normal.         Relevant Results               Assessment/Plan        This patient has a central line   Reason for the central line remaining today? Parenteral nutrition            Assessment & Plan  Hypokalemia      Severe hypokalemia.  Replaced.  Potassium is low today but patient is also having diarrhea.  Will continue to replace potassium.    Acute rhabdomyolysis.  Continue IV fluids.  CK is coming down.  Nephrology on board.    Chronic back pain.  Nothing acute and nothing unusual.    Hypocalcemia replaced.    Hypomagnesemia will be replaced as well.    Elevated liver function test.  Continue to monitor. Ultrasound showed marked gallbladder wall thickening with possible pericholecystic fluid.  This was followed by HIDA scan that showed no significant evidence of acute cholecystitis.  Will continue to monitor liver function test.              Joi Randhawa MD

## 2024-09-14 NOTE — PROGRESS NOTES
Bereket Schaefer is a 48 y.o. male on day 4 of admission presenting with Hypokalemia.    Subjective   Patient reports he has had some loose stool and a few episodes of diarrhea, however as of this a.m. stool is becoming more formed.  Denies any abdominal pain, nausea, vomiting       Objective     Physical Exam  HENT:      Head: Normocephalic.      Nose: Nose normal.      Mouth/Throat:      Mouth: Mucous membranes are moist.   Eyes:      Pupils: Pupils are equal, round, and reactive to light.   Cardiovascular:      Rate and Rhythm: Normal rate.   Pulmonary:      Breath sounds: Normal breath sounds.   Abdominal:      Palpations: Abdomen is soft.   Musculoskeletal:         General: Normal range of motion.      Cervical back: Normal range of motion.   Skin:     General: Skin is warm.   Neurological:      General: No focal deficit present.      Mental Status: He is alert.         Last Recorded Vitals  Blood pressure 151/86, pulse 68, temperature 36.9 °C (98.4 °F), temperature source Temporal, resp. rate 18, height 1.829 m (6'), weight 80.7 kg (177 lb 14.6 oz), SpO2 99%.  Intake/Output last 3 Shifts:  I/O last 3 completed shifts:  In: 4074.2 (50.5 mL/kg) [P.O.:1220; I.V.:2354.2 (29.2 mL/kg); IV Piggyback:500]  Out: 1175 (14.6 mL/kg) [Urine:1175 (0.4 mL/kg/hr)]  Weight: 80.7 kg     NM hepatobiliary w cholecystokinin    Result Date: 9/12/2024  Interpreted By:  Augustine Li, STUDY: NM HEPATOBILIARY W CHOLECYSTOKININ;  9/12/2024 10:09 pm   INDICATION: Signs/Symptoms:Rule out acute cholecystitis.   COMPARISON: None.   ACCESSION NUMBER(S): [id number]   ORDERING CLINICIAN: VIKTORIA VANEGAS   TECHNIQUE: DIVISION OF NUCLEAR MEDICINE HEPATOBILIARY SCAN (HIDA), QUANTITATIVE   The patient received an intravenous dose of 5.1 mCi of Tc-99m mebrofenin (Choletec).  Sequential images of the upper abdomen were then acquired over the next 60 minutes.  An intravenous infusion of the cholecystokinin (CCK) analogue, Sincalidewas then administered  followed by an additional period of imaging.  Computer quantification of gallbladder emptying was then performed.   FINDINGS: There is prompt radiotracer uptake in the liver and there is normal visualization of the gallbladder without scintigraphic evidence of acute cholecystitis. Ejection fraction was also calculated with an EF of 93%.       No scintigraphic evidence of acute cholecystitis.   MACRO: None   Signed by: Augustine Li 9/12/2024 11:58 PM Dictation workstation:   [id number]    US right upper quadrant    Result Date: 9/12/2024  Interpreted By:  Wang Mariee, STUDY: US RIGHT UPPER QUADRANT; 9/12/2024 3:24 pm   INDICATION: Signs/Symptoms:elevated LFTs.   COMPARISON: None.   ACCESSION NUMBER(S): [id number]   ORDERING CLINICIAN: TY BRIGGS   TECHNIQUE: Grayscale and color flow images were obtained of the right upper quadrant.   FINDINGS: The head and body of the pancreas are normal in appearance.  The pancreatic tail is  obscured by gas. There is a 5 mm simple cyst dome of the left lobe of the liver. Hepatic parenchyma is otherwise unremarkable in appearance.  No gallstones are identified.  Bladder wall is markedly thickened measuring up to 12 mm thick with edema like echogenicity within the wall. Patient was also tender upon palpation of the gallbladder with the ultrasound transducer. The common bile duct measures 2.2 mm. Visualized portions of the right kidney are unremarkable.       1. There is marked gallbladder wall thickening with what appears to be pericholecystic fluid and tenderness upon palpation of the gallbladder with the ultrasound transducer. The gallbladder wall thickening is a new finding compared to the CT from 09/10/2024 and raises concern for acute cholecystitis although no gallstones were identified. 2. There is no dilatation of the common duct. 3. Incidental note is made of a cyst within the left lobe of the liver near the dome.   MACRO: Wang Mariee communicated finding via  epic secure chat with Juan Luis Lorenzo MD on 9/12/2024 at 3:48 pm.  (**-RCF-**) Findings:  See findings.   Signed by: Wang Mariee 9/12/2024 3:52 PM Dictation workstation:   [id number]      Scheduled medications  enoxaparin, 40 mg, subcutaneous, q24h  escitalopram, 10 mg, oral, Daily  lidocaine, 5 mL, infiltration, Once  magnesium oxide, 400 mg, oral, BID  nicotine, 1 patch, transdermal, Daily  pantoprazole, 40 mg, oral, Daily before breakfast   Or  pantoprazole, 40 mg, intravenous, Daily before breakfast  piperacillin-tazobactam, 3.375 g, intravenous, q6h  potassium chloride, 40 mEq, oral, BID after meals  potassium chloride, 20 mEq, intravenous, q2h      Continuous medications  sodium chloride, 125 mL/hr, Last Rate: 125 mL/hr (09/14/24 0651)      PRN medications  PRN medications: acetaminophen **OR** acetaminophen **OR** acetaminophen, alteplase, diphenhydrAMINE, HYDROmorphone, loperamide, oxyCODONE  Results for orders placed or performed during the hospital encounter of 09/10/24 (from the past 24 hour(s))   Basic Metabolic Panel   Result Value Ref Range    Glucose 122 (H) 65 - 99 mg/dL    Sodium 144 133 - 145 mmol/L    Potassium 4.0 3.4 - 5.1 mmol/L    Chloride 112 (H) 97 - 107 mmol/L    Bicarbonate 23 (L) 24 - 31 mmol/L    Urea Nitrogen <3 (L) 8 - 25 mg/dL    Creatinine 0.70 0.40 - 1.60 mg/dL    eGFR >90 >60 mL/min/1.73m*2    Calcium 8.2 (L) 8.5 - 10.4 mg/dL    Anion Gap 9 <=19 mmol/L   Creatine Kinase   Result Value Ref Range    Creatine Kinase 8,946 (H) 24 - 195 U/L   CBC and Auto Differential   Result Value Ref Range    WBC 8.6 4.4 - 11.3 x10*3/uL    nRBC      RBC 3.85 (L) 4.50 - 5.90 x10*6/uL    Hemoglobin 12.0 (L) 13.5 - 17.5 g/dL    Hematocrit 34.1 (L) 41.0 - 52.0 %    MCV 89 80 - 100 fL    MCH 31.2 26.0 - 34.0 pg    MCHC 35.2 32.0 - 36.0 g/dL    RDW 15.7 (H) 11.5 - 14.5 %    Platelets 288 150 - 450 x10*3/uL    Neutrophils % 62.9 40.0 - 80.0 %    Immature Granulocytes %, Automated 0.6 0.0 - 0.9 %     Lymphocytes % 27.5 13.0 - 44.0 %    Monocytes % 6.0 2.0 - 10.0 %    Eosinophils % 2.5 0.0 - 6.0 %    Basophils % 0.5 0.0 - 2.0 %    Neutrophils Absolute 5.39 1.20 - 7.70 x10*3/uL    Immature Granulocytes Absolute, Automated 0.05 0.00 - 0.70 x10*3/uL    Lymphocytes Absolute 2.35 1.20 - 4.80 x10*3/uL    Monocytes Absolute 0.51 0.10 - 1.00 x10*3/uL    Eosinophils Absolute 0.21 0.00 - 0.70 x10*3/uL    Basophils Absolute 0.04 0.00 - 0.10 x10*3/uL   Comprehensive Metabolic Panel   Result Value Ref Range    Glucose 82 65 - 99 mg/dL    Sodium 143 133 - 145 mmol/L    Potassium 3.3 (L) 3.4 - 5.1 mmol/L    Chloride 109 (H) 97 - 107 mmol/L    Bicarbonate 24 24 - 31 mmol/L    Urea Nitrogen <3 (L) 8 - 25 mg/dL    Creatinine 0.70 0.40 - 1.60 mg/dL    eGFR >90 >60 mL/min/1.73m*2    Calcium 8.3 (L) 8.5 - 10.4 mg/dL    Albumin 3.3 (L) 3.5 - 5.0 g/dL    Alkaline Phosphatase 86 35 - 125 U/L    Total Protein 5.4 (L) 5.9 - 7.9 g/dL     (H) 5 - 40 U/L    Bilirubin, Total 0.4 0.1 - 1.2 mg/dL     (H) 5 - 40 U/L    Anion Gap 10 <=19 mmol/L   Magnesium   Result Value Ref Range    Magnesium 1.40 (L) 1.60 - 3.10 mg/dL   Phosphorus   Result Value Ref Range    Phosphorus 2.4 (L) 2.5 - 4.5 mg/dL                            Assessment/Plan   Assessment & Plan  Hypokalemia    Abnormal CT  CT abd/pelvis showed slight wall thickening of the cecum. There is also some thickening of the wall of the descending colon. Possible r/t severe hypokalemia.  Recommend iv hydration, correcting electrolytes including potassium, magnesium and phosphorus, eliminating all colonic hypomotility meds like opiods, benzos, anti-cholinergics, anti-histamines  out of bed, and increased mobility. Ultimately will should have colonoscopy at some point to r/o underlying neoplastic, infectious, or inflammatory conditions which can be done outpatient.  Nephrology following for possible primary hypoaldosteronism which can occur with hypertension and severe hypokalemia.    Continue supportive care.               - CEA negative               -No abdominal pain or bloody stools. Less likely ischemic      Elevated LFTs   [place] [address]. Likely component related to rhabdo               -Acute hep panel negative               -RUQ US  marked gallbladder wall thickening with what appears to be pericholecystic fluid.No gallstones. Noted cyst within the left lobe of the  liver near the dome.  HIDA was negative.  Patient was evaluated by surgery which has signed off . No improvement on LFTs, will continue to monitor. INR  1.1  Continue supportive measures    9/14   With reports of diarrhea and loose stool, will order stool studies.  However could be related to antibiotics.  He denies any fever, chills.  No leukocytosis. Denies any bloody stool H&H stable.  CK slightly improved, Continue IV fluids.     Marie Young, APRN-CNP

## 2024-09-14 NOTE — PROGRESS NOTES
Bereket Schaefer is a 48 y.o. male on day 4 of admission presenting with Hypokalemia.    Subjective    patient was seen for profound hypokalemia and acute rhabdomyolysis he feels a whole lot better today no complaints       Objective     Physical Exam  Constitutional:       Comments:  patient is moderate distress because of the pain in his hands because of the spasm   Neck:      Vascular: No carotid bruit.   Cardiovascular:      Rate and Rhythm: Normal rate and regular rhythm.      Heart sounds: No murmur heard.     No friction rub. No gallop.   Pulmonary:      Breath sounds: No wheezing, rhonchi or rales.   Chest:      Chest wall: No tenderness.   Abdominal:      General: There is no distension.      Tenderness: There is no abdominal tenderness. There is no guarding or rebound.   Musculoskeletal:         General: No swelling or tenderness.      Cervical back: Neck supple.      Right lower leg: No edema.      Left lower leg: No edema.      Comments:  patient does have  shvostsign in both hands with severe cramping   Lymphadenopathy:      Cervical: No cervical adenopathy.         Last Recorded Vitals  Blood pressure 151/86, pulse 68, temperature 36.9 °C (98.4 °F), temperature source Temporal, resp. rate 18, height 1.829 m (6'), weight 80.7 kg (177 lb 14.6 oz), SpO2 99%.    Intake/Output last 3 Shifts:  I/O last 3 completed shifts:  In: 4074.2 (50.5 mL/kg) [P.O.:1220; I.V.:2354.2 (29.2 mL/kg); IV Piggyback:500]  Out: 1175 (14.6 mL/kg) [Urine:1175 (0.4 mL/kg/hr)]  Weight: 80.7 kg     Current Facility-Administered Medications:     acetaminophen (Tylenol) tablet 650 mg, 650 mg, oral, q4h PRN **OR** acetaminophen (Tylenol) oral liquid 650 mg, 650 mg, oral, q4h PRN **OR** acetaminophen (Tylenol) suppository 650 mg, 650 mg, rectal, q4h PRN, Cherrie Henriquez MD    alteplase (Cathflo Activase) injection 2 mg, 2 mg, intra-catheter, PRN, Cherrie Henriquez MD    diphenhydrAMINE (Sominex) tablet 25 mg, 25 mg, oral, q6h PRN, Cherrie  MD [place], 25 mg at 09/13/24 2037    enoxaparin (Lovenox) syringe 40 mg, 40 mg, subcutaneous, q24h, Cherrie Henriquez MD, 40 mg at 09/13/24 2036    escitalopram (Lexapro) tablet 10 mg, 10 mg, oral, Daily, Cherrie Henriquez MD, 10 mg at 09/13/24 2037    HYDROmorphone (Dilaudid) injection 1 mg, 1 mg, intravenous, q4h PRN, Cherrie Henriquez MD, 1 mg at 09/14/24 1112    lidocaine (Xylocaine) 10 mg/mL (1 %) injection 5 mL, 5 mL, infiltration, Once, Cherrie Henriquez MD    magnesium oxide (Mag-Ox) tablet 400 mg, 400 mg, oral, BID, Khris Fuentes MD, 400 mg at 09/14/24 1112    magnesium sulfate 2 g in sterile water for injection 50 mL, 2 g, intravenous, Once, Khris Fuentes MD, Last Rate: 25 mL/hr at 09/14/24 1112, 2 g at 09/14/24 1112    nicotine (Nicoderm CQ) 21 mg/24 hr patch 1 patch, 1 patch, transdermal, Daily, Cherrie Henriquez MD, 1 patch at 09/14/24 0748    oxyCODONE (Roxicodone) immediate release tablet 5 mg, 5 mg, oral, q6h PRN, Cherrie Henriquez MD, 5 mg at 09/14/24 0748    pantoprazole (ProtoNix) EC tablet 40 mg, 40 mg, oral, Daily before breakfast, 40 mg at 09/14/24 0648 **OR** pantoprazole (ProtoNix) injection 40 mg, 40 mg, intravenous, Daily before breakfast, Cherrie Henriquez MD, 40 mg at 09/13/24 0602    piperacillin-tazobactam (Zosyn) 3.375 g in dextrose (iso) IV 50 mL, 3.375 g, intravenous, q6h, Cherrie Henriquez MD, Stopped at 09/14/24 0722    potassium chloride (Klor-Con) packet 40 mEq, 40 mEq, oral, BID after meals, Ward Garcia MD    sodium chloride 0.45 % infusion, 125 mL/hr, intravenous, Continuous, Cherrie Henriquez MD, Last Rate: 125 mL/hr at 09/14/24 0651, 125 mL/hr at 09/14/24 0651   Relevant Results    Results for orders placed or performed during the hospital encounter of 09/10/24 (from the past 96 hour(s))   CBC and Auto Differential   Result Value Ref Range    WBC 10.0 4.4 - 11.3 x10*3/uL    nRBC 0.0 0.0 - 0.0 /100 WBCs    RBC 4.96 4.50 - 5.90 x10*6/uL    Hemoglobin 15.0  13.5 - 17.5 g/dL    Hematocrit 41.3 41.0 - 52.0 %    MCV 83 80 - 100 fL    MCH 30.2 26.0 - 34.0 pg    MCHC 36.3 (H) 32.0 - 36.0 g/dL    RDW 14.9 (H) 11.5 - 14.5 %    Platelets 365 150 - 450 x10*3/uL    Neutrophils % 64.6 40.0 - 80.0 %    Immature Granulocytes %, Automated 0.3 0.0 - 0.9 %    Lymphocytes % 24.3 13.0 - 44.0 %    Monocytes % 8.9 2.0 - 10.0 %    Eosinophils % 1.4 0.0 - 6.0 %    Basophils % 0.5 0.0 - 2.0 %    Neutrophils Absolute 6.45 1.20 - 7.70 x10*3/uL    Immature Granulocytes Absolute, Automated 0.03 0.00 - 0.70 x10*3/uL    Lymphocytes Absolute 2.43 1.20 - 4.80 x10*3/uL    Monocytes Absolute 0.89 0.10 - 1.00 x10*3/uL    Eosinophils Absolute 0.14 0.00 - 0.70 x10*3/uL    Basophils Absolute 0.05 0.00 - 0.10 x10*3/uL   Sars-CoV-2 PCR   Result Value Ref Range    Coronavirus 2019, PCR Not Detected Not Detected   Urinalysis with Reflex Culture and Microscopic   Result Value Ref Range    Color, Urine Yellow Light-Yellow, Yellow, Dark-Yellow    Appearance, Urine Clear Clear    Specific Gravity, Urine 1.019 1.005 - 1.035    pH, Urine 6.5 5.0, 5.5, 6.0, 6.5, 7.0, 7.5, 8.0    Protein, Urine 100 (2+) (A) NEGATIVE, 10 (TRACE), 20 (TRACE) mg/dL    Glucose, Urine Normal Normal mg/dL    Blood, Urine OVER (3+) (A) NEGATIVE    Ketones, Urine NEGATIVE NEGATIVE mg/dL    Bilirubin, Urine NEGATIVE NEGATIVE    Urobilinogen, Urine Normal Normal mg/dL    Nitrite, Urine NEGATIVE NEGATIVE    Leukocyte Esterase, Urine 25 Adin/µL (A) NEGATIVE   Microscopic Only, Urine   Result Value Ref Range    WBC, Urine 6-10 (A) 1-5, NONE /HPF    RBC, Urine NONE NONE, 1-2, 3-5 /HPF    Squamous Epithelial Cells, Urine 1-9 (SPARSE) Reference range not established. /HPF    Mucus, Urine FEW Reference range not established. /LPF   Urine Culture    Specimen: Clean Catch/Voided; Urine   Result Value Ref Range    Urine Culture No growth    Basic metabolic panel   Result Value Ref Range    Glucose 127 (H) 65 - 99 mg/dL    Sodium 144 133 - 145 mmol/L     Potassium <1.0 (LL) 3.4 - 5.1 mmol/L    Chloride 103 97 - 107 mmol/L    Bicarbonate 28 24 - 31 mmol/L    Urea Nitrogen 11 8 - 25 mg/dL    Creatinine 0.90 0.40 - 1.60 mg/dL    eGFR >90 >60 mL/min/1.73m*2    Calcium 8.4 (L) 8.5 - 10.4 mg/dL    Anion Gap 13 <=19 mmol/L   Magnesium   Result Value Ref Range    Magnesium 1.80 1.60 - 3.10 mg/dL   Phosphorus   Result Value Ref Range    Phosphorus 1.5 (L) 2.5 - 4.5 mg/dL   Basic metabolic panel   Result Value Ref Range    Glucose 102 (H) 65 - 99 mg/dL    Sodium 145 133 - 145 mmol/L    Potassium <1.5 (LL) 3.4 - 5.1 mmol/L    Chloride 105 97 - 107 mmol/L    Bicarbonate 28 24 - 31 mmol/L    Urea Nitrogen 10 8 - 25 mg/dL    Creatinine 0.90 0.40 - 1.60 mg/dL    eGFR >90 >60 mL/min/1.73m*2    Calcium 8.3 (L) 8.5 - 10.4 mg/dL    Anion Gap 12 <=19 mmol/L   Creatine Kinase   Result Value Ref Range    Creatine Kinase 3,711 (H) 24 - 195 U/L   Urinalysis with Reflex Microscopic   Result Value Ref Range    Color, Urine Light-Yellow Light-Yellow, Yellow, Dark-Yellow    Appearance, Urine Clear Clear    Specific Gravity, Urine 1.032 1.005 - 1.035    pH, Urine 7.0 5.0, 5.5, 6.0, 6.5, 7.0, 7.5, 8.0    Protein, Urine 20 (TRACE) NEGATIVE, 10 (TRACE), 20 (TRACE) mg/dL    Glucose, Urine Normal Normal mg/dL    Blood, Urine 1.0 (3+) (A) NEGATIVE    Ketones, Urine NEGATIVE NEGATIVE mg/dL    Bilirubin, Urine NEGATIVE NEGATIVE    Urobilinogen, Urine Normal Normal mg/dL    Nitrite, Urine NEGATIVE NEGATIVE    Leukocyte Esterase, Urine NEGATIVE NEGATIVE   Microscopic Only, Urine   Result Value Ref Range    WBC, Urine 1-5 1-5, NONE /HPF    RBC, Urine NONE NONE, 1-2, 3-5 /HPF   NT-PROBNP   Result Value Ref Range    PROBNP     Serial Troponin, Initial ([facility])   Result Value Ref Range    Troponin T, High Sensitivity     Hemoglobin A1C   Result Value Ref Range    Hemoglobin A1C 5.6 See below %    Estimated Average Glucose 114 Not Established mg/dL   Type And Screen   Result Value Ref Range    ABO TYPE B     Rh  TYPE POS     ANTIBODY SCREEN NEG    Protime-INR   Result Value Ref Range    Protime 11.2 9.3 - 12.7 seconds    INR 1.1 0.9 - 1.2   Blood Culture    Specimen: Peripheral Venipuncture; Blood culture   Result Value Ref Range    Blood Culture No growth at 3 days    Blood Culture    Specimen: Peripheral Venipuncture; Blood culture   Result Value Ref Range    Blood Culture No growth at 3 days    Abo/Rh Group Test   Result Value Ref Range    ABO TYPE B     Rh TYPE POS    Potassium, Urine Random   Result Value Ref Range    Potassium, Urine Random 13 mmol/L    Creatinine, Urine Random 50.1 NOT ESTABLISHED mg/dL    Potassium/Creatinine Ratio 26 Not established mmol/g Creat   Comprehensive metabolic panel   Result Value Ref Range    Glucose 165 (H) 65 - 99 mg/dL    Sodium 144 133 - 145 mmol/L    Potassium 1.6 (LL) 3.4 - 5.1 mmol/L    Chloride 107 97 - 107 mmol/L    Bicarbonate 26 24 - 31 mmol/L    Urea Nitrogen 9 8 - 25 mg/dL    Creatinine 0.80 0.40 - 1.60 mg/dL    eGFR >90 >60 mL/min/1.73m*2    Calcium 7.6 (L) 8.5 - 10.4 mg/dL    Albumin 2.9 (L) 3.5 - 5.0 g/dL    Alkaline Phosphatase 80 35 - 125 U/L    Total Protein 4.7 (L) 5.9 - 7.9 g/dL     (H) 5 - 40 U/L    Bilirubin, Total 0.5 0.1 - 1.2 mg/dL    ALT 62 (H) 5 - 40 U/L    Anion Gap 11 <=19 mmol/L   Magnesium   Result Value Ref Range    Magnesium 1.70 1.60 - 3.10 mg/dL   Phosphorus   Result Value Ref Range    Phosphorus 1.9 (L) 2.5 - 4.5 mg/dL   Serial Troponin, 2 Hour ([facility])   Result Value Ref Range    Troponin T, High Sensitivity 78 (HH) <=14 ng/L   PST Top   Result Value Ref Range    Extra Tube Hold for add-ons.    Basic Metabolic Panel   Result Value Ref Range    Glucose 94 65 - 99 mg/dL    Sodium 146 (H) 133 - 145 mmol/L    Potassium 1.6 (LL) 3.4 - 5.1 mmol/L    Chloride 109 (H) 97 - 107 mmol/L    Bicarbonate 25 24 - 31 mmol/L    Urea Nitrogen 10 8 - 25 mg/dL    Creatinine 0.80 0.40 - 1.60 mg/dL    eGFR >90 >60 mL/min/1.73m*2    Calcium 7.2 (L) 8.5 - 10.4 mg/dL     Anion Gap 12 <=19 mmol/L   Phosphorus   Result Value Ref Range    Phosphorus 1.7 (L) 2.5 - 4.5 mg/dL   Magnesium   Result Value Ref Range    Magnesium 1.50 (L) 1.60 - 3.10 mg/dL   Serial Troponin, 6 Hour ([facility])   Result Value Ref Range    Troponin T, High Sensitivity 75 (HH) <=14 ng/L   Phosphorus   Result Value Ref Range    Phosphorus 2.3 (L) 2.5 - 4.5 mg/dL   Basic Metabolic Panel   Result Value Ref Range    Glucose 108 (H) 65 - 99 mg/dL    Sodium 144 133 - 145 mmol/L    Potassium 1.8 (LL) 3.4 - 5.1 mmol/L    Chloride 107 97 - 107 mmol/L    Bicarbonate 26 24 - 31 mmol/L    Urea Nitrogen 10 8 - 25 mg/dL    Creatinine 0.80 0.40 - 1.60 mg/dL    eGFR >90 >60 mL/min/1.73m*2    Calcium 7.5 (L) 8.5 - 10.4 mg/dL    Anion Gap 11 <=19 mmol/L   Magnesium   Result Value Ref Range    Magnesium 1.90 1.60 - 3.10 mg/dL   Basic Metabolic Panel   Result Value Ref Range    Glucose 114 (H) 65 - 99 mg/dL    Sodium 145 133 - 145 mmol/L    Potassium 1.7 (LL) 3.4 - 5.1 mmol/L    Chloride 108 (H) 97 - 107 mmol/L    Bicarbonate 25 24 - 31 mmol/L    Urea Nitrogen 9 8 - 25 mg/dL    Creatinine 0.80 0.40 - 1.60 mg/dL    eGFR >90 >60 mL/min/1.73m*2    Calcium 7.2 (L) 8.5 - 10.4 mg/dL    Anion Gap 12 <=19 mmol/L   Phosphorus   Result Value Ref Range    Phosphorus 2.9 2.5 - 4.5 mg/dL   Magnesium   Result Value Ref Range    Magnesium 1.70 1.60 - 3.10 mg/dL   CBC   Result Value Ref Range    WBC 8.3 4.4 - 11.3 x10*3/uL    nRBC 0.0 0.0 - 0.0 /100 WBCs    RBC 3.69 (L) 4.50 - 5.90 x10*6/uL    Hemoglobin 11.1 (L) 13.5 - 17.5 g/dL    Hematocrit 30.7 (L) 41.0 - 52.0 %    MCV 83 80 - 100 fL    MCH 30.1 26.0 - 34.0 pg    MCHC 36.2 (H) 32.0 - 36.0 g/dL    RDW 15.2 (H) 11.5 - 14.5 %    Platelets 246 150 - 450 x10*3/uL   Comprehensive metabolic panel   Result Value Ref Range    Glucose 112 (H) 65 - 99 mg/dL    Sodium 143 133 - 145 mmol/L    Potassium 1.9 (LL) 3.4 - 5.1 mmol/L    Chloride 107 97 - 107 mmol/L    Bicarbonate 26 24 - 31 mmol/L    Urea Nitrogen  9 8 - 25 mg/dL    Creatinine 0.80 0.40 - 1.60 mg/dL    eGFR >90 >60 mL/min/1.73m*2    Calcium 7.7 (L) 8.5 - 10.4 mg/dL    Albumin 2.8 (L) 3.5 - 5.0 g/dL    Alkaline Phosphatase 78 35 - 125 U/L    Total Protein 5.1 (L) 5.9 - 7.9 g/dL     (H) 5 - 40 U/L    Bilirubin, Total 0.5 0.1 - 1.2 mg/dL    ALT 67 (H) 5 - 40 U/L    Anion Gap 10 <=19 mmol/L   Phosphorus   Result Value Ref Range    Phosphorus 3.1 2.5 - 4.5 mg/dL   Magnesium   Result Value Ref Range    Magnesium 1.90 1.60 - 3.10 mg/dL   Creatine Kinase   Result Value Ref Range    Creatine Kinase 5,486 (H) 24 - 195 U/L   Basic Metabolic Panel   Result Value Ref Range    Glucose 100 (H) 65 - 99 mg/dL    Sodium 147 (H) 133 - 145 mmol/L    Potassium 2.0 (LL) 3.4 - 5.1 mmol/L    Chloride 110 (H) 97 - 107 mmol/L    Bicarbonate 26 24 - 31 mmol/L    Urea Nitrogen 8 8 - 25 mg/dL    Creatinine 0.80 0.40 - 1.60 mg/dL    eGFR >90 >60 mL/min/1.73m*2    Calcium 7.7 (L) 8.5 - 10.4 mg/dL    Anion Gap 11 <=19 mmol/L   Basic Metabolic Panel   Result Value Ref Range    Glucose 117 (H) 65 - 99 mg/dL    Sodium 145 133 - 145 mmol/L    Potassium 2.3 (LL) 3.4 - 5.1 mmol/L    Chloride 110 (H) 97 - 107 mmol/L    Bicarbonate 22 (L) 24 - 31 mmol/L    Urea Nitrogen 7 (L) 8 - 25 mg/dL    Creatinine 0.80 0.40 - 1.60 mg/dL    eGFR >90 >60 mL/min/1.73m*2    Calcium 7.5 (L) 8.5 - 10.4 mg/dL    Anion Gap 13 <=19 mmol/L   Drug Screen, Urine   Result Value Ref Range    Amphetamine Screen, Urine Negative      Barbiturate Screen, Urine Negative      Benzodiazepines Screen, Urine Negative      Cannabinoid Screen, Urine Negative      Cocaine Metabolite Screen, Urine Negative      Fentanyl Screen, Urine Negative       Methadone Screen, Urine Negative      Opiate Screen, Urine Positive (A)      Oxycodone Screen, Urine Positive (A)      PCP Screen, Urine Negative     Basic Metabolic Panel   Result Value Ref Range    Glucose 128 (H) 65 - 99 mg/dL    Sodium 146 (H) 133 - 145 mmol/L    Potassium 1.9 (LL)  3.4 - 5.1 mmol/L    Chloride 111 (H) 97 - 107 mmol/L    Bicarbonate 24 24 - 31 mmol/L    Urea Nitrogen 7 (L) 8 - 25 mg/dL    Creatinine 0.80 0.40 - 1.60 mg/dL    eGFR >90 >60 mL/min/1.73m*2    Calcium 7.3 (L) 8.5 - 10.4 mg/dL    Anion Gap 11 <=19 mmol/L   Basic Metabolic Panel   Result Value Ref Range    Glucose 120 (H) 65 - 99 mg/dL    Sodium 145 133 - 145 mmol/L    Potassium 1.9 (LL) 3.4 - 5.1 mmol/L    Chloride 108 (H) 97 - 107 mmol/L    Bicarbonate 25 24 - 31 mmol/L    Urea Nitrogen 7 (L) 8 - 25 mg/dL    Creatinine 0.80 0.40 - 1.60 mg/dL    eGFR >90 >60 mL/min/1.73m*2    Calcium 7.7 (L) 8.5 - 10.4 mg/dL    Anion Gap 12 <=19 mmol/L   C-Reactive Protein, High Sensitivity   Result Value Ref Range    CRP, High Sensitivity 8.2 (H) <1.0 mg/L   FRENCH with Reflex to TUNDE   Result Value Ref Range    FRENCH Negative Negative   Aldolase   Result Value Ref Range    Aldolase 49.0 (H) 1.2 - 7.6 U/L   Basic Metabolic Panel   Result Value Ref Range    Glucose 145 (H) 65 - 99 mg/dL    Sodium 146 (H) 133 - 145 mmol/L    Potassium 2.0 (LL) 3.4 - 5.1 mmol/L    Chloride 110 (H) 97 - 107 mmol/L    Bicarbonate 24 24 - 31 mmol/L    Urea Nitrogen 6 (L) 8 - 25 mg/dL    Creatinine 0.90 0.40 - 1.60 mg/dL    eGFR >90 >60 mL/min/1.73m*2    Calcium 7.7 (L) 8.5 - 10.4 mg/dL    Anion Gap 12 <=19 mmol/L   Carcinoembryonic Antigen   Result Value Ref Range    Carcinoembryonic AG 1.4 ug/L   Transthoracic Echo (TTE) Complete   Result Value Ref Range    AV pk azar 1.52 m/s    LVOT diam 2.00 cm    AV mn grad 5.0 mmHg    MV E/A ratio 1.37     Tricuspid annular plane systolic excursion 2.9 cm    LA vol index A/L 17.6 ml/m2    LV EF 58 %    RV free wall pk S' 13.50 cm/s    LVIDd 4.58 cm    AV pk grad 9.2 mmHg    Aortic Valve Area by Continuity of VTI 2.57 cm2    Aortic Valve Area by Continuity of Peak Velocity 2.69 cm2    LV A4C EF 50.6    Basic Metabolic Panel   Result Value Ref Range    Glucose 155 (H) 65 - 99 mg/dL    Sodium 146 (H) 133 - 145 mmol/L     Potassium 2.0 (LL) 3.4 - 5.1 mmol/L    Chloride 109 (H) 97 - 107 mmol/L    Bicarbonate 24 24 - 31 mmol/L    Urea Nitrogen 6 (L) 8 - 25 mg/dL    Creatinine 0.80 0.40 - 1.60 mg/dL    eGFR >90 >60 mL/min/1.73m*2    Calcium 7.8 (L) 8.5 - 10.4 mg/dL    Anion Gap 13 <=19 mmol/L   Basic Metabolic Panel   Result Value Ref Range    Glucose 135 (H) 65 - 99 mg/dL    Sodium 145 133 - 145 mmol/L    Potassium 2.0 (LL) 3.4 - 5.1 mmol/L    Chloride 108 (H) 97 - 107 mmol/L    Bicarbonate 24 24 - 31 mmol/L    Urea Nitrogen 5 (L) 8 - 25 mg/dL    Creatinine 0.80 0.40 - 1.60 mg/dL    eGFR >90 >60 mL/min/1.73m*2    Calcium 7.9 (L) 8.5 - 10.4 mg/dL    Anion Gap 13 <=19 mmol/L   Basic metabolic panel   Result Value Ref Range    Glucose 128 (H) 65 - 99 mg/dL    Sodium 144 133 - 145 mmol/L    Potassium 2.2 (LL) 3.4 - 5.1 mmol/L    Chloride 108 (H) 97 - 107 mmol/L    Bicarbonate 24 24 - 31 mmol/L    Urea Nitrogen 5 (L) 8 - 25 mg/dL    Creatinine 0.70 0.40 - 1.60 mg/dL    eGFR >90 >60 mL/min/1.73m*2    Calcium 7.6 (L) 8.5 - 10.4 mg/dL    Anion Gap 12 <=19 mmol/L   Basic Metabolic Panel   Result Value Ref Range    Glucose 128 (H) 65 - 99 mg/dL    Sodium 147 (H) 133 - 145 mmol/L    Potassium 2.3 (LL) 3.4 - 5.1 mmol/L    Chloride 112 (H) 97 - 107 mmol/L    Bicarbonate 24 24 - 31 mmol/L    Urea Nitrogen 4 (L) 8 - 25 mg/dL    Creatinine 0.70 0.40 - 1.60 mg/dL    eGFR >90 >60 mL/min/1.73m*2    Calcium 7.5 (L) 8.5 - 10.4 mg/dL    Anion Gap 11 <=19 mmol/L   Creatine Kinase   Result Value Ref Range    Creatine Kinase 10,923 (H) 24 - 195 U/L   CBC and Auto Differential   Result Value Ref Range    WBC 7.6 4.4 - 11.3 x10*3/uL    nRBC 0.0 0.0 - 0.0 /100 WBCs    RBC 3.52 (L) 4.50 - 5.90 x10*6/uL    Hemoglobin 10.6 (L) 13.5 - 17.5 g/dL    Hematocrit 30.2 (L) 41.0 - 52.0 %    MCV 86 80 - 100 fL    MCH 30.1 26.0 - 34.0 pg    MCHC 35.1 32.0 - 36.0 g/dL    RDW 15.7 (H) 11.5 - 14.5 %    Platelets 237 150 - 450 x10*3/uL    Neutrophils % 56.9 40.0 - 80.0 %     Immature Granulocytes %, Automated 0.3 0.0 - 0.9 %    Lymphocytes % 33.3 13.0 - 44.0 %    Monocytes % 6.9 2.0 - 10.0 %    Eosinophils % 2.1 0.0 - 6.0 %    Basophils % 0.5 0.0 - 2.0 %    Neutrophils Absolute 4.34 1.20 - 7.70 x10*3/uL    Immature Granulocytes Absolute, Automated 0.02 0.00 - 0.70 x10*3/uL    Lymphocytes Absolute 2.54 1.20 - 4.80 x10*3/uL    Monocytes Absolute 0.53 0.10 - 1.00 x10*3/uL    Eosinophils Absolute 0.16 0.00 - 0.70 x10*3/uL    Basophils Absolute 0.04 0.00 - 0.10 x10*3/uL   Comprehensive Metabolic Panel   Result Value Ref Range    Glucose 102 (H) 65 - 99 mg/dL    Sodium 146 (H) 133 - 145 mmol/L    Potassium 2.4 (LL) 3.4 - 5.1 mmol/L    Chloride 112 (H) 97 - 107 mmol/L    Bicarbonate 24 24 - 31 mmol/L    Urea Nitrogen 4 (L) 8 - 25 mg/dL    Creatinine 0.80 0.40 - 1.60 mg/dL    eGFR >90 >60 mL/min/1.73m*2    Calcium 7.4 (L) 8.5 - 10.4 mg/dL    Albumin 2.8 (L) 3.5 - 5.0 g/dL    Alkaline Phosphatase 72 35 - 125 U/L    Total Protein 4.5 (L) 5.9 - 7.9 g/dL     (H) 5 - 40 U/L    Bilirubin, Total 0.5 0.1 - 1.2 mg/dL    ALT 87 (H) 5 - 40 U/L    Anion Gap 10 <=19 mmol/L   Magnesium   Result Value Ref Range    Magnesium 1.60 1.60 - 3.10 mg/dL   Phosphorus   Result Value Ref Range    Phosphorus 2.6 2.5 - 4.5 mg/dL   Basic Metabolic Panel   Result Value Ref Range    Glucose 102 (H) 65 - 99 mg/dL    Sodium 146 (H) 133 - 145 mmol/L    Potassium 2.4 (LL) 3.4 - 5.1 mmol/L    Chloride 112 (H) 97 - 107 mmol/L    Bicarbonate 24 24 - 31 mmol/L    Urea Nitrogen 4 (L) 8 - 25 mg/dL    Creatinine 0.80 0.40 - 1.60 mg/dL    eGFR >90 >60 mL/min/1.73m*2    Calcium 7.4 (L) 8.5 - 10.4 mg/dL    Anion Gap 10 <=19 mmol/L   Sedimentation Rate   Result Value Ref Range    Sedimentation Rate 6 0 - 15 mm/h   Basic Metabolic Panel   Result Value Ref Range    Glucose 95 65 - 99 mg/dL    Sodium 148 (H) 133 - 145 mmol/L    Potassium 2.8 (LL) 3.4 - 5.1 mmol/L    Chloride 113 (H) 97 - 107 mmol/L    Bicarbonate 24 24 - 31 mmol/L     Urea Nitrogen 3 (L) 8 - 25 mg/dL    Creatinine 0.80 0.40 - 1.60 mg/dL    eGFR >90 >60 mL/min/1.73m*2    Calcium 7.3 (L) 8.5 - 10.4 mg/dL    Anion Gap 11 <=19 mmol/L   Calcium, ionized   Result Value Ref Range    POCT Calcium, Ionized 1.07 (L) 1.1 - 1.33 mmol/L   Blood Gas Arterial Full Panel   Result Value Ref Range    POCT pH, Arterial 7.45 (H) 7.38 - 7.42 pH    POCT pCO2, Arterial 33 (L) 38 - 42 mm Hg    POCT pO2, Arterial      POCT SO2, Arterial 95 94 - 100 %    POCT Oxy Hemoglobin, Arterial 92.3 (L) 94.0 - 98.0 %    POCT Hematocrit Calculated, Arterial 34.0 (L) 41.0 - 52.0 %    POCT Sodium, Arterial 144 136 - 145 mmol/L    POCT Potassium, Arterial 2.8 (LL) 3.5 - 5.3 mmol/L    POCT Chloride, Arterial 113 (H) 98 - 107 mmol/L    POCT Ionized Calcium, Arterial 1.10 1.10 - 1.33 mmol/L    POCT Glucose, Arterial 132 (H) 74 - 99 mg/dL    POCT Lactate, Arterial 1.7 0.4 - 2.0 mmol/L    POCT Base Excess, Arterial -0.6 -2.0 - 3.0 mmol/L    POCT HCO3 Calculated, Arterial 22.9 22.0 - 26.0 mmol/L    POCT Hemoglobin, Arterial 11.3 (L) 13.5 - 17.5 g/dL    POCT Anion Gap, Arterial 11 10 - 25 mmo/L    Patient Temperature 37.0 degrees Celsius    FiO2 21 %    Critical Called By SANDRA HERNÁNDEZ     Critical Called To DR HARVEYONV     Critical Call Time 1100     Critical Read Back Y     Critical Note K     Site of Arterial Puncture Radial Left     Geovanny's Test Positive    PTH, Intact   Result Value Ref Range    Parathyroid Hormone, Intact 159.9 (H) 18.5 - 88.0 pg/mL   Basic Metabolic Panel   Result Value Ref Range    Glucose 161 (H) 65 - 99 mg/dL    Sodium 145 133 - 145 mmol/L    Potassium 2.6 (LL) 3.4 - 5.1 mmol/L    Chloride 111 (H) 97 - 107 mmol/L    Bicarbonate 24 24 - 31 mmol/L    Urea Nitrogen <3 (L) 8 - 25 mg/dL    Creatinine 0.70 0.40 - 1.60 mg/dL    eGFR >90 >60 mL/min/1.73m*2    Calcium 7.9 (L) 8.5 - 10.4 mg/dL    Anion Gap 10 <=19 mmol/L   Magnesium   Result Value Ref Range    Magnesium 2.10 1.60 - 3.10 mg/dL    Carcinoembryonic Antigen   Result Value Ref Range    Carcinoembryonic AG 1.3 ug/L   Hepatitis Panel, Acute   Result Value Ref Range    Hepatitis B Surface AG Nonreactive Nonreactive    Hepatitis A  AB- IgM Nonreactive Nonreactive    Hepatitis B Core AB; IgM Nonreactive Nonreactive    Hepatitis C AB Nonreactive Nonreactive   Renal function panel   Result Value Ref Range    Glucose 75 65 - 99 mg/dL    Sodium 144 133 - 145 mmol/L    Potassium 2.8 (LL) 3.4 - 5.1 mmol/L    Chloride 109 (H) 97 - 107 mmol/L    Bicarbonate 25 24 - 31 mmol/L    Urea Nitrogen <3 (L) 8 - 25 mg/dL    Creatinine 0.70 0.40 - 1.60 mg/dL    eGFR >90 >60 mL/min/1.73m*2    Calcium 7.7 (L) 8.5 - 10.4 mg/dL    Phosphorus 2.6 2.5 - 4.5 mg/dL    Albumin 3.0 (L) 3.5 - 5.0 g/dL    Anion Gap 10 <=19 mmol/L   Potassium   Result Value Ref Range    Potassium 3.1 (L) 3.4 - 5.1 mmol/L   CBC and Auto Differential   Result Value Ref Range    WBC 7.0 4.4 - 11.3 x10*3/uL    nRBC 0.0 0.0 - 0.0 /100 WBCs    RBC 3.53 (L) 4.50 - 5.90 x10*6/uL    Hemoglobin 10.7 (L) 13.5 - 17.5 g/dL    Hematocrit 30.8 (L) 41.0 - 52.0 %    MCV 87 80 - 100 fL    MCH 30.3 26.0 - 34.0 pg    MCHC 34.7 32.0 - 36.0 g/dL    RDW 15.7 (H) 11.5 - 14.5 %    Platelets 235 150 - 450 x10*3/uL    Neutrophils % 57.6 40.0 - 80.0 %    Immature Granulocytes %, Automated 0.4 0.0 - 0.9 %    Lymphocytes % 33.5 13.0 - 44.0 %    Monocytes % 5.5 2.0 - 10.0 %    Eosinophils % 2.4 0.0 - 6.0 %    Basophils % 0.6 0.0 - 2.0 %    Neutrophils Absolute 4.01 1.20 - 7.70 x10*3/uL    Immature Granulocytes Absolute, Automated 0.03 0.00 - 0.70 x10*3/uL    Lymphocytes Absolute 2.33 1.20 - 4.80 x10*3/uL    Monocytes Absolute 0.38 0.10 - 1.00 x10*3/uL    Eosinophils Absolute 0.17 0.00 - 0.70 x10*3/uL    Basophils Absolute 0.04 0.00 - 0.10 x10*3/uL   Comprehensive Metabolic Panel   Result Value Ref Range    Glucose 87 65 - 99 mg/dL    Sodium 143 133 - 145 mmol/L    Potassium 3.0 (L) 3.4 - 5.1 mmol/L    Chloride 110 (H) 97 - 107  mmol/L    Bicarbonate 24 24 - 31 mmol/L    Urea Nitrogen <3 (L) 8 - 25 mg/dL    Creatinine 0.70 0.40 - 1.60 mg/dL    eGFR >90 >60 mL/min/1.73m*2    Calcium 7.6 (L) 8.5 - 10.4 mg/dL    Albumin 2.7 (L) 3.5 - 5.0 g/dL    Alkaline Phosphatase 73 35 - 125 U/L    Total Protein 4.6 (L) 5.9 - 7.9 g/dL     (H) 5 - 40 U/L    Bilirubin, Total 0.5 0.1 - 1.2 mg/dL     (H) 5 - 40 U/L    Anion Gap 9 <=19 mmol/L   Magnesium   Result Value Ref Range    Magnesium 1.60 1.60 - 3.10 mg/dL   Phosphorus   Result Value Ref Range    Phosphorus 2.6 2.5 - 4.5 mg/dL   Creatine Kinase   Result Value Ref Range    Creatine Kinase 11,111 (H) 24 - 195 U/L   Potassium   Result Value Ref Range    Potassium 3.6 3.4 - 5.1 mmol/L   Basic Metabolic Panel   Result Value Ref Range    Glucose 122 (H) 65 - 99 mg/dL    Sodium 144 133 - 145 mmol/L    Potassium 4.0 3.4 - 5.1 mmol/L    Chloride 112 (H) 97 - 107 mmol/L    Bicarbonate 23 (L) 24 - 31 mmol/L    Urea Nitrogen <3 (L) 8 - 25 mg/dL    Creatinine 0.70 0.40 - 1.60 mg/dL    eGFR >90 >60 mL/min/1.73m*2    Calcium 8.2 (L) 8.5 - 10.4 mg/dL    Anion Gap 9 <=19 mmol/L   Creatine Kinase   Result Value Ref Range    Creatine Kinase 8,946 (H) 24 - 195 U/L   CBC and Auto Differential   Result Value Ref Range    WBC 8.6 4.4 - 11.3 x10*3/uL    nRBC      RBC 3.85 (L) 4.50 - 5.90 x10*6/uL    Hemoglobin 12.0 (L) 13.5 - 17.5 g/dL    Hematocrit 34.1 (L) 41.0 - 52.0 %    MCV 89 80 - 100 fL    MCH 31.2 26.0 - 34.0 pg    MCHC 35.2 32.0 - 36.0 g/dL    RDW 15.7 (H) 11.5 - 14.5 %    Platelets 288 150 - 450 x10*3/uL    Neutrophils % 62.9 40.0 - 80.0 %    Immature Granulocytes %, Automated 0.6 0.0 - 0.9 %    Lymphocytes % 27.5 13.0 - 44.0 %    Monocytes % 6.0 2.0 - 10.0 %    Eosinophils % 2.5 0.0 - 6.0 %    Basophils % 0.5 0.0 - 2.0 %    Neutrophils Absolute 5.39 1.20 - 7.70 x10*3/uL    Immature Granulocytes Absolute, Automated 0.05 0.00 - 0.70 x10*3/uL    Lymphocytes Absolute 2.35 1.20 - 4.80 x10*3/uL    Monocytes  Absolute 0.51 0.10 - 1.00 x10*3/uL    Eosinophils Absolute 0.21 0.00 - 0.70 x10*3/uL    Basophils Absolute 0.04 0.00 - 0.10 x10*3/uL   Comprehensive Metabolic Panel   Result Value Ref Range    Glucose 82 65 - 99 mg/dL    Sodium 143 133 - 145 mmol/L    Potassium 3.3 (L) 3.4 - 5.1 mmol/L    Chloride 109 (H) 97 - 107 mmol/L    Bicarbonate 24 24 - 31 mmol/L    Urea Nitrogen <3 (L) 8 - 25 mg/dL    Creatinine 0.70 0.40 - 1.60 mg/dL    eGFR >90 >60 mL/min/1.73m*2    Calcium 8.3 (L) 8.5 - 10.4 mg/dL    Albumin 3.3 (L) 3.5 - 5.0 g/dL    Alkaline Phosphatase 86 35 - 125 U/L    Total Protein 5.4 (L) 5.9 - 7.9 g/dL     (H) 5 - 40 U/L    Bilirubin, Total 0.4 0.1 - 1.2 mg/dL     (H) 5 - 40 U/L    Anion Gap 10 <=19 mmol/L   Magnesium   Result Value Ref Range    Magnesium 1.40 (L) 1.60 - 3.10 mg/dL   Phosphorus   Result Value Ref Range    Phosphorus 2.4 (L) 2.5 - 4.5 mg/dL       Assessment/Plan   Profound hypokalemia improving workup is in progress  Hypertension rule out secondary form of hypertension from adrenal etiology  Acute rhabdomyolysis CPK started trending down  Hypophosphatemia  it is resolved  Hypomagnesemia solved   hypocalcemia is improving       Ward Garcia MD

## 2024-09-14 NOTE — CARE PLAN
Problem: Pain  Goal: Takes deep breaths with improved pain control throughout the shift  Outcome: Progressing  Goal: Turns in bed with improved pain control throughout the shift  Outcome: Progressing  Goal: Walks with improved pain control throughout the shift  Outcome: Progressing  Goal: Performs ADL's with improved pain control throughout shift  Outcome: Progressing  Goal: Participates in PT with improved pain control throughout the shift  Outcome: Progressing  Goal: Free from opioid side effects throughout the shift  Outcome: Progressing  Goal: Free from acute confusion related to pain meds throughout the shift  Outcome: Progressing     Problem: Discharge Planning  Goal: Discharge to home or other facility with appropriate resources  Outcome: Progressing     Problem: Chronic Conditions and Co-morbidities  Goal: Patient's chronic conditions and co-morbidity symptoms are monitored and maintained or improved  Outcome: Progressing     Problem: Skin  Goal: Decreased wound size/increased tissue granulation at next dressing change  Outcome: Progressing  Goal: Participates in plan/prevention/treatment measures  Outcome: Progressing  Goal: Prevent/manage excess moisture  Outcome: Progressing  Goal: Prevent/minimize sheer/friction injuries  Outcome: Progressing  Goal: Promote/optimize nutrition  Outcome: Progressing  Goal: Promote skin healing  Outcome: Progressing     Problem: Fall/Injury  Goal: Not fall by end of shift  Outcome: Progressing  Goal: Be free from injury by end of the shift  Outcome: Progressing  Goal: Verbalize understanding of personal risk factors for fall in the hospital  Outcome: Progressing  Goal: Verbalize understanding of risk factor reduction measures to prevent injury from fall in the home  Outcome: Progressing  Goal: Use assistive devices by end of the shift  Outcome: Progressing  Goal: Pace activities to prevent fatigue by end of the shift  Outcome: Progressing   The patient's goals for the  shift include get my potassium up    The clinical goals for the shift include Improved electolytes    Over the shift, the patient did not make progress toward the following goals. Barriers to progression include . Recommendations to address these barriers include .

## 2024-09-14 NOTE — PROGRESS NOTES
Subjective Data:  Patient with a history of hypertension, lisinopril.  As well as hyperlipidemia patient came with severe muscle weakness more likely acute myolysis unknown etiology.  May be from statin therapy.  Although he is on statin therapy for a long time.  Patient fathering a severe hypokalemia as well as magnesium.  Slowly that resolved.  Patient also has ongoing workup for hyperaldosteronism.  Currently getting IV fluid.  No active chest pain tightness wife at bedside.  Overnight Events:    None  Objective   Last Recorded Vitals  /77 (BP Location: Right arm, Patient Position: Lying)   Pulse 67   Temp 36.6 °C (97.9 °F) (Temporal)   Resp 17   Wt 80.7 kg (177 lb 14.6 oz)   SpO2 99%     Intake/Output Summary (Last 24 hours) at 9/14/2024 0959  Last data filed at 9/14/2024 0651  Gross per 24 hour   Intake 1423.75 ml   Output 550 ml   Net 873.75 ml     Physical Exam:  HEENT: Normocephalic/atraumatic pupils equal react light  Neck exam mild JVD, no bruit  Lung exam clear to auscultation, few crackles at the bases  Cardiac exam is regular rhythm S1-S2, soft systolic murmur heard.   Abdomen soft nontender, nondistended  Extremities no clubbing, cyanosis, trace edema  Neuro exam grossly intact.  Image Results  NM hepatobiliary w cholecystokinin  Narrative: Interpreted By:  Augustine Li,   STUDY:  NM HEPATOBILIARY W CHOLECYSTOKININ;  9/12/2024 10:09 pm      INDICATION:  Signs/Symptoms:Rule out acute cholecystitis.      COMPARISON:  None.      ACCESSION NUMBER(S):  [id number]      ORDERING CLINICIAN:  VIKTORIA VANEGAS      TECHNIQUE:  DIVISION OF NUCLEAR MEDICINE  HEPATOBILIARY SCAN (HIDA), QUANTITATIVE      The patient received an intravenous dose of 5.1 mCi of Tc-99m  mebrofenin (Choletec).  Sequential images of the upper abdomen were  then acquired over the next 60 minutes.  An intravenous infusion of  the cholecystokinin (CCK) analogue, Sincalidewas then administered  followed by an additional period of  imaging.  Computer quantification  of gallbladder emptying was then performed.      FINDINGS:  There is prompt radiotracer uptake in the liver and there is normal  visualization of the gallbladder without scintigraphic evidence of  acute cholecystitis. Ejection fraction was also calculated with an EF  of 93%.      Impression: No scintigraphic evidence of acute cholecystitis.      MACRO:  None      Signed by: Augustine Li 9/12/2024 11:58 PM  Dictation workstation:   [id number]  US right upper quadrant  Narrative: Interpreted By:  Wang Mariee,   STUDY:  US RIGHT UPPER QUADRANT; 9/12/2024 3:24 pm      INDICATION:  Signs/Symptoms:elevated LFTs.      COMPARISON:  None.      ACCESSION NUMBER(S):  [id number]      ORDERING CLINICIAN:  TY BRIGGS      TECHNIQUE:  Grayscale and color flow images were obtained of the right upper  quadrant.      FINDINGS:  The head and body of the pancreas are normal in appearance.  The  pancreatic tail is  obscured by gas. There is a 5 mm simple cyst dome  of the left lobe of the liver. Hepatic parenchyma is otherwise  unremarkable in appearance.  No gallstones are identified.  Bladder  wall is markedly thickened measuring up to 12 mm thick with edema  like echogenicity within the wall. Patient was also tender upon  palpation of the gallbladder with the ultrasound transducer. The  common bile duct measures 2.2 mm. Visualized portions of the right  kidney are unremarkable.      Impression: 1. There is marked gallbladder wall thickening with what appears to  be pericholecystic fluid and tenderness upon palpation of the  gallbladder with the ultrasound transducer. The gallbladder wall  thickening is a new finding compared to the CT from 09/10/2024 and  raises concern for acute cholecystitis although no gallstones were  identified.  2. There is no dilatation of the common duct.  3. Incidental note is made of a cyst within the left lobe of the  liver near the dome.      MACRO:  Wang  Kodi communicated finding via epic secure chat with  Juan Luis Lorenzo MD on 9/12/2024 at 3:48 pm.  (**-RCF-**)  Findings:  See findings.      Signed by: Wang Mariee 9/12/2024 3:52 PM  Dictation workstation:   [id number]    Last Labs:  CBC - 9/14/2024:  3:30 AM  8.6 12.0 288    34.1      CMP - 9/14/2024:  3:30 AM  8.3 5.4 268 --- 0.4   2.4 3.3 136 86      PTT - No results in last year.  1.1   11.2 _     Inpatient Medications:  Scheduled medications   Medication Dose Route Frequency    enoxaparin  40 mg subcutaneous q24h    escitalopram  10 mg oral Daily    lidocaine  5 mL infiltration Once    nicotine  1 patch transdermal Daily    pantoprazole  40 mg oral Daily before breakfast    Or    pantoprazole  40 mg intravenous Daily before breakfast    piperacillin-tazobactam  3.375 g intravenous q6h     Principal Problem:    Hypokalemia    Assessment/Plan   48-year-old patient with a known history of acute rhabdomyolysis.  Also hypokalemia and hypomagnesemia.  Unknown etiology.  1.  Acute rhabdomyolysis: Continue IV hydration.  CK is coming down to 9000.  Continue monitoring.  2.  Hypokalemia.  Replace with potassium p.o. today potassium is 3.3.  Will give patient potassium daily 40 mill equal p.o. daily.  3.  Hypomagnesemia: Will also add patient on a 2 g magnesium sulfate IV 1 dose now.  4.  Hypertension: Blood pressure controlled 1.  May be restarting amlodipine.  Currently hold off the statin therapy.  5.  Hyperlipidemia: Hold off the statin therapy.  Pending HMG Co. antibody.  6.  Smoking cessation: Smoking cessation counseling was performed.  The patient was counseled on the harms of smoking, including increased risk for lung disease, cardiovascular disease and cancer. In  the context of the disease, smoking is associated with a greater pain, worse joint damage, and worse response to biological therapy.  About 11 to 15 minute on smoking cessation and counseling to patient and family.  7.  Prophylaxis:  Currently on Lovenox 40 mg subcu daily.    Pt. care time is spent includes review of diagnostic tests, labs, radiographs, EKGs, old echoes, cardiac work-up and coordination of care. Assessment, impression and plans are reflected in the note above as well as the orders.    Code Status:  Full Code  I spent 50 minutes in the professional and overall care of this patient.  Khris Fuentes MD

## 2024-09-15 VITALS
SYSTOLIC BLOOD PRESSURE: 148 MMHG | OXYGEN SATURATION: 99 % | BODY MASS INDEX: 24.1 KG/M2 | TEMPERATURE: 99.7 F | WEIGHT: 177.91 LBS | HEIGHT: 72 IN | HEART RATE: 71 BPM | DIASTOLIC BLOOD PRESSURE: 81 MMHG | RESPIRATION RATE: 21 BRPM

## 2024-09-15 LAB
ALBUMIN SERPL-MCNC: 3 G/DL (ref 3.5–5)
ALP BLD-CCNC: 82 U/L (ref 35–125)
ALT SERPL-CCNC: 111 U/L (ref 5–40)
ANION GAP SERPL CALC-SCNC: 10 MMOL/L
AST SERPL-CCNC: 112 U/L (ref 5–40)
BACTERIA BLD CULT: NORMAL
BACTERIA BLD CULT: NORMAL
BASOPHILS # BLD AUTO: 0.07 X10*3/UL (ref 0–0.1)
BASOPHILS NFR BLD AUTO: 0.8 %
BILIRUB SERPL-MCNC: 0.4 MG/DL (ref 0.1–1.2)
BUN SERPL-MCNC: 4 MG/DL (ref 8–25)
C DIF TOX TCDA+TCDB STL QL NAA+PROBE: NOT DETECTED
CALCIUM SERPL-MCNC: 8.1 MG/DL (ref 8.5–10.4)
CHLORIDE SERPL-SCNC: 109 MMOL/L (ref 97–107)
CK SERPL-CCNC: 3365 U/L (ref 24–195)
CO2 SERPL-SCNC: 25 MMOL/L (ref 24–31)
CREAT SERPL-MCNC: 0.7 MG/DL (ref 0.4–1.6)
EGFRCR SERPLBLD CKD-EPI 2021: >90 ML/MIN/1.73M*2
EOSINOPHIL # BLD AUTO: 0.25 X10*3/UL (ref 0–0.7)
EOSINOPHIL NFR BLD AUTO: 3 %
ERYTHROCYTE [DISTWIDTH] IN BLOOD BY AUTOMATED COUNT: 15.4 % (ref 11.5–14.5)
GLUCOSE SERPL-MCNC: 82 MG/DL (ref 65–99)
HCT VFR BLD AUTO: 35.1 % (ref 41–52)
HGB BLD-MCNC: 11.7 G/DL (ref 13.5–17.5)
IMM GRANULOCYTES # BLD AUTO: 0.03 X10*3/UL (ref 0–0.7)
IMM GRANULOCYTES NFR BLD AUTO: 0.4 % (ref 0–0.9)
LYMPHOCYTES # BLD AUTO: 2.11 X10*3/UL (ref 1.2–4.8)
LYMPHOCYTES NFR BLD AUTO: 25.4 %
MAGNESIUM SERPL-MCNC: 1.7 MG/DL (ref 1.6–3.1)
MCH RBC QN AUTO: 30.2 PG (ref 26–34)
MCHC RBC AUTO-ENTMCNC: 33.3 G/DL (ref 32–36)
MCV RBC AUTO: 91 FL (ref 80–100)
MONOCYTES # BLD AUTO: 0.64 X10*3/UL (ref 0.1–1)
MONOCYTES NFR BLD AUTO: 7.7 %
NEUTROPHILS # BLD AUTO: 5.22 X10*3/UL (ref 1.2–7.7)
NEUTROPHILS NFR BLD AUTO: 62.7 %
NRBC BLD-RTO: 0 /100 WBCS (ref 0–0)
PHOSPHATE SERPL-MCNC: 2.6 MG/DL (ref 2.5–4.5)
PLATELET # BLD AUTO: 271 X10*3/UL (ref 150–450)
POTASSIUM SERPL-SCNC: 3.7 MMOL/L (ref 3.4–5.1)
PROT SERPL-MCNC: 5.7 G/DL (ref 5.9–7.9)
RBC # BLD AUTO: 3.87 X10*6/UL (ref 4.5–5.9)
RENIN PLAS-CCNC: 0.4 NG/ML/HR
SODIUM SERPL-SCNC: 144 MMOL/L (ref 133–145)
WBC # BLD AUTO: 8.3 X10*3/UL (ref 4.4–11.3)

## 2024-09-15 PROCEDURE — 85025 COMPLETE CBC W/AUTO DIFF WBC: CPT | Performed by: STUDENT IN AN ORGANIZED HEALTH CARE EDUCATION/TRAINING PROGRAM

## 2024-09-15 PROCEDURE — 2500000004 HC RX 250 GENERAL PHARMACY W/ HCPCS (ALT 636 FOR OP/ED): Performed by: INTERNAL MEDICINE

## 2024-09-15 PROCEDURE — 1200000002 HC GENERAL ROOM WITH TELEMETRY DAILY

## 2024-09-15 PROCEDURE — 2500000001 HC RX 250 WO HCPCS SELF ADMINISTERED DRUGS (ALT 637 FOR MEDICARE OP): Performed by: INTERNAL MEDICINE

## 2024-09-15 PROCEDURE — 2500000001 HC RX 250 WO HCPCS SELF ADMINISTERED DRUGS (ALT 637 FOR MEDICARE OP): Performed by: STUDENT IN AN ORGANIZED HEALTH CARE EDUCATION/TRAINING PROGRAM

## 2024-09-15 PROCEDURE — 99232 SBSQ HOSP IP/OBS MODERATE 35: CPT | Performed by: STUDENT IN AN ORGANIZED HEALTH CARE EDUCATION/TRAINING PROGRAM

## 2024-09-15 PROCEDURE — 82550 ASSAY OF CK (CPK): CPT | Performed by: INTERNAL MEDICINE

## 2024-09-15 PROCEDURE — 83735 ASSAY OF MAGNESIUM: CPT | Performed by: STUDENT IN AN ORGANIZED HEALTH CARE EDUCATION/TRAINING PROGRAM

## 2024-09-15 PROCEDURE — 2500000002 HC RX 250 W HCPCS SELF ADMINISTERED DRUGS (ALT 637 FOR MEDICARE OP, ALT 636 FOR OP/ED): Performed by: STUDENT IN AN ORGANIZED HEALTH CARE EDUCATION/TRAINING PROGRAM

## 2024-09-15 PROCEDURE — 2500000004 HC RX 250 GENERAL PHARMACY W/ HCPCS (ALT 636 FOR OP/ED): Performed by: STUDENT IN AN ORGANIZED HEALTH CARE EDUCATION/TRAINING PROGRAM

## 2024-09-15 PROCEDURE — S4991 NICOTINE PATCH NONLEGEND: HCPCS | Performed by: STUDENT IN AN ORGANIZED HEALTH CARE EDUCATION/TRAINING PROGRAM

## 2024-09-15 PROCEDURE — 80053 COMPREHEN METABOLIC PANEL: CPT | Performed by: STUDENT IN AN ORGANIZED HEALTH CARE EDUCATION/TRAINING PROGRAM

## 2024-09-15 PROCEDURE — 84100 ASSAY OF PHOSPHORUS: CPT | Performed by: INTERNAL MEDICINE

## 2024-09-15 ASSESSMENT — PAIN DESCRIPTION - LOCATION
LOCATION: BACK

## 2024-09-15 ASSESSMENT — PAIN SCALES - GENERAL
PAINLEVEL_OUTOF10: 8
PAINLEVEL_OUTOF10: 4
PAINLEVEL_OUTOF10: 8
PAINLEVEL_OUTOF10: 7
PAINLEVEL_OUTOF10: 10 - WORST POSSIBLE PAIN
PAINLEVEL_OUTOF10: 8

## 2024-09-15 ASSESSMENT — PAIN DESCRIPTION - ORIENTATION
ORIENTATION: LOWER

## 2024-09-15 ASSESSMENT — COGNITIVE AND FUNCTIONAL STATUS - GENERAL
MOBILITY SCORE: 24
DAILY ACTIVITIY SCORE: 24

## 2024-09-15 ASSESSMENT — PAIN DESCRIPTION - DESCRIPTORS
DESCRIPTORS: ACHING

## 2024-09-15 ASSESSMENT — PAIN - FUNCTIONAL ASSESSMENT
PAIN_FUNCTIONAL_ASSESSMENT: 0-10

## 2024-09-15 NOTE — NURSING NOTE
Agree with previous assessment. Patient lying comfortable in bed. Not in any acute distress. Denies any needs. Call light and possessions within reach.

## 2024-09-15 NOTE — PROGRESS NOTES
Bereket Schaefer is a 48 y.o. male on day 5 of admission presenting with Hypokalemia.      Subjective     He continues to have diarrhea.  Other than that he has no other complaints.  Feels that he is back to normal with his chronic back pain but no other acute event.  Potassium is at 3.7 today.  He did require an additional dose of IV potassium yesterday.    .       Objective     Last Recorded Vitals  /78 (BP Location: Left arm, Patient Position: Sitting)   Pulse 64   Temp 36.6 °C (97.9 °F) (Temporal)   Resp 16   Wt 80.7 kg (177 lb 14.6 oz)   SpO2 96%   Intake/Output last 3 Shifts:    Intake/Output Summary (Last 24 hours) at 9/15/2024 1013  Last data filed at 9/15/2024 0729  Gross per 24 hour   Intake 2664.58 ml   Output 600 ml   Net 2064.58 ml       Admission Weight  Weight: 73.5 kg (162 lb 0.6 oz) (09/10/24 1152)    Daily Weight  09/13/24 : 80.7 kg (177 lb 14.6 oz)    Image Results  NM hepatobiliary w cholecystokinin  Narrative: Interpreted By:  Augustine Li,   STUDY:  NM HEPATOBILIARY W CHOLECYSTOKININ;  9/12/2024 10:09 pm      INDICATION:  Signs/Symptoms:Rule out acute cholecystitis.      COMPARISON:  None.      ACCESSION NUMBER(S):  [id number]      ORDERING CLINICIAN:  VIKTORIA VANEGAS      TECHNIQUE:  DIVISION OF NUCLEAR MEDICINE  HEPATOBILIARY SCAN (HIDA), QUANTITATIVE      The patient received an intravenous dose of 5.1 mCi of Tc-99m  mebrofenin (Choletec).  Sequential images of the upper abdomen were  then acquired over the next 60 minutes.  An intravenous infusion of  the cholecystokinin (CCK) analogue, Sincalidewas then administered  followed by an additional period of imaging.  Computer quantification  of gallbladder emptying was then performed.      FINDINGS:  There is prompt radiotracer uptake in the liver and there is normal  visualization of the gallbladder without scintigraphic evidence of  acute cholecystitis. Ejection fraction was also calculated with an EF  of 93%.      Impression: No  scintigraphic evidence of acute cholecystitis.      MACRO:  None      Signed by: Augustine Li 9/12/2024 11:58 PM  Dictation workstation:   [id number]  US right upper quadrant  Narrative: Interpreted By:  Wang Mariee,   STUDY:  US RIGHT UPPER QUADRANT; 9/12/2024 3:24 pm      INDICATION:  Signs/Symptoms:elevated LFTs.      COMPARISON:  None.      ACCESSION NUMBER(S):  [id number]      ORDERING CLINICIAN:  TY BRIGGS      TECHNIQUE:  Grayscale and color flow images were obtained of the right upper  quadrant.      FINDINGS:  The head and body of the pancreas are normal in appearance.  The  pancreatic tail is  obscured by gas. There is a 5 mm simple cyst dome  of the left lobe of the liver. Hepatic parenchyma is otherwise  unremarkable in appearance.  No gallstones are identified.  Bladder  wall is markedly thickened measuring up to 12 mm thick with edema  like echogenicity within the wall. Patient was also tender upon  palpation of the gallbladder with the ultrasound transducer. The  common bile duct measures 2.2 mm. Visualized portions of the right  kidney are unremarkable.      Impression: 1. There is marked gallbladder wall thickening with what appears to  be pericholecystic fluid and tenderness upon palpation of the  gallbladder with the ultrasound transducer. The gallbladder wall  thickening is a new finding compared to the CT from 09/10/2024 and  raises concern for acute cholecystitis although no gallstones were  identified.  2. There is no dilatation of the common duct.  3. Incidental note is made of a cyst within the left lobe of the  liver near the dome.      MACRO:  Wang Mariee communicated finding via epic secure chat with  Juan Luis Lorenzo MD on 9/12/2024 at 3:48 pm.  (**-RCF-**)  Findings:  See findings.      Signed by: Wang Mariee 9/12/2024 3:52 PM  Dictation workstation:   [id number]      Physical Exam  Constitutional:       Appearance: Normal appearance.   HENT:      Head:  Normocephalic and atraumatic.      Nose: Nose normal.   Eyes:      Extraocular Movements: Extraocular movements intact.      Pupils: Pupils are equal, round, and reactive to light.   Cardiovascular:      Rate and Rhythm: Normal rate and regular rhythm.      Heart sounds: Normal heart sounds.   Pulmonary:      Effort: Pulmonary effort is normal.      Breath sounds: Normal breath sounds.   Abdominal:      General: Bowel sounds are normal.      Palpations: Abdomen is soft.   Musculoskeletal:      Cervical back: Neck supple.   Skin:     General: Skin is warm and dry.   Neurological:      General: No focal deficit present.      Mental Status: He is alert. Mental status is at baseline.   Psychiatric:         Mood and Affect: Mood normal.         Relevant Results               Assessment/Plan        This patient has a central line   Reason for the central line remaining today? Parenteral nutrition            Assessment & Plan  Hypokalemia      Severe hypokalemia.  Potassium is at 3.7 today.  He did have an additional potassium bolus yesterday.  Will give him Imodium to slow down his diarrhea and recheck his potassium tomorrow.    Acute rhabdomyolysis.  Continue IV fluids.  CK is coming down.  Nephrology on board.    Chronic back pain.  Nothing acute and nothing unusual.    Hypocalcemia replaced.    Hypomagnesemia  replaced    Elevated liver function test.  Liver function test is better today.  Recheck LFTs tomorrow.            Joi Randhawa MD

## 2024-09-15 NOTE — CARE PLAN
Problem: Pain  Goal: Takes deep breaths with improved pain control throughout the shift  Outcome: Progressing  Goal: Turns in bed with improved pain control throughout the shift  Outcome: Progressing  Goal: Walks with improved pain control throughout the shift  Outcome: Progressing  Goal: Performs ADL's with improved pain control throughout shift  Outcome: Progressing  Goal: Participates in PT with improved pain control throughout the shift  Outcome: Progressing   The patient's goals for the shift include get my potassium up    The clinical goals for the shift include Pain Management

## 2024-09-15 NOTE — CARE PLAN
The patient's goals for the shift include get my potassium up    The clinical goals for the shift include pain management, monitor labs and vitals, iv fluids    Problem: Pain  Goal: Takes deep breaths with improved pain control throughout the shift  Outcome: Progressing  Goal: Turns in bed with improved pain control throughout the shift  Outcome: Progressing  Goal: Walks with improved pain control throughout the shift  Outcome: Progressing  Goal: Performs ADL's with improved pain control throughout shift  Outcome: Progressing  Goal: Participates in PT with improved pain control throughout the shift  Outcome: Progressing  Goal: Free from opioid side effects throughout the shift  Outcome: Progressing  Goal: Free from acute confusion related to pain meds throughout the shift  Outcome: Progressing     Problem: Discharge Planning  Goal: Discharge to home or other facility with appropriate resources  Outcome: Progressing     Problem: Chronic Conditions and Co-morbidities  Goal: Patient's chronic conditions and co-morbidity symptoms are monitored and maintained or improved  Outcome: Progressing     Problem: Skin  Goal: Decreased wound size/increased tissue granulation at next dressing change  Outcome: Progressing  Goal: Participates in plan/prevention/treatment measures  Outcome: Progressing  Goal: Prevent/manage excess moisture  Outcome: Progressing  Goal: Prevent/minimize sheer/friction injuries  Outcome: Progressing  Goal: Promote/optimize nutrition  Outcome: Progressing  Goal: Promote skin healing  Outcome: Progressing     Problem: Fall/Injury  Goal: Not fall by end of shift  Outcome: Progressing  Goal: Be free from injury by end of the shift  Outcome: Progressing  Goal: Verbalize understanding of personal risk factors for fall in the hospital  Outcome: Progressing  Goal: Verbalize understanding of risk factor reduction measures to prevent injury from fall in the home  Outcome: Progressing  Goal: Use assistive devices  by end of the shift  Outcome: Progressing  Goal: Pace activities to prevent fatigue by end of the shift  Outcome: Progressing

## 2024-09-15 NOTE — NURSING NOTE
Assumed care of patient. patient is resting comfortably in the bed. Call bell and possessions within reach.

## 2024-09-16 VITALS
TEMPERATURE: 97.7 F | BODY MASS INDEX: 24.1 KG/M2 | OXYGEN SATURATION: 98 % | DIASTOLIC BLOOD PRESSURE: 75 MMHG | WEIGHT: 177.91 LBS | RESPIRATION RATE: 19 BRPM | HEART RATE: 68 BPM | SYSTOLIC BLOOD PRESSURE: 136 MMHG | HEIGHT: 72 IN

## 2024-09-16 LAB
ALBUMIN SERPL-MCNC: 3.1 G/DL (ref 3.5–5)
ALP BLD-CCNC: 77 U/L (ref 35–125)
ALT SERPL-CCNC: 85 U/L (ref 5–40)
ANION GAP SERPL CALC-SCNC: 9 MMOL/L
AST SERPL-CCNC: 52 U/L (ref 5–40)
BASOPHILS # BLD AUTO: 0.07 X10*3/UL (ref 0–0.1)
BASOPHILS NFR BLD AUTO: 1 %
BILIRUB SERPL-MCNC: 0.3 MG/DL (ref 0.1–1.2)
BUN SERPL-MCNC: 5 MG/DL (ref 8–25)
C COLI+JEJ+UPSA DNA STL QL NAA+PROBE: NOT DETECTED
CALCIUM SERPL-MCNC: 7.7 MG/DL (ref 8.5–10.4)
CHLORIDE SERPL-SCNC: 107 MMOL/L (ref 97–107)
CK SERPL-CCNC: 1233 U/L (ref 24–195)
CO2 SERPL-SCNC: 26 MMOL/L (ref 24–31)
CREAT SERPL-MCNC: 0.7 MG/DL (ref 0.4–1.6)
EC STX1 GENE STL QL NAA+PROBE: NOT DETECTED
EC STX2 GENE STL QL NAA+PROBE: NOT DETECTED
EGFRCR SERPLBLD CKD-EPI 2021: >90 ML/MIN/1.73M*2
EOSINOPHIL # BLD AUTO: 0.29 X10*3/UL (ref 0–0.7)
EOSINOPHIL NFR BLD AUTO: 4.2 %
ERYTHROCYTE [DISTWIDTH] IN BLOOD BY AUTOMATED COUNT: 15.1 % (ref 11.5–14.5)
GLUCOSE SERPL-MCNC: 103 MG/DL (ref 65–99)
HCT VFR BLD AUTO: 33.8 % (ref 41–52)
HGB BLD-MCNC: 11.2 G/DL (ref 13.5–17.5)
IMM GRANULOCYTES # BLD AUTO: 0.02 X10*3/UL (ref 0–0.7)
IMM GRANULOCYTES NFR BLD AUTO: 0.3 % (ref 0–0.9)
LYMPHOCYTES # BLD AUTO: 2.12 X10*3/UL (ref 1.2–4.8)
LYMPHOCYTES NFR BLD AUTO: 30.4 %
MAGNESIUM SERPL-MCNC: 1.6 MG/DL (ref 1.6–3.1)
MCH RBC QN AUTO: 30.4 PG (ref 26–34)
MCHC RBC AUTO-ENTMCNC: 33.1 G/DL (ref 32–36)
MCV RBC AUTO: 92 FL (ref 80–100)
MONOCYTES # BLD AUTO: 0.54 X10*3/UL (ref 0.1–1)
MONOCYTES NFR BLD AUTO: 7.7 %
NEUTROPHILS # BLD AUTO: 3.94 X10*3/UL (ref 1.2–7.7)
NEUTROPHILS NFR BLD AUTO: 56.4 %
NOROVIRUS GI + GII RNA STL NAA+PROBE: NOT DETECTED
NRBC BLD-RTO: 0 /100 WBCS (ref 0–0)
PHOSPHATE SERPL-MCNC: 2.6 MG/DL (ref 2.5–4.5)
PLATELET # BLD AUTO: 172 X10*3/UL (ref 150–450)
POTASSIUM SERPL-SCNC: 3.6 MMOL/L (ref 3.4–5.1)
PROT SERPL-MCNC: 5.6 G/DL (ref 5.9–7.9)
RBC # BLD AUTO: 3.69 X10*6/UL (ref 4.5–5.9)
RV RNA STL NAA+PROBE: NOT DETECTED
SALMONELLA DNA STL QL NAA+PROBE: NOT DETECTED
SHIGELLA DNA SPEC QL NAA+PROBE: NOT DETECTED
SODIUM SERPL-SCNC: 142 MMOL/L (ref 133–145)
V CHOLERAE DNA STL QL NAA+PROBE: NOT DETECTED
WBC # BLD AUTO: 7 X10*3/UL (ref 4.4–11.3)
Y ENTEROCOL DNA STL QL NAA+PROBE: NOT DETECTED

## 2024-09-16 PROCEDURE — 2500000004 HC RX 250 GENERAL PHARMACY W/ HCPCS (ALT 636 FOR OP/ED): Performed by: INTERNAL MEDICINE

## 2024-09-16 PROCEDURE — 82550 ASSAY OF CK (CPK): CPT | Performed by: INTERNAL MEDICINE

## 2024-09-16 PROCEDURE — 2500000004 HC RX 250 GENERAL PHARMACY W/ HCPCS (ALT 636 FOR OP/ED): Mod: JZ | Performed by: STUDENT IN AN ORGANIZED HEALTH CARE EDUCATION/TRAINING PROGRAM

## 2024-09-16 PROCEDURE — 85025 COMPLETE CBC W/AUTO DIFF WBC: CPT | Performed by: STUDENT IN AN ORGANIZED HEALTH CARE EDUCATION/TRAINING PROGRAM

## 2024-09-16 PROCEDURE — 2500000002 HC RX 250 W HCPCS SELF ADMINISTERED DRUGS (ALT 637 FOR MEDICARE OP, ALT 636 FOR OP/ED): Performed by: STUDENT IN AN ORGANIZED HEALTH CARE EDUCATION/TRAINING PROGRAM

## 2024-09-16 PROCEDURE — 2500000001 HC RX 250 WO HCPCS SELF ADMINISTERED DRUGS (ALT 637 FOR MEDICARE OP): Performed by: INTERNAL MEDICINE

## 2024-09-16 PROCEDURE — S4991 NICOTINE PATCH NONLEGEND: HCPCS | Performed by: STUDENT IN AN ORGANIZED HEALTH CARE EDUCATION/TRAINING PROGRAM

## 2024-09-16 PROCEDURE — 84244 ASSAY OF RENIN: CPT | Performed by: INTERNAL MEDICINE

## 2024-09-16 PROCEDURE — 2500000004 HC RX 250 GENERAL PHARMACY W/ HCPCS (ALT 636 FOR OP/ED): Performed by: STUDENT IN AN ORGANIZED HEALTH CARE EDUCATION/TRAINING PROGRAM

## 2024-09-16 PROCEDURE — 2500000001 HC RX 250 WO HCPCS SELF ADMINISTERED DRUGS (ALT 637 FOR MEDICARE OP): Performed by: STUDENT IN AN ORGANIZED HEALTH CARE EDUCATION/TRAINING PROGRAM

## 2024-09-16 PROCEDURE — 83735 ASSAY OF MAGNESIUM: CPT | Performed by: STUDENT IN AN ORGANIZED HEALTH CARE EDUCATION/TRAINING PROGRAM

## 2024-09-16 PROCEDURE — 82088 ASSAY OF ALDOSTERONE: CPT | Performed by: INTERNAL MEDICINE

## 2024-09-16 PROCEDURE — 84100 ASSAY OF PHOSPHORUS: CPT | Performed by: INTERNAL MEDICINE

## 2024-09-16 PROCEDURE — 84075 ASSAY ALKALINE PHOSPHATASE: CPT | Performed by: STUDENT IN AN ORGANIZED HEALTH CARE EDUCATION/TRAINING PROGRAM

## 2024-09-16 RX ORDER — CALCIUM GLUCONATE 20 MG/ML
1 INJECTION, SOLUTION INTRAVENOUS ONCE
Status: COMPLETED | OUTPATIENT
Start: 2024-09-16 | End: 2024-09-16

## 2024-09-16 RX ORDER — HYDROCODONE BITARTRATE AND ACETAMINOPHEN 10; 325 MG/1; MG/1
1 TABLET ORAL EVERY 4 HOURS PRN
Start: 2024-09-16

## 2024-09-16 RX ORDER — POTASSIUM CHLORIDE 1.5 G/1.58G
40 POWDER, FOR SOLUTION ORAL
Qty: 120 PACKET | Refills: 1 | Status: SHIPPED | OUTPATIENT
Start: 2024-09-16 | End: 2024-11-15

## 2024-09-16 ASSESSMENT — PAIN - FUNCTIONAL ASSESSMENT
PAIN_FUNCTIONAL_ASSESSMENT: UNABLE TO SELF-REPORT
PAIN_FUNCTIONAL_ASSESSMENT: 0-10

## 2024-09-16 ASSESSMENT — PAIN SCALES - GENERAL
PAINLEVEL_OUTOF10: 8
PAINLEVEL_OUTOF10: 9
PAINLEVEL_OUTOF10: 8
PAINLEVEL_OUTOF10: 10 - WORST POSSIBLE PAIN
PAINLEVEL_OUTOF10: 6
PAINLEVEL_OUTOF10: 8
PAINLEVEL_OUTOF10: 8
PAINLEVEL_OUTOF10: 7
PAINLEVEL_OUTOF10: 7

## 2024-09-16 ASSESSMENT — PAIN DESCRIPTION - LOCATION
LOCATION: BACK

## 2024-09-16 ASSESSMENT — PAIN DESCRIPTION - ORIENTATION
ORIENTATION: LOWER
ORIENTATION: LOWER

## 2024-09-16 NOTE — CARE PLAN
Problem: Pain  Goal: Takes deep breaths with improved pain control throughout the shift  Outcome: Progressing  Goal: Turns in bed with improved pain control throughout the shift  Outcome: Progressing  Goal: Walks with improved pain control throughout the shift  Outcome: Progressing  Goal: Performs ADL's with improved pain control throughout shift  Outcome: Progressing  Goal: Participates in PT with improved pain control throughout the shift  Outcome: Progressing  Goal: Free from opioid side effects throughout the shift  Outcome: Progressing  Goal: Free from acute confusion related to pain meds throughout the shift  Outcome: Progressing     Problem: Discharge Planning  Goal: Discharge to home or other facility with appropriate resources  Outcome: Progressing     Problem: Chronic Conditions and Co-morbidities  Goal: Patient's chronic conditions and co-morbidity symptoms are monitored and maintained or improved  Outcome: Progressing     Problem: Skin  Goal: Decreased wound size/increased tissue granulation at next dressing change  Outcome: Progressing  Goal: Participates in plan/prevention/treatment measures  Outcome: Progressing  Goal: Prevent/manage excess moisture  Outcome: Progressing  Goal: Prevent/minimize sheer/friction injuries  Outcome: Progressing  Goal: Promote/optimize nutrition  Outcome: Progressing  Goal: Promote skin healing  Outcome: Progressing     Problem: Fall/Injury  Goal: Not fall by end of shift  Outcome: Progressing  Goal: Be free from injury by end of the shift  Outcome: Progressing  Goal: Verbalize understanding of personal risk factors for fall in the hospital  Outcome: Progressing  Goal: Verbalize understanding of risk factor reduction measures to prevent injury from fall in the home  Outcome: Progressing  Goal: Use assistive devices by end of the shift  Outcome: Progressing  Goal: Pace activities to prevent fatigue by end of the shift  Outcome: Progressing

## 2024-09-16 NOTE — CARE PLAN
Problem: Pain  Goal: Takes deep breaths with improved pain control throughout the shift  9/16/2024 1456 by Trenton Malave RN  Outcome: Adequate for Discharge  9/16/2024 0736 by Trenton Malave RN  Outcome: Progressing  Goal: Turns in bed with improved pain control throughout the shift  9/16/2024 1456 by Trenton Malave RN  Outcome: Adequate for Discharge  9/16/2024 0736 by Trenton Malave RN  Outcome: Progressing  Goal: Walks with improved pain control throughout the shift  9/16/2024 1456 by Trenton Malave RN  Outcome: Adequate for Discharge  9/16/2024 0736 by Trenton Malave RN  Outcome: Progressing  Goal: Performs ADL's with improved pain control throughout shift  9/16/2024 1456 by Trenton Malave RN  Outcome: Adequate for Discharge  9/16/2024 0736 by Trenton Malave RN  Outcome: Progressing  Goal: Participates in PT with improved pain control throughout the shift  9/16/2024 1456 by Trenton Malave RN  Outcome: Adequate for Discharge  9/16/2024 0736 by Trenton Malave RN  Outcome: Progressing  Goal: Free from opioid side effects throughout the shift  9/16/2024 1456 by Trenton Malave RN  Outcome: Adequate for Discharge  9/16/2024 0736 by Trenton Malave RN  Outcome: Progressing  Goal: Free from acute confusion related to pain meds throughout the shift  9/16/2024 1456 by Trenton Malave RN  Outcome: Adequate for Discharge  9/16/2024 0736 by Trenton Malave RN  Outcome: Progressing     Problem: Discharge Planning  Goal: Discharge to home or other facility with appropriate resources  9/16/2024 1456 by Trenton Malave RN  Outcome: Adequate for Discharge  9/16/2024 0736 by Trenton Malave RN  Outcome: Progressing     Problem: Chronic Conditions and Co-morbidities  Goal: Patient's chronic conditions and co-morbidity symptoms are monitored and maintained or improved  9/16/2024 1456 by Trenton Malave RN  Outcome: Adequate for Discharge  9/16/2024 0736 by Trenton Malave RN  Outcome: Progressing     Problem: Skin  Goal: Decreased wound size/increased tissue granulation at next dressing  change  9/16/2024 1456 by Trenton Malave RN  Outcome: Adequate for Discharge  9/16/2024 0736 by Trenton Malave RN  Outcome: Progressing  Goal: Participates in plan/prevention/treatment measures  9/16/2024 1456 by Trenton Malave, RN  Outcome: Adequate for Discharge  9/16/2024 0736 by Trenton Malave RN  Outcome: Progressing  Goal: Prevent/manage excess moisture  9/16/2024 1456 by Trenton Malave, RN  Outcome: Adequate for Discharge  9/16/2024 0736 by Trenton Malave RN  Outcome: Progressing  Goal: Prevent/minimize sheer/friction injuries  9/16/2024 1456 by Trenton Malave, RN  Outcome: Adequate for Discharge  9/16/2024 0736 by Trenton Malave, RN  Outcome: Progressing  Goal: Promote/optimize nutrition  9/16/2024 1456 by Trenton Malave, RN  Outcome: Adequate for Discharge  9/16/2024 0736 by Trenton Malave, RN  Outcome: Progressing  Goal: Promote skin healing  9/16/2024 1456 by Trenton Malave, RN  Outcome: Adequate for Discharge  9/16/2024 0736 by Trenton Malave RN  Outcome: Progressing     Problem: Fall/Injury  Goal: Not fall by end of shift  9/16/2024 1456 by Trenton Malave, RN  Outcome: Adequate for Discharge  9/16/2024 0736 by Trenton Malave, RN  Outcome: Progressing  Goal: Be free from injury by end of the shift  9/16/2024 1456 by Trenton Malave, RN  Outcome: Adequate for Discharge  9/16/2024 0736 by Trenton Malave, RN  Outcome: Progressing  Goal: Verbalize understanding of personal risk factors for fall in the hospital  9/16/2024 1456 by Trenton Malave, RN  Outcome: Adequate for Discharge  9/16/2024 0736 by Trenton Malave RN  Outcome: Progressing  Goal: Verbalize understanding of risk factor reduction measures to prevent injury from fall in the home  9/16/2024 1456 by Trenton Malave, RN  Outcome: Adequate for Discharge  9/16/2024 0736 by Trenton Malave, RN  Outcome: Progressing  Goal: Use assistive devices by end of the shift  9/16/2024 1456 by Trenton Malave, RN  Outcome: Adequate for Discharge  9/16/2024 0736 by Trenton Malave RN  Outcome: Progressing  Goal: Pace activities to prevent fatigue by  end of the shift  9/16/2024 1456 by Trenton Malave RN  Outcome: Adequate for Discharge  9/16/2024 0736 by Trenton Malave RN  Outcome: Progressing

## 2024-09-16 NOTE — CARE PLAN
The patient's goals for the shift include rest    The clinical goals for the shift include pain management, monitor labs and vitals, iv fluids, and promote rest      09/15/24 at 8:27 PM - Crystal Ramirez RN

## 2024-09-16 NOTE — DISCHARGE SUMMARY
Discharge Diagnosis  Rhabdomyolysis  Severe profound hypokalemia  Chronic back pain  Elevated liver enzymes    Issues Requiring Follow-Up  Outpatient lab testing to be done in 3 days  Outpatient follow-up with nephrology    Discharge Meds     Medication List      START taking these medications     potassium chloride 20 mEq packet; Commonly known as: Klor-Con; Take 40   mEq by mouth 2 times a day after meals.     CONTINUE taking these medications     amLODIPine 10 mg tablet; Commonly known as: Norvasc; TAKE ONE TABLET BY   MOUTH ONCE DAILY   baclofen 10 mg tablet; Commonly known as: Lioresal; Take 1 tablet (10   mg) by mouth 3 times a day as needed for muscle spasms (quadriceps pain).   escitalopram 10 mg tablet; Commonly known as: Lexapro; Take 1 tablet (10   mg) by mouth once daily.   * HYDROcodone-acetaminophen  mg tablet; Commonly known as: Norco;   Take 1 tablet by mouth every 4 hours if needed for severe pain (7 - 10).   Do not fill before September 13, 2024.   * HYDROcodone-acetaminophen  mg tablet; Commonly known as: Norco;   Take 1 tablet by mouth every 4 hours if needed for severe pain (7 - 10).   * HYDROcodone-acetaminophen  mg tablet; Commonly known as: Norco;   Take 1 tablet by mouth every 4 hours if needed for severe pain (7 - 10).   Do not fill before October 13, 2024.; Start taking on: October 13, 2024   lisinopril 10 mg tablet; Take 1 tablet (10 mg) by mouth once daily. as   directed   naloxone 4 mg/0.1 mL nasal spray; Commonly known as: Narcan; Administer   1 spray (4 mg) into affected nostril(s) if needed for opioid reversal. May   repeat every 2-3 minutes if needed, alternating nostrils, until medical   assistance becomes available.   naproxen 500 mg tablet; Commonly known as: Naprosyn; Take 1 tablet (500   mg) by mouth 2 times daily (morning and late afternoon).   * nicotine 14 mg/24 hr patch; Commonly known as: Nicoderm CQ   * nicotine 21 mg/24 hr patch; Commonly known as:  Nicoderm CQ   tadalafil 5 mg tablet; Commonly known as: Cialis; Take 1 tablet (5 mg)   by mouth once daily.  * This list has 5 medication(s) that are the same as other medications   prescribed for you. Read the directions carefully, and ask your doctor or   other care provider to review them with you.     STOP taking these medications     atorvastatin 20 mg tablet; Commonly known as: Lipitor   ibuprofen 600 mg tablet       Test Results Pending At Discharge  Pending Labs       Order Current Status    Extra Urine Gray Tube Collected (09/10/24 1226)    Aldosterone, Serum In process    HMGCR AB, IgG In process    Renin Activity In process    Urinalysis with Reflex Culture and Microscopic In process            Hospital Course   48-year-old male patient presented to the hospital emergency department with profound weakness.  And aches in the arms and legs he is a .  In the ED, he had a potassium level less than 1.0.  He had a CK level of 11,111  Was admitted and started on aggressive potassium replacement.  He was hydrated intravenously.  Atorvastatin was discontinued.  Aldosterone and renin levels were checked and were within normal limits.  His potassium level has normalized with potassium supplementation..  CK has significantly improved and was 1,233.  Had elevated liver enzymes which are also improving in the hospital.  He clinically improved and was medically stable for discharge.  He is to remain off atorvastatin.  He was discharged on potassium chloride 40 mEq twice daily.    He is to have repeat lab testing done in 3 days.  He will have a CMP to recheck his potassium, kidney function and liver enzymes.  He will also have a CK level.  He is to follow-up with nephrologist, Dr. Ward Garcia in the office.  He is also asked to follow-up with his primary physician the day after his lab tests are done all within a week.  The time spent arranging and completing discharge was 35 minutes.    Outpatient  Follow-Up  Future Appointments   Date Time Provider Department Center   11/13/2024  3:20 PM Gurpreet Cruz MD [id number] [facility]   2/12/2025  2:00 PM Gurpreet Cruz MD [id number] [facility]   5/14/2025  2:20 PM Gurpreet Cruz MD [id number] [facility]   8/13/2025 11:20 AM Gurpreet Cruz MD [id number] [facility]         Mel Mendoza MD

## 2024-09-16 NOTE — PROGRESS NOTES
Bereket Schaefer is a 48 y.o. male on day 6 of admission presenting with Hypokalemia.      Subjective   Patient with no complaints, doing well, his potassium remains normal on supplementation and his CK is much improved down to about 1200.       Objective          Vitals 24HR  Heart Rate:  [58-71]   Temp:  [36.5 °C (97.7 °F)-37.6 °C (99.7 °F)]   Resp:  [17-21]   BP: (131-151)/(61-82)   SpO2:  [98 %-100 %]       Intake/Output last 3 Shifts:    Intake/Output Summary (Last 24 hours) at 9/16/2024 1333  Last data filed at 9/16/2024 1210  Gross per 24 hour   Intake 6189.92 ml   Output 650 ml   Net 5539.92 ml       Physical Exam  Constitutional:       General: He is awake. He is not in acute distress.  Cardiovascular:      Rate and Rhythm: Regular rhythm.      Heart sounds:      No friction rub.   Pulmonary:      Effort: Pulmonary effort is normal.      Breath sounds: Normal breath sounds.   Abdominal:      General: Bowel sounds are normal.      Palpations: Abdomen is soft.      Tenderness: There is no guarding or rebound.   Musculoskeletal:      Right lower leg: No edema.      Left lower leg: No edema.   Neurological:      Mental Status: He is alert.         Relevant Results  Results for orders placed or performed during the hospital encounter of 09/10/24 (from the past 24 hour(s))   CBC and Auto Differential   Result Value Ref Range    WBC 7.0 4.4 - 11.3 x10*3/uL    nRBC 0.0 0.0 - 0.0 /100 WBCs    RBC 3.69 (L) 4.50 - 5.90 x10*6/uL    Hemoglobin 11.2 (L) 13.5 - 17.5 g/dL    Hematocrit 33.8 (L) 41.0 - 52.0 %    MCV 92 80 - 100 fL    MCH 30.4 26.0 - 34.0 pg    MCHC 33.1 32.0 - 36.0 g/dL    RDW 15.1 (H) 11.5 - 14.5 %    Platelets 172 150 - 450 x10*3/uL    Neutrophils % 56.4 40.0 - 80.0 %    Immature Granulocytes %, Automated 0.3 0.0 - 0.9 %    Lymphocytes % 30.4 13.0 - 44.0 %    Monocytes % 7.7 2.0 - 10.0 %    Eosinophils % 4.2 0.0 - 6.0 %    Basophils % 1.0 0.0 - 2.0 %    Neutrophils Absolute 3.94 1.20 - 7.70 x10*3/uL     Immature Granulocytes Absolute, Automated 0.02 0.00 - 0.70 x10*3/uL    Lymphocytes Absolute 2.12 1.20 - 4.80 x10*3/uL    Monocytes Absolute 0.54 0.10 - 1.00 x10*3/uL    Eosinophils Absolute 0.29 0.00 - 0.70 x10*3/uL    Basophils Absolute 0.07 0.00 - 0.10 x10*3/uL   Comprehensive Metabolic Panel   Result Value Ref Range    Glucose 103 (H) 65 - 99 mg/dL    Sodium 142 133 - 145 mmol/L    Potassium 3.6 3.4 - 5.1 mmol/L    Chloride 107 97 - 107 mmol/L    Bicarbonate 26 24 - 31 mmol/L    Urea Nitrogen 5 (L) 8 - 25 mg/dL    Creatinine 0.70 0.40 - 1.60 mg/dL    eGFR >90 >60 mL/min/1.73m*2    Calcium 7.7 (L) 8.5 - 10.4 mg/dL    Albumin 3.1 (L) 3.5 - 5.0 g/dL    Alkaline Phosphatase 77 35 - 125 U/L    Total Protein 5.6 (L) 5.9 - 7.9 g/dL    AST 52 (H) 5 - 40 U/L    Bilirubin, Total 0.3 0.1 - 1.2 mg/dL    ALT 85 (H) 5 - 40 U/L    Anion Gap 9 <=19 mmol/L   Magnesium   Result Value Ref Range    Magnesium 1.60 1.60 - 3.10 mg/dL   Phosphorus   Result Value Ref Range    Phosphorus 2.6 2.5 - 4.5 mg/dL   Creatine Kinase   Result Value Ref Range    Creatine Kinase 1,233 (H) 24 - 195 U/L            Assessment/Plan   Profound hypokalemia is resolved, continue oral potassium 40 millequivalents twice a day and will need a renal function panel and CK level 3 days after discharge and follow-up with us in the office in 2 weeks.  From a renal standpoint no barriers to discharge today if cleared otherwise  Hypertension   Acute rhabdomyolysis CPK is much improved.  Stop IV fluids  Hypophosphatemia  it is resolved  Hypomagnesemia resolved   hypocalcemia is improving    Shailesh Garcia MD

## 2024-09-16 NOTE — CARE PLAN
Overall patient has improved clinically. No leukocytosis, LFTs have improved. Denies any abdominal pain, nausea, vomiting. He is having diarrhea which is multifactorial possibly antibiotic related vs electrolyte imbalance with rhabdomyolysis. Stool studies negative. Imodium has been ordered. We plan for a close outpatient follow up for Colonoscopy. OK to DC from GI standpoint when cleared by other consults. GI will sign off

## 2024-09-16 NOTE — PROGRESS NOTES
09/16/24 1312   Discharge Planning   Home or Post Acute Services None   Expected Discharge Disposition Home   Does the patient need discharge transport arranged? No     Home no needs when medically cleared.

## 2024-09-17 ENCOUNTER — PATIENT MESSAGE (OUTPATIENT)
Dept: PRIMARY CARE | Facility: CLINIC | Age: 49
End: 2024-09-17

## 2024-09-17 ENCOUNTER — TELEPHONE (OUTPATIENT)
Dept: PRIMARY CARE | Facility: CLINIC | Age: 49
End: 2024-09-17

## 2024-09-17 ENCOUNTER — TELEPHONE (OUTPATIENT)
Dept: INPATIENT UNIT | Facility: HOSPITAL | Age: 49
End: 2024-09-17

## 2024-09-18 LAB — ALDOST SERPL-MCNC: 3.2 NG/DL

## 2024-09-20 ENCOUNTER — LAB (OUTPATIENT)
Dept: LAB | Facility: LAB | Age: 49
End: 2024-09-20

## 2024-09-20 ENCOUNTER — OFFICE VISIT (OUTPATIENT)
Dept: PRIMARY CARE | Facility: CLINIC | Age: 49
End: 2024-09-20

## 2024-09-20 VITALS
HEART RATE: 88 BPM | SYSTOLIC BLOOD PRESSURE: 122 MMHG | RESPIRATION RATE: 16 BRPM | OXYGEN SATURATION: 98 % | DIASTOLIC BLOOD PRESSURE: 78 MMHG

## 2024-09-20 DIAGNOSIS — K64.1 GRADE II HEMORRHOIDS: ICD-10-CM

## 2024-09-20 DIAGNOSIS — Z79.899 DRUG THERAPY: ICD-10-CM

## 2024-09-20 DIAGNOSIS — E87.6 HYPOKALEMIA: Primary | ICD-10-CM

## 2024-09-20 DIAGNOSIS — I10 PRIMARY HYPERTENSION: ICD-10-CM

## 2024-09-20 DIAGNOSIS — E87.6 HYPOKALEMIA: ICD-10-CM

## 2024-09-20 DIAGNOSIS — E87.5 HYPERKALEMIA: Primary | ICD-10-CM

## 2024-09-20 PROBLEM — K64.9 HEMORRHOID: Status: ACTIVE | Noted: 2024-09-20

## 2024-09-20 LAB
ALBUMIN SERPL BCP-MCNC: 4.5 G/DL (ref 3.4–5)
ALP SERPL-CCNC: 87 U/L (ref 33–120)
ALT SERPL W P-5'-P-CCNC: 52 U/L (ref 10–52)
ANION GAP SERPL CALC-SCNC: 15 MMOL/L (ref 10–20)
AST SERPL W P-5'-P-CCNC: 21 U/L (ref 9–39)
BILIRUB SERPL-MCNC: 0.5 MG/DL (ref 0–1.2)
BUN SERPL-MCNC: 15 MG/DL (ref 6–23)
CALCIUM SERPL-MCNC: 10.8 MG/DL (ref 8.6–10.3)
CHLORIDE SERPL-SCNC: 97 MMOL/L (ref 98–107)
CO2 SERPL-SCNC: 28 MMOL/L (ref 21–32)
CREAT SERPL-MCNC: 1.38 MG/DL (ref 0.5–1.3)
EGFRCR SERPLBLD CKD-EPI 2021: 63 ML/MIN/1.73M*2
GLUCOSE SERPL-MCNC: 104 MG/DL (ref 74–99)
HMGCR IGG SER IA-ACNC: 7 UNITS (ref 0–19)
MAGNESIUM SERPL-MCNC: 1.98 MG/DL (ref 1.6–2.4)
POTASSIUM SERPL-SCNC: 6.3 MMOL/L (ref 3.5–5.3)
PROT SERPL-MCNC: 7 G/DL (ref 6.4–8.2)
RENIN PLAS-CCNC: 0.1 NG/ML/HR
SODIUM SERPL-SCNC: 134 MMOL/L (ref 136–145)

## 2024-09-20 PROCEDURE — 83735 ASSAY OF MAGNESIUM: CPT

## 2024-09-20 PROCEDURE — 36415 COLL VENOUS BLD VENIPUNCTURE: CPT

## 2024-09-20 PROCEDURE — 3074F SYST BP LT 130 MM HG: CPT | Performed by: FAMILY MEDICINE

## 2024-09-20 PROCEDURE — 99212 OFFICE O/P EST SF 10 MIN: CPT | Performed by: FAMILY MEDICINE

## 2024-09-20 PROCEDURE — 3078F DIAST BP <80 MM HG: CPT | Performed by: FAMILY MEDICINE

## 2024-09-20 PROCEDURE — 80053 COMPREHEN METABOLIC PANEL: CPT

## 2024-09-20 RX ORDER — AMLODIPINE BESYLATE 10 MG/1
5 TABLET ORAL DAILY
Start: 2024-09-20

## 2024-09-20 RX ORDER — HYDROCORTISONE 25 MG/G
CREAM TOPICAL 4 TIMES DAILY PRN
Qty: 30 G | Refills: 0 | Status: SHIPPED | OUTPATIENT
Start: 2024-09-20 | End: 2025-09-20

## 2024-09-20 NOTE — PATIENT INSTRUCTIONS
Keep taking po fluids.  Hold lisinopril and potassium supplements until we get results of bloodwork.

## 2024-09-20 NOTE — ASSESSMENT & PLAN NOTE
Too Well controlled on lisinopril 10 and norvasc 10    Cut back on norvasc 5 mg to prevent orthostasis.   Stop lisinopril 10 mg due to hyperkalemia.

## 2024-09-20 NOTE — PROGRESS NOTES
"Subjective   Patient ID: Bereket Schaefer is a 48 y.o. male who presents for Hospital Follow-up.    Here for hospital follow up after in [facility] with severe hypokalemia and   9/10-9/16/24 Admit [facility] hypokalemia -- Kcl 40 bid, stopped ibuprofen, muscle relaxer and statin.     Elevated CK but NO elevation in Troponin.     9/19/24 K 6.5, CK 92 at [facility]    Objective   Visit Vitals  /78 (BP Location: Left arm, Patient Position: Sitting, BP Cuff Size: Adult)   Pulse 88   Resp 16   SpO2 98%   Smoking Status Every Day      O: VSS AFEB Awake, Alert, NAD.  No intoxication, withdrawal, or sedation.  Chest CTA.  Heart RRR.  Ext no c/c/e.      9/20/24 EKG with NSR no ST elevation or depression    Lab Results   Component Value Date    WBC 7.0 09/16/2024    HGB 11.2 (L) 09/16/2024    HCT 33.8 (L) 09/16/2024    MCV 92 09/16/2024     09/16/2024       Chemistry    Lab Results   Component Value Date/Time     09/16/2024 0500    K 3.6 09/16/2024 0500     09/16/2024 0500    CO2 26 09/16/2024 0500    BUN 5 (L) 09/16/2024 0500    CREATININE 0.70 09/16/2024 0500    Lab Results   Component Value Date/Time    CALCIUM 7.7 (L) 09/16/2024 0500    ALKPHOS 77 09/16/2024 0500    AST 52 (H) 09/16/2024 0500    ALT 85 (H) 09/16/2024 0500    BILITOT 0.3 09/16/2024 0500          Lab Results   Component Value Date    CHOL 229 (H) 11/08/2018     Lab Results   Component Value Date    HDL 27.8 (A) 11/08/2018     No results found for: \"LDLCALC\"  Lab Results   Component Value Date    TRIG 184 (H) 11/08/2018     No components found for: \"CHOLHDL\"   Lab Results   Component Value Date    HGBA1C 5.6 09/10/2024       Assessment/Plan   Problem List Items Addressed This Visit       Hypertension    Overview      Renal us/bloodwork ok. 24 hour metanephrines normal.   Hold off on b-blockers due to pulse in the 60s.   EKG was ok. encourage smoking cessation, sodium restriction.     3/8/16 ENDO ([facility])  NO ADRENAL CAUSE of HYPERTENSION. MP    # High " Cholesterol-- 13.3% cardiac risk. counseled on smoking cessation -- atorvastatin 20 mg daily.     # hx Atypical chest pain -- stress test and echo normal 11/2018.         Current Assessment & Plan     Too Well controlled on lisinopril 10 and norvasc 10    Cut back on norvasc 5 mg to prevent orthostasis.   Stop lisinopril 10 mg due to hyperkalemia.          Relevant Medications    amLODIPine (Norvasc) 10 mg tablet    Other Relevant Orders    Comprehensive Metabolic Panel    Hypokalemia - Primary    Current Assessment & Plan     up to 6.5 yesterday at [facility]. No peaked t-waves on ekg. Check labs today.     Keep taking po fluids.  Hold lisinopril and potassium supplements until we get results of bloodwork.          Relevant Orders    Comprehensive Metabolic Panel    Hemorrhoid    Current Assessment & Plan     Suspect pain from rectum triggering vasovagal spells.     Treat with 2.5% cream.           Relevant Medications    hydrocortisone (Anusol-HC) 2.5 % rectal cream     Other Visit Diagnoses       Drug therapy        Relevant Orders    Magnesium

## 2024-09-20 NOTE — ASSESSMENT & PLAN NOTE
up to 6.5 yesterday at [facility]. No peaked t-waves on ekg. Check labs today.     Keep taking po fluids.  Hold lisinopril and potassium supplements until we get results of bloodwork.

## 2024-09-21 ENCOUNTER — LAB (OUTPATIENT)
Dept: LAB | Facility: LAB | Age: 49
End: 2024-09-21

## 2024-09-21 DIAGNOSIS — E87.5 HYPERKALEMIA: ICD-10-CM

## 2024-09-21 LAB
ANION GAP SERPL CALC-SCNC: 17 MMOL/L (ref 10–20)
BUN SERPL-MCNC: 15 MG/DL (ref 6–23)
CALCIUM SERPL-MCNC: 10.7 MG/DL (ref 8.6–10.3)
CHLORIDE SERPL-SCNC: 96 MMOL/L (ref 98–107)
CO2 SERPL-SCNC: 27 MMOL/L (ref 21–32)
CREAT SERPL-MCNC: 1.2 MG/DL (ref 0.5–1.3)
EGFRCR SERPLBLD CKD-EPI 2021: 74 ML/MIN/1.73M*2
GLUCOSE SERPL-MCNC: 90 MG/DL (ref 74–99)
POTASSIUM SERPL-SCNC: 5.7 MMOL/L (ref 3.5–5.3)
SODIUM SERPL-SCNC: 134 MMOL/L (ref 136–145)

## 2024-09-21 PROCEDURE — 80048 BASIC METABOLIC PNL TOTAL CA: CPT

## 2024-09-21 PROCEDURE — 36415 COLL VENOUS BLD VENIPUNCTURE: CPT

## 2024-09-21 NOTE — PROGRESS NOTES
Received critical results from answering service that patient's potassium is 6.3. Upon chart review it was reported that his potassium levels were 6.5 yesterday at [facility]. Spoke with patient directly and he denied any chest pain/ pressure, palpitations, muscle weakness/ numbness. He does report one episode of vomiting his evening but feels that it was related to the macaroni and cheese he ate for dinner. Patient confirmed that he is holding his lisinopril and potassium supplements. He stated that he has been able to increase his fluid intake as well. Patient agreeable for repeat blood work tomorrow morning and understands that if he develops any of the above symptoms or vomits again he should report the the ER immediately for evaluation and treatment purposes.

## 2024-09-23 ENCOUNTER — LAB (OUTPATIENT)
Dept: LAB | Facility: LAB | Age: 49
End: 2024-09-23

## 2024-09-23 DIAGNOSIS — E87.6 HYPOKALEMIA: ICD-10-CM

## 2024-09-23 LAB
ANION GAP SERPL CALC-SCNC: 18 MMOL/L (ref 10–20)
BUN SERPL-MCNC: 12 MG/DL (ref 6–23)
CALCIUM SERPL-MCNC: 9.4 MG/DL (ref 8.6–10.3)
CHLORIDE SERPL-SCNC: 96 MMOL/L (ref 98–107)
CO2 SERPL-SCNC: 27 MMOL/L (ref 21–32)
CREAT SERPL-MCNC: 1.01 MG/DL (ref 0.5–1.3)
EGFRCR SERPLBLD CKD-EPI 2021: >90 ML/MIN/1.73M*2
GLUCOSE SERPL-MCNC: 89 MG/DL (ref 74–99)
POTASSIUM SERPL-SCNC: 4.6 MMOL/L (ref 3.5–5.3)
SODIUM SERPL-SCNC: 136 MMOL/L (ref 136–145)

## 2024-09-23 PROCEDURE — 36415 COLL VENOUS BLD VENIPUNCTURE: CPT

## 2024-09-23 PROCEDURE — 80048 BASIC METABOLIC PNL TOTAL CA: CPT

## 2024-09-25 DIAGNOSIS — E87.6 HYPOKALEMIA: Primary | ICD-10-CM

## 2024-11-13 ENCOUNTER — APPOINTMENT (OUTPATIENT)
Dept: PRIMARY CARE | Facility: CLINIC | Age: 49
End: 2024-11-13

## 2024-11-13 DIAGNOSIS — G89.29 CHRONIC MIDLINE LOW BACK PAIN WITH BILATERAL SCIATICA: ICD-10-CM

## 2024-11-13 DIAGNOSIS — M54.42 CHRONIC MIDLINE LOW BACK PAIN WITH BILATERAL SCIATICA: ICD-10-CM

## 2024-11-13 DIAGNOSIS — M54.41 CHRONIC MIDLINE LOW BACK PAIN WITH BILATERAL SCIATICA: ICD-10-CM

## 2024-11-13 DIAGNOSIS — G89.29 CHRONIC MIDLINE LOW BACK PAIN WITH RIGHT-SIDED SCIATICA: ICD-10-CM

## 2024-11-13 DIAGNOSIS — M54.41 CHRONIC MIDLINE LOW BACK PAIN WITH RIGHT-SIDED SCIATICA: ICD-10-CM

## 2024-11-13 DIAGNOSIS — I10 PRIMARY HYPERTENSION: Primary | ICD-10-CM

## 2024-11-13 DIAGNOSIS — E87.6 HYPOKALEMIA: ICD-10-CM

## 2024-11-13 PROCEDURE — 99212 OFFICE O/P EST SF 10 MIN: CPT | Performed by: FAMILY MEDICINE

## 2024-11-13 RX ORDER — HYDROCODONE BITARTRATE AND ACETAMINOPHEN 10; 325 MG/1; MG/1
1 TABLET ORAL EVERY 4 HOURS PRN
Qty: 180 TABLET | Refills: 0 | Status: SHIPPED | OUTPATIENT
Start: 2024-12-16 | End: 2025-01-15

## 2024-11-13 RX ORDER — HYDROCODONE BITARTRATE AND ACETAMINOPHEN 10; 325 MG/1; MG/1
1 TABLET ORAL EVERY 4 HOURS PRN
Qty: 180 TABLET | Refills: 0 | Status: SHIPPED | OUTPATIENT
Start: 2024-11-16 | End: 2024-12-16

## 2024-11-13 RX ORDER — HYDROCODONE BITARTRATE AND ACETAMINOPHEN 10; 325 MG/1; MG/1
1 TABLET ORAL EVERY 4 HOURS PRN
Qty: 180 TABLET | Refills: 0 | Status: SHIPPED | OUTPATIENT
Start: 2025-01-15 | End: 2025-02-14

## 2024-11-13 NOTE — ASSESSMENT & PLAN NOTE
Trialled off NORCO as of 2/2024-7/2024 -- mild withdrawal sx improved with clonidine.  No cravings or aberrant behaviour x 5 months while off but with worsening pain and loss of function at home and work due to pain limiting his function.     Tox screen 6/19/2020 urine dip 6/11/2021, 5/25/2022, 8/24/2022, 5/31/23, urine dip 7/19/24  [place] reviewed  7/19/24 (last fill norco 1/2024), 8/14/24.   Opioid agreement signed 5/31/23, 7/19/24    Tolerated slow wean off pain meds.  Off clonidine.    No sign of active addiction -- just dependence from chronic use.   Did not tolerate cymbalta.  Sticking with lexapro.    Failed Trial of buprenorphine 2 mg sl dialy #30 for pain -- caused sedation and minimal improvement in pain or function.     Continue previousstrategy of norco 10/325 1 po q4h prn pain.  Max 6/day or 60 MED.
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
## 2025-02-12 ENCOUNTER — APPOINTMENT (OUTPATIENT)
Dept: PRIMARY CARE | Facility: CLINIC | Age: 50
End: 2025-02-12

## 2025-02-12 DIAGNOSIS — M54.42 CHRONIC MIDLINE LOW BACK PAIN WITH BILATERAL SCIATICA: ICD-10-CM

## 2025-02-12 DIAGNOSIS — G89.29 CHRONIC MIDLINE LOW BACK PAIN WITH RIGHT-SIDED SCIATICA: ICD-10-CM

## 2025-02-12 DIAGNOSIS — G89.29 CHRONIC MIDLINE LOW BACK PAIN WITH BILATERAL SCIATICA: ICD-10-CM

## 2025-02-12 DIAGNOSIS — M25.569 CHRONIC KNEE PAIN, UNSPECIFIED LATERALITY: ICD-10-CM

## 2025-02-12 DIAGNOSIS — G89.29 CHRONIC KNEE PAIN, UNSPECIFIED LATERALITY: ICD-10-CM

## 2025-02-12 DIAGNOSIS — I10 PRIMARY HYPERTENSION: Primary | ICD-10-CM

## 2025-02-12 DIAGNOSIS — M54.41 CHRONIC MIDLINE LOW BACK PAIN WITH BILATERAL SCIATICA: ICD-10-CM

## 2025-02-12 DIAGNOSIS — M54.41 CHRONIC MIDLINE LOW BACK PAIN WITH RIGHT-SIDED SCIATICA: ICD-10-CM

## 2025-02-12 PROCEDURE — 99212 OFFICE O/P EST SF 10 MIN: CPT | Performed by: FAMILY MEDICINE

## 2025-02-12 RX ORDER — HYDROCODONE BITARTRATE AND ACETAMINOPHEN 10; 325 MG/1; MG/1
1 TABLET ORAL EVERY 4 HOURS PRN
Qty: 180 TABLET | Refills: 0 | Status: SHIPPED | OUTPATIENT
Start: 2025-04-15 | End: 2025-05-15

## 2025-02-12 RX ORDER — HYDROCODONE BITARTRATE AND ACETAMINOPHEN 10; 325 MG/1; MG/1
1 TABLET ORAL EVERY 4 HOURS PRN
Qty: 180 TABLET | Refills: 0 | Status: SHIPPED | OUTPATIENT
Start: 2025-02-14 | End: 2025-03-16

## 2025-02-12 RX ORDER — HYDROCODONE BITARTRATE AND ACETAMINOPHEN 10; 325 MG/1; MG/1
1 TABLET ORAL EVERY 4 HOURS PRN
Qty: 180 TABLET | Refills: 0 | Status: SHIPPED | OUTPATIENT
Start: 2025-03-16 | End: 2025-04-15

## 2025-02-12 NOTE — ASSESSMENT & PLAN NOTE
Trialled off NORCO as of 2/2024-7/2024 -- mild withdrawal sx improved with clonidine.  No cravings or aberrant behaviour x 5 months while off but with worsening pain and loss of function at home and work due to pain limiting his function.     Tox screen 6/19/2020 urine dip 6/11/2021, 5/25/2022, 8/24/2022, 5/31/23, urine dip 7/19/24, tox appropriate 9/11/24.  OARRS reviewed  7/19/24 (last fill norco 1/2024), 8/14/24.   Opioid agreement signed 5/31/23, 7/19/24    Tolerated slow wean off pain meds.  Off clonidine.    No sign of active addiction -- just dependence from chronic use.   Did not tolerate cymbalta.  Sticking with lexapro.    Failed Trial of buprenorphine 2 mg sl dialy #30 for pain -- caused sedation and minimal improvement in pain or function.     Continue previousstrategy of norco 10/325 1 po q4h prn pain.  Max 6/day or 60 MED.
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
## 2025-03-12 DIAGNOSIS — N52.9 MALE ERECTILE DISORDER OF ORGANIC ORIGIN: ICD-10-CM

## 2025-03-12 RX ORDER — TADALAFIL 5 MG/1
5 TABLET ORAL DAILY
Qty: 30 TABLET | Refills: 3 | Status: SHIPPED | OUTPATIENT
Start: 2025-03-12

## 2025-05-14 ENCOUNTER — APPOINTMENT (OUTPATIENT)
Dept: PRIMARY CARE | Facility: CLINIC | Age: 50
End: 2025-05-14

## 2025-05-14 VITALS
SYSTOLIC BLOOD PRESSURE: 136 MMHG | HEART RATE: 82 BPM | WEIGHT: 181.4 LBS | BODY MASS INDEX: 24.57 KG/M2 | HEIGHT: 72 IN | TEMPERATURE: 98.2 F | DIASTOLIC BLOOD PRESSURE: 80 MMHG | OXYGEN SATURATION: 97 %

## 2025-05-14 DIAGNOSIS — M54.41 CHRONIC MIDLINE LOW BACK PAIN WITH RIGHT-SIDED SCIATICA: Primary | ICD-10-CM

## 2025-05-14 DIAGNOSIS — G89.29 CHRONIC MIDLINE LOW BACK PAIN WITH BILATERAL SCIATICA: ICD-10-CM

## 2025-05-14 DIAGNOSIS — I10 PRIMARY HYPERTENSION: ICD-10-CM

## 2025-05-14 DIAGNOSIS — M54.42 CHRONIC MIDLINE LOW BACK PAIN WITH BILATERAL SCIATICA: ICD-10-CM

## 2025-05-14 DIAGNOSIS — G89.29 CHRONIC MIDLINE LOW BACK PAIN WITH RIGHT-SIDED SCIATICA: Primary | ICD-10-CM

## 2025-05-14 DIAGNOSIS — M54.41 CHRONIC MIDLINE LOW BACK PAIN WITH BILATERAL SCIATICA: ICD-10-CM

## 2025-05-14 DIAGNOSIS — E87.6 HYPOKALEMIA: ICD-10-CM

## 2025-05-14 PROCEDURE — 3075F SYST BP GE 130 - 139MM HG: CPT | Performed by: FAMILY MEDICINE

## 2025-05-14 PROCEDURE — 3079F DIAST BP 80-89 MM HG: CPT | Performed by: FAMILY MEDICINE

## 2025-05-14 PROCEDURE — 99212 OFFICE O/P EST SF 10 MIN: CPT | Performed by: FAMILY MEDICINE

## 2025-05-14 PROCEDURE — 3008F BODY MASS INDEX DOCD: CPT | Performed by: FAMILY MEDICINE

## 2025-05-14 RX ORDER — HYDROCODONE BITARTRATE AND ACETAMINOPHEN 10; 325 MG/1; MG/1
1 TABLET ORAL EVERY 4 HOURS PRN
Qty: 180 TABLET | Refills: 0 | Status: SHIPPED | OUTPATIENT
Start: 2025-05-14 | End: 2025-06-13

## 2025-05-14 RX ORDER — HYDROCODONE BITARTRATE AND ACETAMINOPHEN 10; 325 MG/1; MG/1
1 TABLET ORAL EVERY 4 HOURS PRN
Qty: 180 TABLET | Refills: 0 | Status: SHIPPED | OUTPATIENT
Start: 2025-06-13 | End: 2025-07-13

## 2025-05-14 RX ORDER — HYDROCODONE BITARTRATE AND ACETAMINOPHEN 10; 325 MG/1; MG/1
1 TABLET ORAL EVERY 4 HOURS PRN
Qty: 180 TABLET | Refills: 0 | Status: SHIPPED | OUTPATIENT
Start: 2025-07-13 | End: 2025-08-12

## 2025-05-14 ASSESSMENT — PAIN SCALES - GENERAL: PAINLEVEL_OUTOF10: 5

## 2025-05-14 NOTE — ASSESSMENT & PLAN NOTE
Trialled off NORCO as of 2/2024-7/2024 -- mild withdrawal sx improved with clonidine.  No cravings or aberrant behaviour x 5 months while off but with worsening pain and loss of function at home and work due to pain limiting his function.     Tox screen 6/19/2020 urine dip 6/11/2021, 5/25/2022, 8/24/2022, 5/31/23, urine dip 7/19/24, tox appropriate 9/11/24.  OARRS reviewed  7/19/24 (last fill norco 1/2024), 8/14/24.   Opioid agreement signed 5/31/23, 7/19/24    Tolerated slow wean off pain meds.  Off clonidine.    No sign of active addiction -- just dependence from chronic use.   Did not tolerate cymbalta.  Sticking with lexapro.    Failed Trial of buprenorphine 2 mg sl dialy #30 for pain -- caused sedation and minimal improvement in pain or function.     Continue previousstrategy of norco 10/325 1 po q4h prn pain.  Max 6/day or 60 MED. Trialled off NORCO as of 2/2024-7/2024 -- mild withdrawal sx improved with clonidine.  No cravings or aberrant behaviour x 5 months while off but with worsening pain and loss of function at home and work due to pain limiting his function.     Tox screen 6/19/2020 urine dip 6/11/2021, 5/25/2022, 8/24/2022, 5/31/23, urine dip 7/19/24, tox appropriate 9/11/24, retest 5/14/25  OARRS reviewed  7/19/24 (last fill norco 1/2024), 8/14/24, 5/14/25  Opioid agreement signed 5/31/23, 7/19/24, reviewed 5/14/25    Tolerated slow wean off pain meds.  Off clonidine.    No sign of active addiction -- just dependence from chronic use.   Did not tolerate cymbalta.  Sticking with lexapro.    Failed Trial of buprenorphine 2 mg sl dialy #30 for pain -- caused sedation and minimal improvement in pain or function.     Continue previousstrategy of norco 10/325 1 po q4h prn pain.  Max 6/day or 60 MED.
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
## 2025-07-23 ENCOUNTER — TELEPHONE (OUTPATIENT)
Dept: PRIMARY CARE | Facility: CLINIC | Age: 50
End: 2025-07-23

## 2025-07-25 DIAGNOSIS — G89.29 CHRONIC MIDLINE LOW BACK PAIN WITH RIGHT-SIDED SCIATICA: ICD-10-CM

## 2025-07-25 DIAGNOSIS — M54.41 CHRONIC MIDLINE LOW BACK PAIN WITH RIGHT-SIDED SCIATICA: ICD-10-CM

## 2025-07-25 RX ORDER — FLUCONAZOLE 100 MG/1
100 TABLET ORAL DAILY
Qty: 7 TABLET | Refills: 1 | Status: SHIPPED | OUTPATIENT
Start: 2025-07-25 | End: 2025-08-08

## 2025-08-11 DIAGNOSIS — M54.42 CHRONIC MIDLINE LOW BACK PAIN WITH BILATERAL SCIATICA: ICD-10-CM

## 2025-08-11 DIAGNOSIS — G89.29 CHRONIC MIDLINE LOW BACK PAIN WITH BILATERAL SCIATICA: ICD-10-CM

## 2025-08-11 DIAGNOSIS — M54.41 CHRONIC MIDLINE LOW BACK PAIN WITH BILATERAL SCIATICA: ICD-10-CM

## 2025-08-12 RX ORDER — HYDROCODONE BITARTRATE AND ACETAMINOPHEN 10; 325 MG/1; MG/1
1 TABLET ORAL EVERY 4 HOURS PRN
Qty: 180 TABLET | Refills: 0 | Status: SHIPPED | OUTPATIENT
Start: 2025-08-12 | End: 2025-08-13 | Stop reason: SDUPTHER

## 2025-08-13 ENCOUNTER — APPOINTMENT (OUTPATIENT)
Dept: PRIMARY CARE | Facility: CLINIC | Age: 50
End: 2025-08-13

## 2025-08-13 DIAGNOSIS — M54.41 CHRONIC MIDLINE LOW BACK PAIN WITH BILATERAL SCIATICA: ICD-10-CM

## 2025-08-13 DIAGNOSIS — M54.42 CHRONIC MIDLINE LOW BACK PAIN WITH BILATERAL SCIATICA: ICD-10-CM

## 2025-08-13 DIAGNOSIS — G89.29 CHRONIC MIDLINE LOW BACK PAIN WITH RIGHT-SIDED SCIATICA: ICD-10-CM

## 2025-08-13 DIAGNOSIS — I10 PRIMARY HYPERTENSION: Primary | ICD-10-CM

## 2025-08-13 DIAGNOSIS — M54.41 CHRONIC MIDLINE LOW BACK PAIN WITH RIGHT-SIDED SCIATICA: ICD-10-CM

## 2025-08-13 DIAGNOSIS — G89.29 CHRONIC MIDLINE LOW BACK PAIN WITH BILATERAL SCIATICA: ICD-10-CM

## 2025-08-13 PROCEDURE — 99212 OFFICE O/P EST SF 10 MIN: CPT | Performed by: FAMILY MEDICINE

## 2025-08-13 RX ORDER — HYDROCODONE BITARTRATE AND ACETAMINOPHEN 10; 325 MG/1; MG/1
1 TABLET ORAL EVERY 4 HOURS PRN
Qty: 180 TABLET | Refills: 0 | Status: SHIPPED | OUTPATIENT
Start: 2025-11-10 | End: 2025-12-10

## 2025-08-13 RX ORDER — HYDROCODONE BITARTRATE AND ACETAMINOPHEN 10; 325 MG/1; MG/1
1 TABLET ORAL EVERY 4 HOURS PRN
Qty: 180 TABLET | Refills: 0 | Status: SHIPPED | OUTPATIENT
Start: 2025-10-11 | End: 2025-11-10

## 2025-08-13 RX ORDER — HYDROCODONE BITARTRATE AND ACETAMINOPHEN 10; 325 MG/1; MG/1
1 TABLET ORAL EVERY 4 HOURS PRN
Qty: 180 TABLET | Refills: 0 | Status: SHIPPED | OUTPATIENT
Start: 2025-09-11 | End: 2025-10-11

## 2025-10-08 ENCOUNTER — APPOINTMENT (OUTPATIENT)
Dept: PRIMARY CARE | Facility: CLINIC | Age: 50
End: 2025-10-08

## 2025-11-18 ENCOUNTER — APPOINTMENT (OUTPATIENT)
Dept: PRIMARY CARE | Facility: CLINIC | Age: 50
End: 2025-11-18

## 2025-11-19 ENCOUNTER — APPOINTMENT (OUTPATIENT)
Dept: PRIMARY CARE | Facility: CLINIC | Age: 50
End: 2025-11-19

## 2025-12-16 ENCOUNTER — APPOINTMENT (OUTPATIENT)
Dept: PRIMARY CARE | Facility: CLINIC | Age: 50
End: 2025-12-16

## 2026-01-28 ENCOUNTER — APPOINTMENT (OUTPATIENT)
Dept: PRIMARY CARE | Facility: CLINIC | Age: 51
End: 2026-01-28

## 2026-02-17 ENCOUNTER — APPOINTMENT (OUTPATIENT)
Dept: PRIMARY CARE | Facility: CLINIC | Age: 51
End: 2026-02-17

## 2026-04-22 ENCOUNTER — APPOINTMENT (OUTPATIENT)
Dept: PRIMARY CARE | Facility: CLINIC | Age: 51
End: 2026-04-22

## 2026-05-13 ENCOUNTER — APPOINTMENT (OUTPATIENT)
Dept: PRIMARY CARE | Facility: CLINIC | Age: 51
End: 2026-05-13

## 2026-07-15 ENCOUNTER — APPOINTMENT (OUTPATIENT)
Dept: PRIMARY CARE | Facility: CLINIC | Age: 51
End: 2026-07-15